# Patient Record
Sex: MALE | Race: OTHER | NOT HISPANIC OR LATINO | Employment: FULL TIME | URBAN - METROPOLITAN AREA
[De-identification: names, ages, dates, MRNs, and addresses within clinical notes are randomized per-mention and may not be internally consistent; named-entity substitution may affect disease eponyms.]

---

## 2023-01-27 PROBLEM — R60.9 EDEMA: Status: ACTIVE | Noted: 2023-01-27

## 2023-01-27 PROBLEM — R07.9 CHEST PAIN: Status: ACTIVE | Noted: 2023-01-27

## 2023-01-27 PROBLEM — M1A.9XX0 CHRONIC GOUT: Status: ACTIVE | Noted: 2023-01-27

## 2023-01-27 PROBLEM — E78.5 HYPERLIPIDEMIA: Status: ACTIVE | Noted: 2023-01-27

## 2023-01-27 PROBLEM — Z98.61 S/P PTCA (PERCUTANEOUS TRANSLUMINAL CORONARY ANGIOPLASTY): Status: ACTIVE | Noted: 2023-01-27

## 2023-01-27 PROBLEM — I10 BENIGN ESSENTIAL HYPERTENSION: Status: ACTIVE | Noted: 2023-01-27

## 2023-01-27 PROBLEM — G47.33 OBSTRUCTIVE SLEEP APNEA: Status: ACTIVE | Noted: 2023-01-27

## 2023-01-27 PROBLEM — R68.81 EARLY SATIETY: Status: ACTIVE | Noted: 2023-01-27

## 2023-01-27 PROBLEM — R63.4 ABNORMAL WEIGHT LOSS: Status: ACTIVE | Noted: 2023-01-27

## 2023-01-27 PROBLEM — I25.10 CAD IN NATIVE ARTERY: Status: ACTIVE | Noted: 2023-01-27

## 2023-01-27 PROBLEM — R06.02 SHORTNESS OF BREATH: Status: ACTIVE | Noted: 2023-01-27

## 2023-01-27 PROBLEM — I35.0 NONRHEUMATIC AORTIC VALVE STENOSIS: Status: ACTIVE | Noted: 2023-01-27

## 2023-01-27 PROBLEM — M75.82 TENDINITIS OF LEFT ROTATOR CUFF: Status: ACTIVE | Noted: 2023-01-27

## 2023-01-27 PROBLEM — R93.89 ABNORMAL CHEST CT: Status: ACTIVE | Noted: 2023-01-27

## 2023-01-27 PROBLEM — R42 DIZZINESS: Status: ACTIVE | Noted: 2023-01-27

## 2023-01-27 PROBLEM — K21.9 GERD (GASTROESOPHAGEAL REFLUX DISEASE): Status: ACTIVE | Noted: 2023-01-27

## 2023-01-27 PROBLEM — Z86.79 HISTORY OF ORTHOSTATIC HYPOTENSION: Status: ACTIVE | Noted: 2023-01-27

## 2023-01-27 PROBLEM — M75.52 BURSITIS OF LEFT SHOULDER: Status: ACTIVE | Noted: 2023-01-27

## 2023-01-27 PROBLEM — R01.1 MURMUR: Status: ACTIVE | Noted: 2023-01-27

## 2023-01-27 PROBLEM — Z98.84 STATUS POST BARIATRIC SURGERY: Status: ACTIVE | Noted: 2023-01-27

## 2023-01-27 PROBLEM — M75.42 IMPINGEMENT SYNDROME OF LEFT SHOULDER: Status: ACTIVE | Noted: 2023-01-27

## 2023-01-27 PROBLEM — R94.39 ABNORMAL STRESS TEST: Status: ACTIVE | Noted: 2023-01-27

## 2023-01-27 PROBLEM — E11.9 DIABETES MELLITUS (MULTI): Status: ACTIVE | Noted: 2023-01-27

## 2023-01-27 PROBLEM — F32.A DEPRESSION: Status: ACTIVE | Noted: 2023-01-27

## 2023-01-27 PROBLEM — N40.0 BPH (BENIGN PROSTATIC HYPERPLASIA): Status: ACTIVE | Noted: 2023-01-27

## 2023-01-27 PROBLEM — R00.2 PALPITATIONS: Status: ACTIVE | Noted: 2023-01-27

## 2023-01-27 PROBLEM — M25.512 LEFT SHOULDER PAIN: Status: ACTIVE | Noted: 2023-01-27

## 2023-01-27 RX ORDER — PREGABALIN 150 MG/1
1 CAPSULE ORAL EVERY MORNING
COMMUNITY
Start: 2022-10-12

## 2023-01-27 RX ORDER — NITROGLYCERIN 0.4 MG/1
0.4 TABLET SUBLINGUAL EVERY 5 MIN PRN
COMMUNITY
Start: 2018-06-06

## 2023-01-27 RX ORDER — MELATONIN 3 MG
CAPSULE ORAL
COMMUNITY
Start: 2022-10-12

## 2023-01-27 RX ORDER — OMEPRAZOLE 20 MG/1
1 CAPSULE, DELAYED RELEASE ORAL 2 TIMES DAILY
COMMUNITY
Start: 2022-10-12

## 2023-01-27 RX ORDER — AMLODIPINE BESYLATE 5 MG/1
1 TABLET ORAL DAILY
COMMUNITY
Start: 2022-10-12

## 2023-01-27 RX ORDER — METOPROLOL TARTRATE 25 MG/1
1 TABLET, FILM COATED ORAL 2 TIMES DAILY
COMMUNITY
Start: 2019-04-01

## 2023-01-27 RX ORDER — ASPIRIN 81 MG/1
81 TABLET ORAL
COMMUNITY

## 2023-01-27 RX ORDER — ALBUTEROL SULFATE 90 UG/1
1 AEROSOL, METERED RESPIRATORY (INHALATION) EVERY 4 HOURS PRN
COMMUNITY
Start: 2017-07-14

## 2023-01-27 RX ORDER — CHOLECALCIFEROL (VITAMIN D3) 50 MCG
1 TABLET ORAL DAILY
COMMUNITY
Start: 2022-10-12

## 2023-01-27 RX ORDER — DULOXETIN HYDROCHLORIDE 60 MG/1
60 CAPSULE, DELAYED RELEASE ORAL DAILY
COMMUNITY
Start: 2022-10-12

## 2023-01-27 RX ORDER — METFORMIN HYDROCHLORIDE 1000 MG/1
1 TABLET ORAL EVERY 12 HOURS
COMMUNITY
Start: 2016-07-14

## 2023-01-27 RX ORDER — NAPROXEN 375 MG/1
TABLET ORAL
COMMUNITY
Start: 2022-10-12 | End: 2023-10-27

## 2023-01-27 RX ORDER — BUPROPION HYDROCHLORIDE 150 MG/1
150 TABLET, EXTENDED RELEASE ORAL DAILY
COMMUNITY
Start: 2022-10-12

## 2023-01-27 RX ORDER — ATORVASTATIN CALCIUM 40 MG/1
40 TABLET, FILM COATED ORAL DAILY
COMMUNITY
Start: 2017-07-06

## 2023-01-27 RX ORDER — FERROUS SULFATE 325(65) MG
65 TABLET ORAL 3 TIMES DAILY
COMMUNITY
Start: 2022-10-12

## 2023-01-27 RX ORDER — TAMSULOSIN HYDROCHLORIDE 0.4 MG/1
1 CAPSULE ORAL DAILY
COMMUNITY
Start: 2016-07-14

## 2023-01-27 RX ORDER — ACETAMINOPHEN 325 MG/1
325 TABLET ORAL EVERY 6 HOURS PRN
COMMUNITY
Start: 2017-07-14

## 2023-01-27 RX ORDER — FLUTICASONE PROPIONATE 50 MCG
2 SPRAY, SUSPENSION (ML) NASAL NIGHTLY
COMMUNITY
Start: 2022-10-12

## 2023-01-27 RX ORDER — ACETAMINOPHEN, ASPIRIN (NSAID), AND CAFFEINE 250; 250; 65 MG/1; MG/1; MG/1
1 TABLET, FILM COATED ORAL 3 TIMES DAILY PRN
COMMUNITY
Start: 2017-07-14 | End: 2023-10-28 | Stop reason: ENTERED-IN-ERROR

## 2023-01-27 RX ORDER — ALLOPURINOL 300 MG/1
300 TABLET ORAL DAILY
COMMUNITY
Start: 2019-04-01

## 2023-01-27 RX ORDER — EZETIMIBE 10 MG/1
1 TABLET ORAL DAILY
COMMUNITY
Start: 2021-01-21

## 2023-01-27 RX ORDER — LISINOPRIL 20 MG/1
20 TABLET ORAL DAILY
COMMUNITY
Start: 2021-01-21

## 2023-01-27 RX ORDER — INSULIN GLARGINE 100 [IU]/ML
26 INJECTION, SOLUTION SUBCUTANEOUS 2 TIMES DAILY PRN
COMMUNITY

## 2023-06-06 ENCOUNTER — HOSPITAL ENCOUNTER (OUTPATIENT)
Dept: DATA CONVERSION | Facility: HOSPITAL | Age: 63
End: 2023-06-06
Attending: STUDENT IN AN ORGANIZED HEALTH CARE EDUCATION/TRAINING PROGRAM | Admitting: STUDENT IN AN ORGANIZED HEALTH CARE EDUCATION/TRAINING PROGRAM
Payer: COMMERCIAL

## 2023-06-06 DIAGNOSIS — Z95.5 PRESENCE OF CORONARY ANGIOPLASTY IMPLANT AND GRAFT: ICD-10-CM

## 2023-06-06 DIAGNOSIS — E78.5 HYPERLIPIDEMIA, UNSPECIFIED: ICD-10-CM

## 2023-06-06 DIAGNOSIS — G47.33 OBSTRUCTIVE SLEEP APNEA (ADULT) (PEDIATRIC): ICD-10-CM

## 2023-06-06 DIAGNOSIS — I25.10 ATHEROSCLEROTIC HEART DISEASE OF NATIVE CORONARY ARTERY WITHOUT ANGINA PECTORIS: ICD-10-CM

## 2023-06-06 DIAGNOSIS — S02.66XD: ICD-10-CM

## 2023-06-06 DIAGNOSIS — Z79.02 LONG TERM (CURRENT) USE OF ANTITHROMBOTICS/ANTIPLATELETS: ICD-10-CM

## 2023-06-06 DIAGNOSIS — E11.9 TYPE 2 DIABETES MELLITUS WITHOUT COMPLICATIONS (MULTI): ICD-10-CM

## 2023-06-06 DIAGNOSIS — I10 ESSENTIAL (PRIMARY) HYPERTENSION: ICD-10-CM

## 2023-06-06 DIAGNOSIS — S02.69XA: ICD-10-CM

## 2023-06-06 DIAGNOSIS — N40.0 BENIGN PROSTATIC HYPERPLASIA WITHOUT LOWER URINARY TRACT SYMPTOMS: ICD-10-CM

## 2023-06-06 DIAGNOSIS — Z87.891 PERSONAL HISTORY OF NICOTINE DEPENDENCE: ICD-10-CM

## 2023-06-06 DIAGNOSIS — Z79.82 LONG TERM (CURRENT) USE OF ASPIRIN: ICD-10-CM

## 2023-06-06 DIAGNOSIS — Z98.84 BARIATRIC SURGERY STATUS: ICD-10-CM

## 2023-06-06 LAB
POCT GLUCOSE: 108 MG/DL (ref 74–99)
POCT GLUCOSE: 120 MG/DL (ref 74–99)

## 2023-09-07 VITALS — HEIGHT: 70 IN | WEIGHT: 154.54 LBS | BODY MASS INDEX: 22.12 KG/M2

## 2023-09-18 ENCOUNTER — HOSPITAL ENCOUNTER (OUTPATIENT)
Dept: DATA CONVERSION | Facility: HOSPITAL | Age: 63
End: 2023-09-18
Attending: INTERNAL MEDICINE | Admitting: INTERNAL MEDICINE
Payer: COMMERCIAL

## 2023-09-18 DIAGNOSIS — I25.10 ATHEROSCLEROTIC HEART DISEASE OF NATIVE CORONARY ARTERY WITHOUT ANGINA PECTORIS: ICD-10-CM

## 2023-09-18 DIAGNOSIS — E11.9 TYPE 2 DIABETES MELLITUS WITHOUT COMPLICATIONS (MULTI): ICD-10-CM

## 2023-09-18 DIAGNOSIS — E78.5 HYPERLIPIDEMIA, UNSPECIFIED: ICD-10-CM

## 2023-09-18 DIAGNOSIS — Z79.01 LONG TERM (CURRENT) USE OF ANTICOAGULANTS: ICD-10-CM

## 2023-09-18 DIAGNOSIS — I10 ESSENTIAL (PRIMARY) HYPERTENSION: ICD-10-CM

## 2023-09-18 DIAGNOSIS — Z98.84 BARIATRIC SURGERY STATUS: ICD-10-CM

## 2023-09-18 DIAGNOSIS — R63.4 ABNORMAL WEIGHT LOSS: ICD-10-CM

## 2023-09-18 DIAGNOSIS — I35.0 NONRHEUMATIC AORTIC (VALVE) STENOSIS: ICD-10-CM

## 2023-09-18 DIAGNOSIS — G47.33 OBSTRUCTIVE SLEEP APNEA (ADULT) (PEDIATRIC): ICD-10-CM

## 2023-09-18 LAB — POCT GLUCOSE: 120 MG/DL (ref 74–99)

## 2023-09-21 LAB
COMPLETE PATHOLOGY REPORT: NORMAL
CONVERTED CLINICAL DIAGNOSIS-HISTORY: NORMAL
CONVERTED FINAL DIAGNOSIS: NORMAL
CONVERTED FINAL REPORT PDF LINK TO COPY AND PASTE: NORMAL
CONVERTED GROSS DESCRIPTION: NORMAL

## 2023-09-29 VITALS — HEIGHT: 70 IN | BODY MASS INDEX: 22.09 KG/M2 | WEIGHT: 154.32 LBS

## 2023-10-02 NOTE — OP NOTE
PROCEDURE DETAILS    Preoperative Diagnosis:  Mandible fracture  Postoperative Diagnosis:  Mandible fracture  Surgeon: Dr. Sage  Resident/Fellow/Other Assistant: Katelyn Raygoza    Procedure:  1. Removal of arch bars  2. Dental extraction of #24 and #25 with alveoplasty  Estimated Blood Loss: <5mL  Findings: Mandibular and maxillary arch bars removed with notable gingival overgrowth. Extraction of #24 and #25.   Specimens(s) Collected: no,           Operative Report:   Indications:   The patient is a 63 year old male with history of mandible fracture s/p MMF who presents for arch bar removal and dental extractions.  The decision was made to proceed to the OR for the above listed procedure after reviewing the risks/benefits/alternatives  with the patient. Written informed consent was obtained and placed in the chart.    Operative Details:  The patient was met in the preoperative area and at that time any further questions were answered and consent was reviewed. The patient was escorted to the operating suite, transferred to the operating table in a supine position. A preoperative timeout  was performed by Dr. Sage. The patient was placed under MAC sedation.     Using a blue lip retractor, both mandibular and maxillary arch bars were removed. Next, dental extraction of #24 and #25 was performed using dental elevator and rongeur. For hemostasis, gel foam was applied to the extraction sites and alveoplasty was  performed using 3-0 vicryl. The mouth was copiously irrigated and was found to be hemostatic.     All instruments were removed. The patient was turned over to anesthesia and extubated, having tolerated the procedure well. The patient was then  escorted to the post anesthesia care unit in stable condition. There were no complications. Dr. Sage was present for all key portions of the procedure.                         Attestation:   Note Completion:  Attending Attestation I  was present for key portions of the procedure and the procedure lasted longer than 5 minutes.    I am a: Resident/Fellow         Electronic Signatures:  Neelam Sage)  (Signed 06-Jun-2023 18:35)   Authored: Post-Operative Note, Chart Review, Note Completion   Co-Signer: Post-Operative Note, Chart Review, Note Completion  Ching Raygoza (Resident))  (Signed 06-Jun-2023 13:52)   Authored: Post-Operative Note, Chart Review, Note Completion      Last Updated: 06-Jun-2023 18:35 by Neelam Sage)

## 2023-10-17 ENCOUNTER — HOSPITAL ENCOUNTER (OUTPATIENT)
Dept: VASCULAR MEDICINE | Facility: HOSPITAL | Age: 63
Discharge: HOME | End: 2023-10-17
Payer: COMMERCIAL

## 2023-10-17 DIAGNOSIS — I73.9 PERIPHERAL VASCULAR DISEASE, UNSPECIFIED (CMS-HCC): ICD-10-CM

## 2023-10-17 PROCEDURE — 93922 UPR/L XTREMITY ART 2 LEVELS: CPT | Performed by: INTERNAL MEDICINE

## 2023-10-17 PROCEDURE — 93922 UPR/L XTREMITY ART 2 LEVELS: CPT

## 2023-10-18 ENCOUNTER — APPOINTMENT (OUTPATIENT)
Dept: RADIOLOGY | Facility: HOSPITAL | Age: 63
End: 2023-10-18
Payer: COMMERCIAL

## 2023-10-18 ENCOUNTER — HOSPITAL ENCOUNTER (EMERGENCY)
Facility: HOSPITAL | Age: 63
Discharge: HOME | End: 2023-10-19
Payer: COMMERCIAL

## 2023-10-18 DIAGNOSIS — M54.41 CHRONIC RIGHT-SIDED LOW BACK PAIN WITH RIGHT-SIDED SCIATICA: Primary | ICD-10-CM

## 2023-10-18 DIAGNOSIS — G89.29 CHRONIC RIGHT-SIDED LOW BACK PAIN WITH RIGHT-SIDED SCIATICA: Primary | ICD-10-CM

## 2023-10-18 PROCEDURE — 2500000005 HC RX 250 GENERAL PHARMACY W/O HCPCS: Performed by: NURSE PRACTITIONER

## 2023-10-18 PROCEDURE — 93971 EXTREMITY STUDY: CPT

## 2023-10-18 PROCEDURE — 72131 CT LUMBAR SPINE W/O DYE: CPT

## 2023-10-18 PROCEDURE — 96372 THER/PROPH/DIAG INJ SC/IM: CPT | Performed by: NURSE PRACTITIONER

## 2023-10-18 PROCEDURE — 73700 CT LOWER EXTREMITY W/O DYE: CPT | Mod: RIGHT SIDE | Performed by: STUDENT IN AN ORGANIZED HEALTH CARE EDUCATION/TRAINING PROGRAM

## 2023-10-18 PROCEDURE — 76376 3D RENDER W/INTRP POSTPROCES: CPT | Mod: RIGHT SIDE | Performed by: STUDENT IN AN ORGANIZED HEALTH CARE EDUCATION/TRAINING PROGRAM

## 2023-10-18 PROCEDURE — 99285 EMERGENCY DEPT VISIT HI MDM: CPT

## 2023-10-18 PROCEDURE — 72128 CT CHEST SPINE W/O DYE: CPT | Performed by: STUDENT IN AN ORGANIZED HEALTH CARE EDUCATION/TRAINING PROGRAM

## 2023-10-18 PROCEDURE — 2500000001 HC RX 250 WO HCPCS SELF ADMINISTERED DRUGS (ALT 637 FOR MEDICARE OP): Performed by: NURSE PRACTITIONER

## 2023-10-18 PROCEDURE — 73700 CT LOWER EXTREMITY W/O DYE: CPT | Mod: RT

## 2023-10-18 PROCEDURE — 93971 EXTREMITY STUDY: CPT | Performed by: RADIOLOGY

## 2023-10-18 PROCEDURE — 2500000004 HC RX 250 GENERAL PHARMACY W/ HCPCS (ALT 636 FOR OP/ED): Performed by: NURSE PRACTITIONER

## 2023-10-18 PROCEDURE — 2500000001 HC RX 250 WO HCPCS SELF ADMINISTERED DRUGS (ALT 637 FOR MEDICARE OP)

## 2023-10-18 RX ORDER — MORPHINE SULFATE 4 MG/ML
4 INJECTION, SOLUTION INTRAMUSCULAR; INTRAVENOUS ONCE
Status: COMPLETED | OUTPATIENT
Start: 2023-10-18 | End: 2023-10-18

## 2023-10-18 RX ORDER — LIDOCAINE 560 MG/1
1 PATCH PERCUTANEOUS; TOPICAL; TRANSDERMAL DAILY
Status: DISCONTINUED | OUTPATIENT
Start: 2023-10-18 | End: 2023-10-19 | Stop reason: HOSPADM

## 2023-10-18 RX ORDER — PREGABALIN 25 MG/1
75 CAPSULE ORAL DAILY
Status: DISCONTINUED | OUTPATIENT
Start: 2023-10-18 | End: 2023-10-19 | Stop reason: HOSPADM

## 2023-10-18 RX ORDER — OXYCODONE AND ACETAMINOPHEN 5; 325 MG/1; MG/1
1 TABLET ORAL ONCE
Status: COMPLETED | OUTPATIENT
Start: 2023-10-18 | End: 2023-10-18

## 2023-10-18 RX ORDER — ORPHENADRINE CITRATE 30 MG/ML
30 INJECTION INTRAMUSCULAR; INTRAVENOUS ONCE
Status: COMPLETED | OUTPATIENT
Start: 2023-10-18 | End: 2023-10-18

## 2023-10-18 RX ADMIN — MORPHINE SULFATE 4 MG: 4 INJECTION, SOLUTION INTRAMUSCULAR; INTRAVENOUS at 19:37

## 2023-10-18 RX ADMIN — PREGABALIN 75 MG: 25 CAPSULE ORAL at 23:50

## 2023-10-18 RX ADMIN — ORPHENADRINE CITRATE 30 MG: 60 INJECTION INTRAMUSCULAR; INTRAVENOUS at 19:36

## 2023-10-18 RX ADMIN — LIDOCAINE 1 PATCH: 4 PATCH TOPICAL at 19:36

## 2023-10-18 RX ADMIN — OXYCODONE HYDROCHLORIDE AND ACETAMINOPHEN 1 TABLET: 5; 325 TABLET ORAL at 21:49

## 2023-10-18 ASSESSMENT — COLUMBIA-SUICIDE SEVERITY RATING SCALE - C-SSRS
2. HAVE YOU ACTUALLY HAD ANY THOUGHTS OF KILLING YOURSELF?: NO
1. IN THE PAST MONTH, HAVE YOU WISHED YOU WERE DEAD OR WISHED YOU COULD GO TO SLEEP AND NOT WAKE UP?: NO
6. HAVE YOU EVER DONE ANYTHING, STARTED TO DO ANYTHING, OR PREPARED TO DO ANYTHING TO END YOUR LIFE?: NO

## 2023-10-18 ASSESSMENT — PAIN DESCRIPTION - PAIN TYPE: TYPE: ACUTE PAIN

## 2023-10-18 ASSESSMENT — PAIN SCALES - GENERAL
PAINLEVEL_OUTOF10: 10 - WORST POSSIBLE PAIN
PAINLEVEL_OUTOF10: 10 - WORST POSSIBLE PAIN

## 2023-10-18 ASSESSMENT — PAIN - FUNCTIONAL ASSESSMENT: PAIN_FUNCTIONAL_ASSESSMENT: 0-10

## 2023-10-18 NOTE — ED PROVIDER NOTES
HPI   Chief Complaint   Patient presents with    Back Pain       This is a 63-year-old  male presenting to the emergency room with complaints of right hip and leg pain for the past month.  The patient denied any specific mechanism of injury.  [facility] providers in the emergency room.  X-rays of the hip and back were performed and were unremarkable.  The patient was placed on naproxen and a muscle relaxer.  He was then placed on steroids and meloxicam.  He has not had any significant relief of his pain symptoms.  The pain originates in the hip and radiates down the leg.  He reports that he has tingling in his right foot and he feels like he has a charley horse in the right calf.  The patient does have a history of DVTs.  He is on Brilinta.  The patient denies any chest pain, shortness of breath, dizziness, or palpitations.  He denies any difficulties controlling his urine or bowel function.  He has had a significant weight loss which his doctors are aware.  The patient has not had any spinal injections.  He denies any saddle anesthesia.                          No data recorded                Patient History   Past Medical History:   Diagnosis Date    Diabetes mellitus (CMS/[facility])     Personal history of other diseases of the circulatory system 10/16/2020    History of heart disease    Personal history of other diseases of the circulatory system 10/16/2020    History of hypertension     Past Surgical History:   Procedure Laterality Date    BACK SURGERY  07/14/2017    Back Surgery    CARPAL TUNNEL RELEASE  07/14/2017    Neuroplasty Median Nerve At Carpal Tunnel    CORONARY ANGIOPLASTY  07/14/2017    PTCA    GASTRIC BYPASS  07/14/2017    Gastric Surgery For Morbid Obesity Gastric Bypass     Family History   Problem Relation Name Age of Onset    Coronary artery disease Mother      Coronary artery disease Father      Coronary artery disease Brother       Social History      Tobacco Use    Smoking status: Not on file    Smokeless tobacco: Not on file   Substance Use Topics    Alcohol use: Not on file    Drug use: Not on file       Physical Exam   ED Triage Vitals [10/18/23 1713]   Temp Heart Rate Resp BP   36.9 °C (98.5 °F) 81 16 118/76      SpO2 Temp src Heart Rate Source Patient Position   99 % -- Monitor --      BP Location FiO2 (%)     -- --       Physical Exam  Vitals and nursing note reviewed.   Constitutional:       Appearance: Normal appearance. He is normal weight.   HENT:      Head: Normocephalic and atraumatic.      Right Ear: Tympanic membrane normal.      Left Ear: Tympanic membrane normal.      Nose: Nose normal.      Mouth/Throat:      Mouth: Mucous membranes are moist.      Pharynx: Oropharynx is clear.   Eyes:      Extraocular Movements: Extraocular movements intact.      Conjunctiva/sclera: Conjunctivae normal.      Pupils: Pupils are equal, round, and reactive to light.   Cardiovascular:      Rate and Rhythm: Normal rate and regular rhythm.      Pulses: Normal pulses.   Pulmonary:      Effort: Pulmonary effort is normal.      Breath sounds: Normal breath sounds.   Abdominal:      General: Abdomen is flat. Bowel sounds are normal.      Palpations: Abdomen is soft.   Genitourinary:     Comments: No CVA tenderness or pubic pain.  Musculoskeletal:      Comments: The patient does not have any midline spinal tenderness.  The patient has no obvious deformity noted to the right leg.  There is no shortening or rotation of the leg.  He has no reproducible pain with palpation.  The patient has a positive straight leg test.Distal extremities with brisk cap refill.  The patient has intact sensation to light touch.   Skin:     General: Skin is warm and dry.      Capillary Refill: Capillary refill takes less than 2 seconds.   Neurological:      General: No focal deficit present.      Mental Status: He is alert and oriented to person, place, and time.   Psychiatric:         Mood  and Affect: Mood normal.         Judgment: Judgment normal.         ED Course & MDM   Diagnoses as of 10/25/23 1247   Chronic right-sided low back pain with right-sided sciatica       Medical Decision Making  The patient was seen and evaluated by the nurse practitioner, Nazia Montemayor.  The patient did not have any traumatic injury.  He is not displaying any signs of cauda equina syndrome or acute neurological deficit.  A CT of the right hip without contrast showed mild right hip arthrosis with no acute fracture or dislocation.  A CT of the lumbar spine showed some discogenic degenerative changes of the lumbar spine at L5-S1 level with significant loss of disc space height with ex vacuo disc phenomena.  There was no evidence of a spinal abscess/mass.  Patient is not having any fever or cold-like symptoms.  The patient was medicated with morphine 4 mg IM and Norflex 30 mg IM.  A lidocaine patch was placed on the area of pain.  Patient reported still significant amount of pain.  He was medicated with 1 Percocet tab p.o.  An ultrasound of the right lower extremity is ordered to assess for possible DVT. Care of the patient was endorsed to the oncoming provider, Armando Mao to await the results of the lower extremity ultrasound and pain management.        Procedure  Procedures     Nazia Montemayor, APRN-CNP  10/25/23 9177

## 2023-10-19 VITALS
BODY MASS INDEX: 22.9 KG/M2 | WEIGHT: 160 LBS | RESPIRATION RATE: 16 BRPM | OXYGEN SATURATION: 99 % | HEIGHT: 70 IN | TEMPERATURE: 98.5 F | DIASTOLIC BLOOD PRESSURE: 74 MMHG | SYSTOLIC BLOOD PRESSURE: 123 MMHG | HEART RATE: 78 BPM

## 2023-10-19 RX ORDER — OXYCODONE AND ACETAMINOPHEN 5; 325 MG/1; MG/1
1-2 TABLET ORAL EVERY 6 HOURS PRN
Qty: 15 TABLET | Refills: 0 | Status: SHIPPED | OUTPATIENT
Start: 2023-10-19 | End: 2023-12-18 | Stop reason: WASHOUT

## 2023-10-19 RX ORDER — METHOCARBAMOL 500 MG/1
500 TABLET, FILM COATED ORAL 2 TIMES DAILY
Qty: 20 TABLET | Refills: 0 | Status: SHIPPED | OUTPATIENT
Start: 2023-10-19 | End: 2023-12-18

## 2023-10-19 NOTE — PROGRESS NOTES
Emergency Medicine Transition of Care Note.    I received Manish Lance in signout from Dr. Nazia Montemayor.  Please see the previous ED provider note for all HPI, PE and MDM up to the time of signout at 2220. This is in addition to the primary record.    In brief Manish Lance is an 63 y.o. male presenting for   Chief Complaint   Patient presents with    Back Pain     At the time of signout we were awaiting:  Ultrasound of right lower extremity  Diagnoses as of 10/19/23 0105   Chronic right-sided low back pain with right-sided sciatica       Medical Decision Making    This patient was seen in the emergency department with an attending physician available at all times throughout their ED course    Please note that this patient was handed off by the previous provider, and that for a full assessment and comprehensive account of the patient's ED course please see the previous providers note as stated in the handoff section of this note.    The patient's ultrasound of the right lower extremity is negative, with the signout information, it appears that the patient has chronic low back pain with right-sided sciatica causing the patient is pain.  Steroids were considered for the patient, however he is a diabetic and states that steroids have not been effective for his pain, therefore they are not prescribed here today in the emergency department.  With the medications given thus far, the patient states that his pain is at a tolerable level, the patient is resting in a chair.    For at home management, patient will be given a prescription for Robaxin, continue to take his naproxen, and a short regimen of Percocet for his breakthrough pain.  Patient will follow-up with referred orthopedic spine for definitive care    Patient is amenable to the plan of discharge as outlined above, all patient's questions pertaining to their ED course were answered in their entirety.  Strict return precautions were discussed with the  patient and they verbalized understanding.  Further, it was made clear to the patient that from an emergent basis, all effort and testing was done to eliminate any imminent dangerous or potentially dangerous conditions of the patient however if their symptoms get much worse or feel life-threatening, they are to return to the emergency department or call 911 immediately.  Final diagnoses:   [M54.41, G89.29] Chronic right-sided low back pain with right-sided sciatica           Procedure  Procedures    Armando Mao, APRN-CNP

## 2023-10-24 ENCOUNTER — OFFICE VISIT (OUTPATIENT)
Dept: ORTHOPEDIC SURGERY | Facility: CLINIC | Age: 63
End: 2023-10-24
Payer: COMMERCIAL

## 2023-10-24 DIAGNOSIS — M25.551 RIGHT HIP PAIN: Primary | ICD-10-CM

## 2023-10-24 PROCEDURE — 4010F ACE/ARB THERAPY RXD/TAKEN: CPT | Performed by: ORTHOPAEDIC SURGERY

## 2023-10-24 PROCEDURE — 3052F HG A1C>EQUAL 8.0%<EQUAL 9.0%: CPT | Performed by: ORTHOPAEDIC SURGERY

## 2023-10-24 PROCEDURE — 99204 OFFICE O/P NEW MOD 45 MIN: CPT | Performed by: ORTHOPAEDIC SURGERY

## 2023-10-24 RX ORDER — METHYLPREDNISOLONE 4 MG/1
4 TABLET ORAL ONCE
Qty: 21 TABLET | Refills: 0 | OUTPATIENT
Start: 2023-10-24 | End: 2023-10-28

## 2023-10-24 NOTE — LETTER
October 24, 2023     Pamela Koudelka, APRN-CNP  [address]  [address] [address]    Patient: Manish Lance   YOB: 1960   Date of Visit: 10/24/2023       Dear NINA Phipps:    Thank you for referring Manish Lance to me for evaluation. Below are my notes for this consultation.  If you have questions, please do not hesitate to call me. I look forward to following your patient along with you.       Sincerely,     Angel Reinoso MD      CC: No Recipients  ______________________________________________________________________________________    This is a consultation from NINA Phipps for right leg pain    This is a 63 y.o. male who presents for follow-up of right leg pain.  Patient states is going on for about 45 weeks this is new issue.  Planes of sharp shooting pain across his right leg all the way to the foot.  He has some numbness in his toes.  No previous surgeries or injections in his lumbar spine or hip.  No changes in his bowel or bladder function.  He is try taking anti-inflammatories but not really helping.  He took some pain medication from the ER which is helped somewhat.    Physical Exam    There has been no interval change in this patient's past medical, surgical, medications, allergies, family history or social history since the most recent visit to a provider within our department. 14 point review of systems was performed, reviewed, and negative except for pertinent positives documented in the history of present illness.     Constitutional: well developed, well nourished male in no acute distress  Psychiatric: normal mood, appropriate affect  Eyes: sclera anicteric  HENT: normocephalic/atraumatic  CV: regular rate and rhythm   Respiratory: non labored breathing  Integumentary: no rash  Neurological: moves all extremities    Right hip exam: skin normal, no abrasions, wounds, or lacerations. nttp over greater trochanter.  "negative log roll, negative tabatha's test. flexion to 90 degrees without pain. no pain with flexion abduction and external rotation, no pain with flexion adduction and internal rotation. neurovascularly intact distally    Lower extremity spine exam: 5 out of 5 strength with hip flexion extension, knee flexion and extension, ankle and great toe dorsiflexion and plantarflexion. Normal sensation throughout all dermatomes distally. Downgoing Babinski no abnormal reflexes.  Positive straight leg raise    CT of the right hip and lumbar spine were reviewed and independently interpreted by me today, they show mild degenerative changes in the right hip joint.  Multilevel degenerative changes in lumbar spine    Procedures      Impression/Plan: This is a 63 y.o. male with right leg pain consistent with lumbar radiculopathy.  I discussed this with him in detail including the clinical radiographic findings treatment options risks and benefits.  No red flag symptoms today, strict emergency room warnings were given.  I recommended initial management with oral steroid taper, physical therapy, referral information given for pain management.  I will see him back as needed    BMI Readings from Last 1 Encounters:   10/18/23 22.96 kg/m²      Lab Results   Component Value Date    CREATININE 1.49 (H) 05/07/2023     Tobacco Use: Not on file      MELD 3.0: 13 at 5/7/2023  4:38 PM  MELD-Na: 10 at 5/7/2023  4:38 PM  Calculated from:  Serum Creatinine: 1.49 mg/dL at 5/7/2023  4:38 PM  Serum Sodium: 134 mmol/L at 5/7/2023  4:38 PM  Total Bilirubin: 0.6 mg/dL (Using min of 1 mg/dL) at 5/7/2023  4:38 PM  Serum Albumin: 4.0 g/dL (Using max of 3.5 g/dL) at 5/7/2023  4:38 PM  INR(ratio): 1.0 at 5/7/2023  4:38 PM  Age at listing (hypothetical): 62 years  Sex: Male at 5/7/2023  4:38 PM       Lab Results   Component Value Date    HGBA1C 8.4 (A) 04/27/2023     No results found for: \"STAPHMRSASCR\"  "

## 2023-10-24 NOTE — PROGRESS NOTES
This is a consultation from NINA Phipps for right leg pain    This is a 63 y.o. male who presents for follow-up of right leg pain.  Patient states is going on for about 45 weeks this is new issue.  Planes of sharp shooting pain across his right leg all the way to the foot.  He has some numbness in his toes.  No previous surgeries or injections in his lumbar spine or hip.  No changes in his bowel or bladder function.  He is try taking anti-inflammatories but not really helping.  He took some pain medication from the ER which is helped somewhat.    Physical Exam    There has been no interval change in this patient's past medical, surgical, medications, allergies, family history or social history since the most recent visit to a provider within our department. 14 point review of systems was performed, reviewed, and negative except for pertinent positives documented in the history of present illness.     Constitutional: well developed, well nourished male in no acute distress  Psychiatric: normal mood, appropriate affect  Eyes: sclera anicteric  HENT: normocephalic/atraumatic  CV: regular rate and rhythm   Respiratory: non labored breathing  Integumentary: no rash  Neurological: moves all extremities    Right hip exam: skin normal, no abrasions, wounds, or lacerations. nttp over greater trochanter. negative log roll, negative tabatha's test. flexion to 90 degrees without pain. no pain with flexion abduction and external rotation, no pain with flexion adduction and internal rotation. neurovascularly intact distally    Lower extremity spine exam: 5 out of 5 strength with hip flexion extension, knee flexion and extension, ankle and great toe dorsiflexion and plantarflexion. Normal sensation throughout all dermatomes distally. Downgoing Babinski no abnormal reflexes.  Positive straight leg raise    CT of the right hip and lumbar spine were reviewed and independently interpreted by me today, they show mild  "degenerative changes in the right hip joint.  Multilevel degenerative changes in lumbar spine    Procedures      Impression/Plan: This is a 63 y.o. male with right leg pain consistent with lumbar radiculopathy.  I discussed this with him in detail including the clinical radiographic findings treatment options risks and benefits.  No red flag symptoms today, strict emergency room warnings were given.  I recommended initial management with oral steroid taper, physical therapy, referral information given for pain management.  I will see him back as needed    BMI Readings from Last 1 Encounters:   10/18/23 22.96 kg/m²      Lab Results   Component Value Date    CREATININE 1.49 (H) 05/07/2023     Tobacco Use: Not on file      MELD 3.0: 13 at 5/7/2023  4:38 PM  MELD-Na: 10 at 5/7/2023  4:38 PM  Calculated from:  Serum Creatinine: 1.49 mg/dL at 5/7/2023  4:38 PM  Serum Sodium: 134 mmol/L at 5/7/2023  4:38 PM  Total Bilirubin: 0.6 mg/dL (Using min of 1 mg/dL) at 5/7/2023  4:38 PM  Serum Albumin: 4.0 g/dL (Using max of 3.5 g/dL) at 5/7/2023  4:38 PM  INR(ratio): 1.0 at 5/7/2023  4:38 PM  Age at listing (hypothetical): 62 years  Sex: Male at 5/7/2023  4:38 PM       Lab Results   Component Value Date    HGBA1C 8.4 (A) 04/27/2023     No results found for: \"STAPHMRSASCR\"  "

## 2023-10-27 ENCOUNTER — OFFICE VISIT (OUTPATIENT)
Dept: ORTHOPEDIC SURGERY | Facility: CLINIC | Age: 63
End: 2023-10-27
Payer: COMMERCIAL

## 2023-10-27 ENCOUNTER — HOSPITAL ENCOUNTER (EMERGENCY)
Facility: HOSPITAL | Age: 63
Discharge: HOME | End: 2023-10-28
Attending: STUDENT IN AN ORGANIZED HEALTH CARE EDUCATION/TRAINING PROGRAM
Payer: COMMERCIAL

## 2023-10-27 DIAGNOSIS — N40.0 BENIGN PROSTATIC HYPERPLASIA WITHOUT LOWER URINARY TRACT SYMPTOMS: Chronic | ICD-10-CM

## 2023-10-27 DIAGNOSIS — R55 SYNCOPE, UNSPECIFIED SYNCOPE TYPE: Primary | ICD-10-CM

## 2023-10-27 DIAGNOSIS — M54.16 LUMBAR RADICULOPATHY: Primary | ICD-10-CM

## 2023-10-27 LAB
APPEARANCE UR: CLEAR
BASOPHILS # BLD AUTO: 0.05 X10*3/UL (ref 0–0.1)
BASOPHILS NFR BLD AUTO: 0.8 %
BILIRUB UR STRIP.AUTO-MCNC: NEGATIVE MG/DL
COLOR UR: YELLOW
EOSINOPHIL # BLD AUTO: 0.53 X10*3/UL (ref 0–0.7)
EOSINOPHIL NFR BLD AUTO: 9 %
ERYTHROCYTE [DISTWIDTH] IN BLOOD BY AUTOMATED COUNT: 14.8 % (ref 11.5–14.5)
GLUCOSE BLD MANUAL STRIP-MCNC: 307 MG/DL (ref 74–99)
GLUCOSE UR STRIP.AUTO-MCNC: ABNORMAL MG/DL
HCT VFR BLD AUTO: 36.9 % (ref 41–52)
HGB BLD-MCNC: 12.6 G/DL (ref 13.5–17.5)
IMM GRANULOCYTES # BLD AUTO: 0.02 X10*3/UL (ref 0–0.7)
IMM GRANULOCYTES NFR BLD AUTO: 0.3 % (ref 0–0.9)
KETONES UR STRIP.AUTO-MCNC: NEGATIVE MG/DL
LEUKOCYTE ESTERASE UR QL STRIP.AUTO: NEGATIVE
LYMPHOCYTES # BLD AUTO: 1.59 X10*3/UL (ref 1.2–4.8)
LYMPHOCYTES NFR BLD AUTO: 27 %
MCH RBC QN AUTO: 30.4 PG (ref 26–34)
MCHC RBC AUTO-ENTMCNC: 34.1 G/DL (ref 32–36)
MCV RBC AUTO: 89 FL (ref 80–100)
MONOCYTES # BLD AUTO: 0.54 X10*3/UL (ref 0.1–1)
MONOCYTES NFR BLD AUTO: 9.2 %
NEUTROPHILS # BLD AUTO: 3.16 X10*3/UL (ref 1.2–7.7)
NEUTROPHILS NFR BLD AUTO: 53.7 %
NITRITE UR QL STRIP.AUTO: NEGATIVE
NRBC BLD-RTO: 0 /100 WBCS (ref 0–0)
PH UR STRIP.AUTO: 5 [PH]
PLATELET # BLD AUTO: 132 X10*3/UL (ref 150–450)
PMV BLD AUTO: 9.6 FL (ref 7.5–11.5)
PROT UR STRIP.AUTO-MCNC: NEGATIVE MG/DL
RBC # BLD AUTO: 4.15 X10*6/UL (ref 4.5–5.9)
RBC # UR STRIP.AUTO: NEGATIVE /UL
SP GR UR STRIP.AUTO: 1.03
UROBILINOGEN UR STRIP.AUTO-MCNC: 2 MG/DL
WBC # BLD AUTO: 5.9 X10*3/UL (ref 4.4–11.3)

## 2023-10-27 PROCEDURE — 82947 ASSAY GLUCOSE BLOOD QUANT: CPT | Mod: 59

## 2023-10-27 PROCEDURE — 99204 OFFICE O/P NEW MOD 45 MIN: CPT | Performed by: ORTHOPAEDIC SURGERY

## 2023-10-27 PROCEDURE — 81003 URINALYSIS AUTO W/O SCOPE: CPT | Performed by: STUDENT IN AN ORGANIZED HEALTH CARE EDUCATION/TRAINING PROGRAM

## 2023-10-27 PROCEDURE — 87636 SARSCOV2 & INF A&B AMP PRB: CPT | Performed by: STUDENT IN AN ORGANIZED HEALTH CARE EDUCATION/TRAINING PROGRAM

## 2023-10-27 PROCEDURE — 82010 KETONE BODYS QUAN: CPT | Performed by: STUDENT IN AN ORGANIZED HEALTH CARE EDUCATION/TRAINING PROGRAM

## 2023-10-27 PROCEDURE — 99285 EMERGENCY DEPT VISIT HI MDM: CPT | Mod: 25

## 2023-10-27 PROCEDURE — 96372 THER/PROPH/DIAG INJ SC/IM: CPT

## 2023-10-27 PROCEDURE — 80053 COMPREHEN METABOLIC PANEL: CPT | Performed by: STUDENT IN AN ORGANIZED HEALTH CARE EDUCATION/TRAINING PROGRAM

## 2023-10-27 PROCEDURE — 4010F ACE/ARB THERAPY RXD/TAKEN: CPT | Performed by: ORTHOPAEDIC SURGERY

## 2023-10-27 PROCEDURE — 83880 ASSAY OF NATRIURETIC PEPTIDE: CPT | Performed by: STUDENT IN AN ORGANIZED HEALTH CARE EDUCATION/TRAINING PROGRAM

## 2023-10-27 PROCEDURE — 84100 ASSAY OF PHOSPHORUS: CPT | Performed by: STUDENT IN AN ORGANIZED HEALTH CARE EDUCATION/TRAINING PROGRAM

## 2023-10-27 PROCEDURE — 85025 COMPLETE CBC W/AUTO DIFF WBC: CPT | Performed by: STUDENT IN AN ORGANIZED HEALTH CARE EDUCATION/TRAINING PROGRAM

## 2023-10-27 PROCEDURE — 96360 HYDRATION IV INFUSION INIT: CPT

## 2023-10-27 PROCEDURE — 36415 COLL VENOUS BLD VENIPUNCTURE: CPT | Performed by: STUDENT IN AN ORGANIZED HEALTH CARE EDUCATION/TRAINING PROGRAM

## 2023-10-27 PROCEDURE — 83735 ASSAY OF MAGNESIUM: CPT | Performed by: STUDENT IN AN ORGANIZED HEALTH CARE EDUCATION/TRAINING PROGRAM

## 2023-10-27 PROCEDURE — 3052F HG A1C>EQUAL 8.0%<EQUAL 9.0%: CPT | Performed by: ORTHOPAEDIC SURGERY

## 2023-10-27 PROCEDURE — 84484 ASSAY OF TROPONIN QUANT: CPT | Performed by: STUDENT IN AN ORGANIZED HEALTH CARE EDUCATION/TRAINING PROGRAM

## 2023-10-27 RX ORDER — KETOROLAC TROMETHAMINE 10 MG/1
10 TABLET, FILM COATED ORAL EVERY 6 HOURS PRN
Qty: 15 TABLET | Refills: 0 | Status: SHIPPED | OUTPATIENT
Start: 2023-10-27 | End: 2023-11-01

## 2023-10-27 ASSESSMENT — PAIN - FUNCTIONAL ASSESSMENT
PAIN_FUNCTIONAL_ASSESSMENT: 0-10
PAIN_FUNCTIONAL_ASSESSMENT: 0-10

## 2023-10-27 ASSESSMENT — PAIN SCALES - GENERAL
PAINLEVEL_OUTOF10: 0 - NO PAIN
PAINLEVEL_OUTOF10: 10 - WORST POSSIBLE PAIN

## 2023-10-27 ASSESSMENT — PAIN DESCRIPTION - DESCRIPTORS: DESCRIPTORS: DULL;SHARP

## 2023-10-27 NOTE — PROGRESS NOTES
HPI:Manish Lance is a 63-year-old man, comes in with a 4-week history of right hip and leg pain.  It has been bad enough that he is going to the emergency room with 2 occasions.  He has had 3 Medrol Dosepaks.  He has not started physical therapy or had steroid injections.      ROS:  Reviewed on EMR and patient intake sheet.    PMH/SH:  Reviewed on EMR and patient intake sheet.    Exam:  Physical Exam    Constitutional: Well appearing; no acute distress  Eyes: pupils are equal and round  Psych: normal affect  Respiratory: non-labored breathing  Cardiovascular: regular rate and rhythm  GI: non-distended abdomen  Musculoskeletal: no pain with range of motion of the shoulders bilaterally; no signs of impingement  Neurologic: [4]/5 strength in the lower extremities bilaterally]; [+] straight leg raise; no clonus; negative babinski    Radiology:     CT scan demonstrates a calcified L5-S1 disc as well as a right paracentral disc herniation at C4-5 with lateral recess narrowing    Diagnosis:    Lumbar radiculopathy secondary to an L4-5 disc herniation    Assessment and Plan:   63-year-old male with acute lumbar radiculopathy secondary to L4-5 disc herniation.  I recommend physical therapy, steroid injections, and some oral Toradol.  If his symptoms do not improve, then he will need an MRI and follow-up.    We discussed the role of steroid injections in the treatment of recalcitrant pain.  I described the actual procedure involved in administering these injections as well as the potential benefits and risks of these injections.  These risk include, but are not limited to, those of infection, spinal fluid leaks, epidural hematoma, continued pain or paraesthesia, increased risk for insufficiency fractures, and the potential need for ongoing treatment.  I discussed the potential that these injections can loose their efficacy over time and should not be viewed as a cure to the underlying problem but, rather, a pain  management modality.    The patient was in agreement with the plan. At the end of the visit today, the patient felt that all questions had been answered satisfactorily.  The patient was pleased with the visit and very appreciative for the care rendered.     Thank you very much for the kind referral.  It is a privilege, and a pleasure, to partner with you in the care of your patients.  I would be delighted to assist you with any further consultations as needed.  Please feel free to have your patients refer to my website, [email or web address], for patient education materials regarding treatment options for common spinal conditions.        Joe Ruvalcaba MD    Chief of Spine Surgery, [facility]  Director of Spine Service, [facility]  , Department of Orthopaedics  [facility] [employer]  [address] [place] [address]  [email or web address]  P: [phone]    This note was dictated with voice recognition software.  It has not been proofread for grammatical errors, typographical mistakes or other semantic inconsistencies.

## 2023-10-28 ENCOUNTER — APPOINTMENT (OUTPATIENT)
Dept: RADIOLOGY | Facility: HOSPITAL | Age: 63
End: 2023-10-28
Payer: COMMERCIAL

## 2023-10-28 ENCOUNTER — APPOINTMENT (OUTPATIENT)
Dept: CARDIOLOGY | Facility: HOSPITAL | Age: 63
End: 2023-10-28
Payer: COMMERCIAL

## 2023-10-28 VITALS
SYSTOLIC BLOOD PRESSURE: 129 MMHG | BODY MASS INDEX: 22.9 KG/M2 | DIASTOLIC BLOOD PRESSURE: 77 MMHG | RESPIRATION RATE: 20 BRPM | TEMPERATURE: 96.1 F | HEIGHT: 70 IN | HEART RATE: 84 BPM | WEIGHT: 160 LBS | OXYGEN SATURATION: 98 %

## 2023-10-28 PROBLEM — Z79.4 INSULIN DEPENDENT TYPE 2 DIABETES MELLITUS (MULTI): Status: ACTIVE | Noted: 2023-10-28

## 2023-10-28 PROBLEM — F41.9 ANXIETY AND DEPRESSION: Chronic | Status: ACTIVE | Noted: 2023-10-28

## 2023-10-28 PROBLEM — F32.A ANXIETY AND DEPRESSION: Chronic | Status: ACTIVE | Noted: 2023-10-28

## 2023-10-28 PROBLEM — R55 SYNCOPE, UNSPECIFIED SYNCOPE TYPE: Status: RESOLVED | Noted: 2023-10-28 | Resolved: 2023-10-28

## 2023-10-28 PROBLEM — N40.0 BENIGN PROSTATIC HYPERPLASIA WITHOUT LOWER URINARY TRACT SYMPTOMS: Chronic | Status: ACTIVE | Noted: 2023-10-28

## 2023-10-28 PROBLEM — E11.9 INSULIN DEPENDENT TYPE 2 DIABETES MELLITUS (MULTI): Status: ACTIVE | Noted: 2023-10-28

## 2023-10-28 PROBLEM — R55 SYNCOPE, UNSPECIFIED SYNCOPE TYPE: Status: ACTIVE | Noted: 2023-10-28

## 2023-10-28 LAB
ALBUMIN SERPL BCP-MCNC: 3.4 G/DL (ref 3.4–5)
ALBUMIN SERPL BCP-MCNC: 3.5 G/DL (ref 3.4–5)
ALP SERPL-CCNC: 93 U/L (ref 33–136)
ALT SERPL W P-5'-P-CCNC: 33 U/L (ref 10–52)
ANION GAP BLDV CALCULATED.4IONS-SCNC: 15 MMOL/L (ref 10–25)
ANION GAP SERPL CALC-SCNC: 14 MMOL/L (ref 10–20)
ANION GAP SERPL CALC-SCNC: 9 MMOL/L (ref 10–20)
AST SERPL W P-5'-P-CCNC: 15 U/L (ref 9–39)
B-OH-BUTYR SERPL-SCNC: 0.17 MMOL/L (ref 0.02–0.27)
BASE EXCESS BLDV CALC-SCNC: -4.4 MMOL/L (ref -2–3)
BILIRUB SERPL-MCNC: 0.3 MG/DL (ref 0–1.2)
BNP SERPL-MCNC: 16 PG/ML (ref 0–99)
BODY TEMPERATURE: 37 DEGREES CELSIUS
BUN SERPL-MCNC: 32 MG/DL (ref 6–23)
BUN SERPL-MCNC: 36 MG/DL (ref 6–23)
CA-I BLDV-SCNC: 1.17 MMOL/L (ref 1.1–1.33)
CALCIUM SERPL-MCNC: 8.2 MG/DL (ref 8.6–10.3)
CALCIUM SERPL-MCNC: 8.5 MG/DL (ref 8.6–10.3)
CARDIAC TROPONIN I PNL SERPL HS: 3 NG/L (ref 0–20)
CARDIAC TROPONIN I PNL SERPL HS: 3 NG/L (ref 0–20)
CHLORIDE BLDV-SCNC: 100 MMOL/L (ref 98–107)
CHLORIDE SERPL-SCNC: 103 MMOL/L (ref 98–107)
CHLORIDE SERPL-SCNC: 108 MMOL/L (ref 98–107)
CHOLEST SERPL-MCNC: 86 MG/DL (ref 0–199)
CHOLESTEROL/HDL RATIO: 2.8
CO2 SERPL-SCNC: 21 MMOL/L (ref 21–32)
CO2 SERPL-SCNC: 25 MMOL/L (ref 21–32)
CREAT SERPL-MCNC: 1.19 MG/DL (ref 0.5–1.3)
CREAT SERPL-MCNC: 1.37 MG/DL (ref 0.5–1.3)
EJECTION FRACTION APICAL 4 CHAMBER: 56.7
ERYTHROCYTE [DISTWIDTH] IN BLOOD BY AUTOMATED COUNT: 14.9 % (ref 11.5–14.5)
EST. AVERAGE GLUCOSE BLD GHB EST-MCNC: 249 MG/DL
FLUAV RNA RESP QL NAA+PROBE: NOT DETECTED
FLUBV RNA RESP QL NAA+PROBE: NOT DETECTED
GFR SERPL CREATININE-BSD FRML MDRD: 58 ML/MIN/1.73M*2
GFR SERPL CREATININE-BSD FRML MDRD: 69 ML/MIN/1.73M*2
GLUCOSE BLD MANUAL STRIP-MCNC: 128 MG/DL (ref 74–99)
GLUCOSE BLD MANUAL STRIP-MCNC: 221 MG/DL (ref 74–99)
GLUCOSE BLDV-MCNC: 385 MG/DL (ref 74–99)
GLUCOSE SERPL-MCNC: 169 MG/DL (ref 74–99)
GLUCOSE SERPL-MCNC: 360 MG/DL (ref 74–99)
HBA1C MFR BLD: 10.3 %
HCO3 BLDV-SCNC: 22.1 MMOL/L (ref 22–26)
HCT VFR BLD AUTO: 37.5 % (ref 41–52)
HCT VFR BLD EST: 39 % (ref 41–52)
HDLC SERPL-MCNC: 31 MG/DL
HGB BLD-MCNC: 12.2 G/DL (ref 13.5–17.5)
HGB BLDV-MCNC: 12.9 G/DL (ref 13.5–17.5)
HOLD SPECIMEN: NORMAL
INHALED O2 CONCENTRATION: 60 %
LACTATE BLDV-SCNC: 2.7 MMOL/L (ref 0.4–2)
LDLC SERPL CALC-MCNC: 40 MG/DL
MAGNESIUM SERPL-MCNC: 1.88 MG/DL (ref 1.6–2.4)
MCH RBC QN AUTO: 29 PG (ref 26–34)
MCHC RBC AUTO-ENTMCNC: 32.5 G/DL (ref 32–36)
MCV RBC AUTO: 89 FL (ref 80–100)
NON HDL CHOLESTEROL: 55 MG/DL (ref 0–149)
NRBC BLD-RTO: 0 /100 WBCS (ref 0–0)
OXYHGB MFR BLDV: 75 % (ref 45–75)
PCO2 BLDV: 45 MM HG (ref 41–51)
PH BLDV: 7.3 PH (ref 7.33–7.43)
PHOSPHATE SERPL-MCNC: 4.5 MG/DL (ref 2.5–4.9)
PHOSPHATE SERPL-MCNC: 5.1 MG/DL (ref 2.5–4.9)
PLATELET # BLD AUTO: 134 X10*3/UL (ref 150–450)
PMV BLD AUTO: 9.5 FL (ref 7.5–11.5)
PO2 BLDV: 47 MM HG (ref 35–45)
POTASSIUM BLDV-SCNC: 4.4 MMOL/L (ref 3.5–5.3)
POTASSIUM SERPL-SCNC: 4.2 MMOL/L (ref 3.5–5.3)
POTASSIUM SERPL-SCNC: 4.4 MMOL/L (ref 3.5–5.3)
PROT SERPL-MCNC: 5.1 G/DL (ref 6.4–8.2)
RBC # BLD AUTO: 4.21 X10*6/UL (ref 4.5–5.9)
SAO2 % BLDV: 76 % (ref 45–75)
SARS-COV-2 RNA RESP QL NAA+PROBE: NOT DETECTED
SODIUM BLDV-SCNC: 133 MMOL/L (ref 136–145)
SODIUM SERPL-SCNC: 134 MMOL/L (ref 136–145)
SODIUM SERPL-SCNC: 138 MMOL/L (ref 136–145)
TRIGL SERPL-MCNC: 76 MG/DL (ref 0–149)
TSH SERPL-ACNC: 1.56 MIU/L (ref 0.44–3.98)
VLDL: 15 MG/DL (ref 0–40)
WBC # BLD AUTO: 6.5 X10*3/UL (ref 4.4–11.3)

## 2023-10-28 PROCEDURE — 93306 TTE W/DOPPLER COMPLETE: CPT | Performed by: INTERNAL MEDICINE

## 2023-10-28 PROCEDURE — 36415 COLL VENOUS BLD VENIPUNCTURE: CPT | Performed by: STUDENT IN AN ORGANIZED HEALTH CARE EDUCATION/TRAINING PROGRAM

## 2023-10-28 PROCEDURE — 82947 ASSAY GLUCOSE BLOOD QUANT: CPT | Mod: 59

## 2023-10-28 PROCEDURE — 2500000001 HC RX 250 WO HCPCS SELF ADMINISTERED DRUGS (ALT 637 FOR MEDICARE OP): Performed by: INTERNAL MEDICINE

## 2023-10-28 PROCEDURE — 85018 HEMOGLOBIN: CPT | Mod: 59 | Performed by: STUDENT IN AN ORGANIZED HEALTH CARE EDUCATION/TRAINING PROGRAM

## 2023-10-28 PROCEDURE — 84443 ASSAY THYROID STIM HORMONE: CPT | Performed by: INTERNAL MEDICINE

## 2023-10-28 PROCEDURE — 84484 ASSAY OF TROPONIN QUANT: CPT | Performed by: STUDENT IN AN ORGANIZED HEALTH CARE EDUCATION/TRAINING PROGRAM

## 2023-10-28 PROCEDURE — 83036 HEMOGLOBIN GLYCOSYLATED A1C: CPT | Performed by: INTERNAL MEDICINE

## 2023-10-28 PROCEDURE — 70450 CT HEAD/BRAIN W/O DYE: CPT

## 2023-10-28 PROCEDURE — 82947 ASSAY GLUCOSE BLOOD QUANT: CPT | Mod: 59 | Performed by: INTERNAL MEDICINE

## 2023-10-28 PROCEDURE — 71045 X-RAY EXAM CHEST 1 VIEW: CPT | Mod: FY

## 2023-10-28 PROCEDURE — 2500000004 HC RX 250 GENERAL PHARMACY W/ HCPCS (ALT 636 FOR OP/ED): Performed by: STUDENT IN AN ORGANIZED HEALTH CARE EDUCATION/TRAINING PROGRAM

## 2023-10-28 PROCEDURE — 85027 COMPLETE CBC AUTOMATED: CPT | Performed by: INTERNAL MEDICINE

## 2023-10-28 PROCEDURE — 99223 1ST HOSP IP/OBS HIGH 75: CPT | Performed by: INTERNAL MEDICINE

## 2023-10-28 PROCEDURE — 93306 TTE W/DOPPLER COMPLETE: CPT

## 2023-10-28 PROCEDURE — 99239 HOSP IP/OBS DSCHRG MGMT >30: CPT | Performed by: INTERNAL MEDICINE

## 2023-10-28 PROCEDURE — 70450 CT HEAD/BRAIN W/O DYE: CPT | Performed by: RADIOLOGY

## 2023-10-28 PROCEDURE — 2500000004 HC RX 250 GENERAL PHARMACY W/ HCPCS (ALT 636 FOR OP/ED): Performed by: INTERNAL MEDICINE

## 2023-10-28 PROCEDURE — 80061 LIPID PANEL: CPT | Performed by: INTERNAL MEDICINE

## 2023-10-28 PROCEDURE — 71045 X-RAY EXAM CHEST 1 VIEW: CPT | Performed by: RADIOLOGY

## 2023-10-28 RX ORDER — INSULIN LISPRO 100 [IU]/ML
0-10 INJECTION, SOLUTION INTRAVENOUS; SUBCUTANEOUS
Status: DISCONTINUED | OUTPATIENT
Start: 2023-10-28 | End: 2023-10-28 | Stop reason: HOSPADM

## 2023-10-28 RX ORDER — ONDANSETRON HYDROCHLORIDE 2 MG/ML
4 INJECTION, SOLUTION INTRAVENOUS EVERY 6 HOURS PRN
Status: DISCONTINUED | OUTPATIENT
Start: 2023-10-28 | End: 2023-10-28 | Stop reason: HOSPADM

## 2023-10-28 RX ORDER — HEPARIN SODIUM 5000 [USP'U]/ML
5000 INJECTION, SOLUTION INTRAVENOUS; SUBCUTANEOUS EVERY 8 HOURS SCHEDULED
Status: DISCONTINUED | OUTPATIENT
Start: 2023-10-28 | End: 2023-10-28 | Stop reason: HOSPADM

## 2023-10-28 RX ORDER — ALBUTEROL SULFATE 90 UG/1
1 AEROSOL, METERED RESPIRATORY (INHALATION) EVERY 4 HOURS PRN
Status: DISCONTINUED | OUTPATIENT
Start: 2023-10-28 | End: 2023-10-28 | Stop reason: HOSPADM

## 2023-10-28 RX ORDER — FINASTERIDE 5 MG/1
5 TABLET, FILM COATED ORAL DAILY
Qty: 30 TABLET | Refills: 11 | Status: SHIPPED | OUTPATIENT
Start: 2023-10-28 | End: 2024-10-27

## 2023-10-28 RX ORDER — ACETAMINOPHEN 325 MG/1
650 TABLET ORAL EVERY 4 HOURS PRN
Status: DISCONTINUED | OUTPATIENT
Start: 2023-10-28 | End: 2023-10-28 | Stop reason: HOSPADM

## 2023-10-28 RX ORDER — DEXTROSE MONOHYDRATE 100 MG/ML
0.3 INJECTION, SOLUTION INTRAVENOUS ONCE AS NEEDED
Status: DISCONTINUED | OUTPATIENT
Start: 2023-10-28 | End: 2023-10-28 | Stop reason: HOSPADM

## 2023-10-28 RX ORDER — EZETIMIBE 10 MG/1
10 TABLET ORAL DAILY
Status: DISCONTINUED | OUTPATIENT
Start: 2023-10-28 | End: 2023-10-28

## 2023-10-28 RX ORDER — PANTOPRAZOLE SODIUM 40 MG/1
40 TABLET, DELAYED RELEASE ORAL
Status: CANCELLED | OUTPATIENT
Start: 2023-10-28

## 2023-10-28 RX ORDER — TRAMADOL HYDROCHLORIDE 50 MG/1
1 TABLET ORAL EVERY 6 HOURS PRN
COMMUNITY

## 2023-10-28 RX ORDER — EZETIMIBE 10 MG/1
10 TABLET ORAL NIGHTLY
Status: DISCONTINUED | OUTPATIENT
Start: 2023-10-28 | End: 2023-10-28 | Stop reason: HOSPADM

## 2023-10-28 RX ORDER — ATORVASTATIN CALCIUM 40 MG/1
40 TABLET, FILM COATED ORAL NIGHTLY
Status: DISCONTINUED | OUTPATIENT
Start: 2023-10-28 | End: 2023-10-28 | Stop reason: HOSPADM

## 2023-10-28 RX ORDER — ATORVASTATIN CALCIUM 40 MG/1
40 TABLET, FILM COATED ORAL DAILY
Status: DISCONTINUED | OUTPATIENT
Start: 2023-10-28 | End: 2023-10-28

## 2023-10-28 RX ORDER — ASPIRIN 81 MG/1
81 TABLET ORAL DAILY
Status: DISCONTINUED | OUTPATIENT
Start: 2023-10-28 | End: 2023-10-28 | Stop reason: HOSPADM

## 2023-10-28 RX ORDER — DEXTROSE 50 % IN WATER (D50W) INTRAVENOUS SYRINGE
25
Status: DISCONTINUED | OUTPATIENT
Start: 2023-10-28 | End: 2023-10-28 | Stop reason: HOSPADM

## 2023-10-28 RX ORDER — DOCUSATE SODIUM 100 MG/1
1 CAPSULE, LIQUID FILLED ORAL 2 TIMES DAILY
COMMUNITY

## 2023-10-28 RX ADMIN — ASPIRIN 81 MG: 81 TABLET, COATED ORAL at 11:22

## 2023-10-28 RX ADMIN — HEPARIN SODIUM 5000 UNITS: 5000 INJECTION INTRAVENOUS; SUBCUTANEOUS at 07:23

## 2023-10-28 RX ADMIN — SODIUM CHLORIDE, POTASSIUM CHLORIDE, SODIUM LACTATE AND CALCIUM CHLORIDE 1000 ML: 600; 310; 30; 20 INJECTION, SOLUTION INTRAVENOUS at 00:20

## 2023-10-28 ASSESSMENT — ENCOUNTER SYMPTOMS
LIGHT-HEADEDNESS: 1
DIZZINESS: 1
FATIGUE: 1

## 2023-10-28 ASSESSMENT — LIFESTYLE VARIABLES
EVER FELT BAD OR GUILTY ABOUT YOUR DRINKING: NO
HAVE PEOPLE ANNOYED YOU BY CRITICIZING YOUR DRINKING: NO
EVER HAD A DRINK FIRST THING IN THE MORNING TO STEADY YOUR NERVES TO GET RID OF A HANGOVER: NO
REASON UNABLE TO ASSESS: YES
HAVE YOU EVER FELT YOU SHOULD CUT DOWN ON YOUR DRINKING: NO

## 2023-10-28 ASSESSMENT — PAIN - FUNCTIONAL ASSESSMENT: PAIN_FUNCTIONAL_ASSESSMENT: 0-10

## 2023-10-28 ASSESSMENT — PAIN SCALES - GENERAL
PAINLEVEL_OUTOF10: 0 - NO PAIN
PAINLEVEL_OUTOF10: 0 - NO PAIN

## 2023-10-28 NOTE — ASSESSMENT & PLAN NOTE
Suspect vasovagal vs orthostatic   Pressures noted to be softer since presentation at 90s-100s/50s-70s  Holding antihypertensives  Telemetry Monitoring  Orthostatic Vital Signs pending completion  Echocardiogram = pending  Carotid Duplex = pending   Will order a CT Head without Contrast to rule out for acute processes but if persistent symptoms them may require MRI Brain/MRA Head & Neck

## 2023-10-28 NOTE — ASSESSMENT & PLAN NOTE
Continued on home medications once fully confirmed as patient is unsure of doses and reports recent modifications to several

## 2023-10-28 NOTE — DISCHARGE SUMMARY
Discharge Diagnosis  Syncope, unspecified syncope type    Issues Requiring Follow-Up  OP cardiology F/U for moderate aortic stenosis     Hospital Course   Manish Lance is a 63 y.o.  male with a past medical history significant for HTN, HLD, CAD s/p PCI of Cx and RCA with repeat laser PCI of Cx in 2020 and 2021, IDDM 2, diabetic neuropathy, anxiety, depression, chronic lower back pain and GERD.  Patient does have a history of gastric bypass which she states was greater than 10 years ago.  Per ED documentation and per the patient he states that he has lost over 100 pounds in the past 6 months but per chart review the patient has lost about 60 pounds since about 2018.  He was extensively worked up in the outpatient setting with gastroenterology which noted fairly abrupt unremarkable EGD with some gastritis and no other concerning findings.  He does follow with Dr. Castillo from cardiology and Dr. Marti from gastroenterology.     Patient presented to the ED with 2 episodes of near syncope as well as 1 syncopal episode.  He states that the majority of the day yesterday he had been feeling very lethargic and having to take several naps throughout the day.  Upon waking up from a nap in the early evening yesterday when he went to stand up he felt very lightheaded, dizzy and felt like his vision was closing in on itself.  He was able to sit back down promptly but did feel like he was about to pass out and this lasted about 1 minute and then he was able to get back up and go about his way.  About 10 minutes after while he was walking over to his kitchen he again suddenly felt very lightheaded, dizzy and felt like he was going to pass out again.  He states that his wife helped him back up stairs and he laid down for a bit.  Upon again trying to wake up he felt lightheaded, dizzy and then when he went to stand up he passed out but when he fell he did not hit his head.  He states that he has had a history of syncopal  episodes but does not know exactly what the etiology was but states that it was related to his medications.  He has had a history of low pressures which he states his blood pressure medications were adjusted extensively in the past.  He denies any recent sick contacts, chemical/environmental exposures, changes in dietary habits or any recent traumatic events/falls.  He denies any fevers, chills, night sweats, vision changes, auditory changes, change in taste/smell, loss of bowel/bladder control, vertigo, seizure-like activity, chest pain, palpitations, shortness of breath, cough, wheezing, congestion, hemoptysis, hematemesis, abdominal pain, nausea, vomiting, diarrhea, constipation, dysuria, hematuria, dyschezia, hematochezia any lateralizing motor/sensory deficits other than noted above.     ED course:  1.  Vital signs on presentation: Temperature of 35.6 °C, heart rate of 62, respirations 20, blood pressure 117/75, pulse ox of 98% on room air  2.  EKG noted sinus rhythm with a rate of 67, QTc of 429, no acute ST segment changes  3.  White blood cell count of 5.9  4.  Hemoglobin/hematocrit of 12.6/36.9  5.  Glucose of 360  6.  Sodium of 134  7.  BUNs/creatinine of 36/1.37  8.  Baseline creatinine of around 0.8-0.9  9.  High-sensitivity troponin I of 3 with a repeat of 3  10.  Beta hydroxybutyrate of 0.17  11.  Influenza A/B PCR negative  12.  COVID-19 PCR negative  13.  VBG: pH is 7.3, PCO2 of 45, PO2 of 47, SO2 of 76, tachycardia to bicarb of 22.1  14.  Urinalysis was grossly unremarkable for any infectious etiologies  15.  XR chest: Noted no acute cardiopulmonary findings  16.  Orthostatics = pending     A 12 point ROS was performed with the patient denying any complaints at this time aside from those listed in the HPI above.    Syncope, unspecified syncope type  Assessment & Plan  -Based on patients HPI this presentation likely represent an adverse effect of Flomax.  We will start Finasteride. He may need ot  continue using Flomax temporarily while waiting for the finasteride to start shrinking his prostate, depending on how bad his nocturia is without the Flomax. I discussed the risks of orthostasis and precautions to avoid falls with him and his wife extensively.   -Of note patients TTE also revealed moderate aortic stenosis which, theoretically, could also be contributing to syncope. I will schedule patient for OP cardiology F/U.     Benign prostatic hyperplasia without lower urinary tract symptoms  Anxiety and depression  Insulin dependent type 2 diabetes mellitus (CMS/HCC)  Hyperlipidemia  GERD (gastroesophageal reflux disease)  CAD in native artery  Benign essential hypertension         Pertinent Physical Exam At Time of Discharge  Physical Exam  Vitals and nursing note reviewed.   Constitutional:       General: He is not in acute distress.     Appearance: He is not ill-appearing or toxic-appearing.   HENT:      Head: Normocephalic and atraumatic.   Cardiovascular:      Rate and Rhythm: Normal rate and regular rhythm.      Pulses: Normal pulses.      Heart sounds: Normal heart sounds. No murmur heard.     No friction rub. No gallop.   Pulmonary:      Effort: Pulmonary effort is normal. No respiratory distress.      Breath sounds: No wheezing, rhonchi or rales.   Abdominal:      General: Abdomen is flat. Bowel sounds are normal. There is no distension.      Palpations: Abdomen is soft.      Tenderness: There is no abdominal tenderness.   Musculoskeletal:      Cervical back: Neck supple. No rigidity or tenderness.   Skin:     General: Skin is warm and dry.   Neurological:      General: No focal deficit present.   Psychiatric:         Mood and Affect: Mood normal.         Outpatient Follow-Up  Future Appointments   Date Time Provider Department Center   10/30/2023  1:30 PM Armando Batres MD [facility]         Panchito Ochoa MD

## 2023-10-28 NOTE — DISCHARGE INSTRUCTIONS
I prescribed the medication finasteride as a potential alternative to Flomax (tamsulosin). I believe Flomax is likely responsible for the episodes you have been experiencing. The finasteride will take about one month before its effects are noticed. You may want to continue the Flomax during that time. If this is the case you must be cognizant of how it effects you and make sure you do not pass out, fall, and get injured.

## 2023-10-28 NOTE — ASSESSMENT & PLAN NOTE
No complaints at this time and unremarkable EKG  Telemetry Monitoring  Continued outpatient follow up   Continued on home medications once fully confirmed as patient is unsure of doses and reports recent modifications to several

## 2023-10-28 NOTE — ED PROVIDER NOTES
External Records Reviewed:     JUANA Lance is a 63 y.o. male With history of ACS, diabetes, hypertension, asthma who is presenting with near syncope.  Patient states he had a full day and was feeling tired.  He notes he went to take a nap.  He states when he woke up from the nap he went to stand up and felt very lightheaded.  He notes he had to sit back down and felt like he was going to pass out.  Patient states this lasted for about a minute.  He notes he then got up and walked over to the kitchen where he subsequently felt lightheaded again.  Patient states his wife helped him upstairs and he laid down for a little bit.  He notes when he got up he again felt lightheaded and patient's wife thinks he may have passed out for a brief second.  Patient did not fall or hit his head.  Patient is denying any chest pain, shortness of breath, abdominal pain, nausea or vomiting.  Denies any recent fevers or illnesses.  Patient does note that he has had a 100 pound weight loss over the past 6 months but notes he has had extensive work-up for this.  Patient states he has had syncope episodes before but had all his medications changed and adjusted because of his blood pressure.  Patient notes his blood sugars have been more elevated than usual for the past few days.    PMH  Past Medical History:   Diagnosis Date    Diabetes mellitus (CMS/[facility])     Personal history of other diseases of the circulatory system 10/16/2020    History of heart disease    Personal history of other diseases of the circulatory system 10/16/2020    History of hypertension       Meds  Current Outpatient Medications   Medication Instructions    acetaminophen (TYLENOL) 325 mg, oral, Every 6 hours PRN, PATIENT MAY TAKE ONE TO TWO TABS    albuterol (ProAir HFA) 90 mcg/actuation inhaler 1 puff, inhalation, Every 4 hours PRN    allopurinol (ZYLOPRIM) 300 mg, oral, Daily    amLODIPine (NORVASC) 10 mg, oral, Daily    aspirin-acetaminophen-caffeine  "(Excedrin Extra Strength) 250-250-65 mg tablet 1 tablet, oral, 3 times daily PRN    aspirin 81 mg, oral, CLARIFY DIRECTIONS    atorvastatin (LIPITOR) 40 mg, oral, Daily    buPROPion SR (WELLBUTRIN SR) 150 mg, oral, Daily    cholecalciferol (Vitamin D-3) 5,000 Units tablet 1 tablet, oral, CLARIFY DIRECTIONS    DULoxetine (CYMBALTA) 60 mg, oral, Daily    empagliflozin (JARDIANCE) 25 mg, oral, CLARIFY DIRECTIONS    ezetimibe (Zetia) 10 mg tablet 1 tablet, oral, Daily    ferrous sulfate 325 (65 Fe) MG tablet 65 mg of iron, oral, 3 times daily    fluticasone (Flonase) 50 mcg/actuation nasal spray nasal, CLARIFY DIRECTIONS    insulin glargine (Lantus U-100 Insulin) 100 unit/mL injection subcutaneous, CLARIFY DIRECTIONS    ketorolac (TORADOL) 10 mg, oral, Every 6 hours PRN    lisinopril 20 mg, oral, Daily    melatonin 3 mg capsule oral, CLARIFY DIRECTIONS    metFORMIN (Glucophage) 1,000 mg tablet 1 tablet, oral, Every 12 hours    methocarbamol (ROBAXIN) 500 mg, oral, 2 times daily    metoprolol tartrate (Lopressor) 25 mg tablet 1 tablet, oral, 2 times daily    nitroglycerin (NITROSTAT) 0.4 mg, sublingual, Every 5 min PRN, PLACE 1 TABLET UNDER THE TONGUE EVERY 5 MINUTES FOR UP TO 3 DOSES AS NEEDED FOR CHEST PAIN.CALL 911 IF PAIN PERSISTS.    omeprazole (PriLOSEC) 20 mg DR capsule oral, 2 times daily, CLARIFY DIRECTIONS    oxyCODONE-acetaminophen (Percocet) 5-325 mg tablet 1-2 tablets, oral, Every 6 hours PRN    pregabalin (Lyrica) 75 mg capsule oral, CLARIFY DIRECTIONS    tamsulosin (Flomax) 0.4 mg 24 hr capsule 1 capsule, oral, Daily    ticagrelor (BRILINTA) 90 mg, oral, 2 times daily       Allergies  No Known Allergies     SHx       ------------------------------------------------------------------------------------------------------------------------------------------    /75 (BP Location: Left arm)   Pulse 62   Temp 35.6 °C (96.1 °F) (Tympanic)   Resp 20   Ht 1.778 m (5' 10\")   Wt 72.6 kg (160 lb)   SpO2 98%  "  BMI 22.96 kg/m²     Physical Exam  Constitutional:       General: He is not in acute distress.  HENT:      Head: Normocephalic and atraumatic.      Mouth/Throat:      Mouth: Mucous membranes are moist.   Eyes:      Extraocular Movements: Extraocular movements intact.      Conjunctiva/sclera: Conjunctivae normal.      Pupils: Pupils are equal, round, and reactive to light.   Cardiovascular:      Rate and Rhythm: Normal rate and regular rhythm.      Heart sounds: No murmur heard.     No gallop.   Pulmonary:      Effort: Pulmonary effort is normal. No respiratory distress.      Breath sounds: Normal breath sounds. No wheezing.   Abdominal:      General: There is no distension.      Palpations: Abdomen is soft.      Tenderness: There is no abdominal tenderness. There is no guarding.   Musculoskeletal:         General: No swelling or signs of injury. Normal range of motion.   Skin:     General: Skin is warm and dry.      Findings: No lesion or rash.   Neurological:      General: No focal deficit present.      Mental Status: He is alert and oriented to person, place, and time. Mental status is at baseline.   Psychiatric:         Mood and Affect: Mood normal.         Judgment: Judgment normal.          ------------------------------------------------------------------------------------------------------------------------------------------    Medical Decision Making: Patient is a 63-year-old male who is presenting with near syncope.  Patient is hemodynamically stable, afebrile, nontoxic-appearing on presentation.  Patient is endorsing 3 episodes of near syncope and a possible 1 episode of actual syncope.  All episodes sound like they happen when the patient was going from lying to standing or sitting to standing.  Patient presentation sounds consistent with an orthostatic hypotension.  However, patient does have cardiac history and ACS is on the differential as well as arrhythmia.  Electrolyte abnormalities and infection  are also on the differential especially as patient has recently had elevated blood sugars and been feeling more tired.  Lab work will be ordered.  Low suspicion for neurologic cause of his symptoms as he is neuro intact at this time.  Patient's EKG was reassuring without ischemic changes or arrhythmia.  Lab work was notable for a point-of-care blood sugar of 307.  CMP showed hyperglycemia with a blood sugar of 360.  Creatinine is elevated to 1.37 consistent with an CRISSY.  Patient's venous blood gas showed a nonanion gap metabolic acidosis with a pH of 7.3.  Bicarb is normal and there is no anion gap and beta hydroxybutyrate is normal and patient is not in DKA at this time.  Troponin was normal at 3 and a delta troponin was sent.  COVID and flu swabs were negative. Chest x-ray showed no acute cardiopulmonary process.  Delta troponin was negative.  Patient remained hemodynamically stable however given his 3 episodes of near syncope versus syncope, patient would benefit from further inpatient work-up.  Patient is in agreement with this plan.  Patient was admitted to the internal medicine service for further treatment and management.    Independent EKG interpretation:  Sinus rhythm, rate 67 bpm, normal axis, QTc 429 ms, no ST segment elevations or depressions.  Not concerning for STEMI.    Discussed with: Admitting provider    Keesha Smiley MD  Emergency Medicine       Keesha Smiley MD  10/28/23 0038

## 2023-10-28 NOTE — ASSESSMENT & PLAN NOTE
Hgb A1c = pending  ISS - Mild AC/HS  Continued on home medications once fully confirmed as patient is unsure of doses and reports recent modifications to several  Diabetic Diet

## 2023-10-28 NOTE — H&P
History Of Present Illness  Manish Lance is a 63 y.o.  male with a past medical history significant for HTN, HLD, CAD s/p PCI of Cx and RCA with repeat laser PCI of Cx in 2020 and 2021, IDDM 2, diabetic neuropathy, anxiety, depression, chronic lower back pain and GERD.  Patient does have a history of gastric bypass which she states was greater than 10 years ago.  Per ED documentation and per the patient he states that he has lost over 100 pounds in the past 6 months but per chart review the patient has lost about 60 pounds since about 2018.  He was extensively worked up in the outpatient setting with gastroenterology which noted fairly abrupt unremarkable EGD with some gastritis and no other concerning findings.  He does follow with Dr. Castillo from cardiology and Dr. Marti from gastroenterology.    Patient presented to the ED with 2 episodes of near syncope as well as 1 syncopal episode.  He states that the majority of the day yesterday he had been feeling very lethargic and having to take several naps throughout the day.  Upon waking up from a nap in the early evening yesterday when he went to stand up he felt very lightheaded, dizzy and felt like his vision was closing in on itself.  He was able to sit back down promptly but did feel like he was about to pass out and this lasted about 1 minute and then he was able to get back up and go about his way.  About 10 minutes after while he was walking over to his kitchen he again suddenly felt very lightheaded, dizzy and felt like he was going to pass out again.  He states that his wife helped him back up stairs and he laid down for a bit.  Upon again trying to wake up he felt lightheaded, dizzy and then when he went to stand up he passed out but when he fell he did not hit his head.  He states that he has had a history of syncopal episodes but does not know exactly what the etiology was but states that it was related to his medications.  He has had a history  of low pressures which he states his blood pressure medications were adjusted extensively in the past.  He denies any recent sick contacts, chemical/environmental exposures, changes in dietary habits or any recent traumatic events/falls.  He denies any fevers, chills, night sweats, vision changes, auditory changes, change in taste/smell, loss of bowel/bladder control, vertigo, seizure-like activity, chest pain, palpitations, shortness of breath, cough, wheezing, congestion, hemoptysis, hematemesis, abdominal pain, nausea, vomiting, diarrhea, constipation, dysuria, hematuria, dyschezia, hematochezia any lateralizing motor/sensory deficits other than noted above.    ED course:  1.  Vital signs on presentation: Temperature of 35.6 °C, heart rate of 62, respirations 20, blood pressure 117/75, pulse ox of 98% on room air  2.  EKG noted sinus rhythm with a rate of 67, QTc of 429, no acute ST segment changes  3.  White blood cell count of 5.9  4.  Hemoglobin/hematocrit of 12.6/36.9  5.  Glucose of 360  6.  Sodium of 134  7.  BUNs/creatinine of 36/1.37  8.  Baseline creatinine of around 0.8-0.9  9.  High-sensitivity troponin I of 3 with a repeat of 3  10.  Beta hydroxybutyrate of 0.17  11.  Influenza A/B PCR negative  12.  COVID-19 PCR negative  13.  VBG: pH is 7.3, PCO2 of 45, PO2 of 47, SO2 of 76, tachycardia to bicarb of 22.1  14.  Urinalysis was grossly unremarkable for any infectious etiologies  15.  XR chest: Noted no acute cardiopulmonary findings  16.  Orthostatics = pending    A 12 point ROS was performed with the patient denying any complaints at this time aside from those listed in the HPI above.    Past Medical History  He has a past medical history of Diabetes mellitus (CMS/[facility]), Personal history of other diseases of the circulatory system (10/16/2020), and Personal history of other diseases of the circulatory system (10/16/2020).    Surgical History  He has a past surgical history that includes Coronary  angioplasty (07/14/2017); Gastric bypass (07/14/2017); Back surgery (07/14/2017); and Carpal tunnel release (07/14/2017).     Social History  He has no history on file for tobacco use, alcohol use, and drug use.    Family History  Family History   Problem Relation Name Age of Onset    Coronary artery disease Mother      Coronary artery disease Father      Coronary artery disease Brother          Allergies  Patient has no known allergies.    Review of Systems   Constitutional:  Positive for fatigue.   Neurological:  Positive for dizziness, syncope and light-headedness.   All other systems reviewed and are negative.       Physical Exam  Vitals and nursing note reviewed.   Constitutional:       General: He is not in acute distress.     Appearance: Normal appearance. He is not ill-appearing, toxic-appearing or diaphoretic.   HENT:      Head: Normocephalic and atraumatic.      Mouth/Throat:      Mouth: Mucous membranes are moist.      Pharynx: Oropharynx is clear.   Eyes:      Extraocular Movements: Extraocular movements intact.      Conjunctiva/sclera: Conjunctivae normal.      Pupils: Pupils are equal, round, and reactive to light.   Neck:      Vascular: No carotid bruit.   Cardiovascular:      Rate and Rhythm: Normal rate and regular rhythm.      Pulses: Normal pulses.      Heart sounds: Normal heart sounds. No murmur heard.  Pulmonary:      Effort: Pulmonary effort is normal. No respiratory distress.      Breath sounds: No wheezing or rhonchi.   Chest:      Chest wall: No tenderness.   Abdominal:      General: Abdomen is flat. Bowel sounds are normal. There is no distension.      Palpations: Abdomen is soft. There is no mass.      Tenderness: There is no abdominal tenderness. There is no guarding or rebound.   Musculoskeletal:         General: No swelling, deformity or signs of injury. Normal range of motion.      Cervical back: Normal range of motion and neck supple. No rigidity or tenderness.   Skin:     General:  "Skin is warm and dry.      Capillary Refill: Capillary refill takes less than 2 seconds.   Neurological:      General: No focal deficit present.      Mental Status: He is alert and oriented to person, place, and time.      Cranial Nerves: No cranial nerve deficit.      Sensory: No sensory deficit.      Motor: No weakness.      Gait: Gait normal.      Comments: Patient noted to be asymptomatic walking around the room, when standing a blood pressure was noted to be 106/68, pressure noted to be softer on the monitor in the 100s/60s   Psychiatric:         Mood and Affect: Mood normal.         Behavior: Behavior normal.         Thought Content: Thought content normal.         Judgment: Judgment normal.         SCHEDULED MEDICATIONS  aspirin, 81 mg, oral, Daily  atorvastatin, 40 mg, oral, Daily  ezetimibe, 10 mg, oral, Daily  heparin (porcine), 5,000 Units, subcutaneous, q8h URBÉN  insulin lispro, 0-10 Units, subcutaneous, Before meals & nightly           CONTINUOUS MEDICATIONS          PRN MEDICATIONS  PRN medications: acetaminophen, albuterol, dextrose 10 % in water (D10W), dextrose, glucagon, ondansetron      Last Recorded Vitals  Blood pressure 93/69, pulse 73, temperature 35.6 °C (96.1 °F), temperature source Tympanic, resp. rate 20, height 1.778 m (5' 10\"), weight 72.6 kg (160 lb), SpO2 97 %.    Relevant Results        Results for orders placed or performed during the hospital encounter of 10/27/23 (from the past 24 hour(s))   CBC and Auto Differential   Result Value Ref Range    WBC 5.9 4.4 - 11.3 x10*3/uL    nRBC 0.0 0.0 - 0.0 /100 WBCs    RBC 4.15 (L) 4.50 - 5.90 x10*6/uL    Hemoglobin 12.6 (L) 13.5 - 17.5 g/dL    Hematocrit 36.9 (L) 41.0 - 52.0 %    MCV 89 80 - 100 fL    MCH 30.4 26.0 - 34.0 pg    MCHC 34.1 32.0 - 36.0 g/dL    RDW 14.8 (H) 11.5 - 14.5 %    Platelets 132 (L) 150 - 450 x10*3/uL    MPV 9.6 7.5 - 11.5 fL    Neutrophils % 53.7 40.0 - 80.0 %    Immature Granulocytes %, Automated 0.3 0.0 - 0.9 %    " Lymphocytes % 27.0 13.0 - 44.0 %    Monocytes % 9.2 2.0 - 10.0 %    Eosinophils % 9.0 0.0 - 6.0 %    Basophils % 0.8 0.0 - 2.0 %    Neutrophils Absolute 3.16 1.20 - 7.70 x10*3/uL    Immature Granulocytes Absolute, Automated 0.02 0.00 - 0.70 x10*3/uL    Lymphocytes Absolute 1.59 1.20 - 4.80 x10*3/uL    Monocytes Absolute 0.54 0.10 - 1.00 x10*3/uL    Eosinophils Absolute 0.53 0.00 - 0.70 x10*3/uL    Basophils Absolute 0.05 0.00 - 0.10 x10*3/uL   Phosphorus   Result Value Ref Range    Phosphorus 5.1 (H) 2.5 - 4.9 mg/dL   Magnesium   Result Value Ref Range    Magnesium 1.88 1.60 - 2.40 mg/dL   Comprehensive metabolic panel   Result Value Ref Range    Glucose 360 (H) 74 - 99 mg/dL    Sodium 134 (L) 136 - 145 mmol/L    Potassium 4.4 3.5 - 5.3 mmol/L    Chloride 103 98 - 107 mmol/L    Bicarbonate 21 21 - 32 mmol/L    Anion Gap 14 10 - 20 mmol/L    Urea Nitrogen 36 (H) 6 - 23 mg/dL    Creatinine 1.37 (H) 0.50 - 1.30 mg/dL    eGFR 58 (L) >60 mL/min/1.73m*2    Calcium 8.2 (L) 8.6 - 10.3 mg/dL    Albumin 3.5 3.4 - 5.0 g/dL    Alkaline Phosphatase 93 33 - 136 U/L    Total Protein 5.1 (L) 6.4 - 8.2 g/dL    AST 15 9 - 39 U/L    Bilirubin, Total 0.3 0.0 - 1.2 mg/dL    ALT 33 10 - 52 U/L   Troponin I, High Sensitivity   Result Value Ref Range    Troponin I, High Sensitivity 3 0 - 20 ng/L   B-Type Natriuretic Peptide   Result Value Ref Range    BNP 16 0 - 99 pg/mL   Beta Hydroxybutyrate   Result Value Ref Range    Beta-Hydroxybutyrate 0.17 0.02 - 0.27 mmol/L   POCT GLUCOSE   Result Value Ref Range    POCT Glucose 307 (H) 74 - 99 mg/dL   Sars-CoV-2 PCR, Symptomatic   Result Value Ref Range    Coronavirus 2019, PCR Not Detected Not Detected   Influenza A, and B PCR   Result Value Ref Range    Flu A Result Not Detected Not Detected    Flu B Result Not Detected Not Detected   Urinalysis with Reflex Microscopic and Culture   Result Value Ref Range    Color, Urine Yellow Straw, Yellow    Appearance, Urine Clear Clear    Specific Gravity,  Urine 1.027 1.005 - 1.035    pH, Urine 5.0 5.0, 5.5, 6.0, 6.5, 7.0, 7.5, 8.0    Protein, Urine NEGATIVE NEGATIVE mg/dL    Glucose, Urine >=500 (3+) (A) NEGATIVE mg/dL    Blood, Urine NEGATIVE NEGATIVE    Ketones, Urine NEGATIVE NEGATIVE mg/dL    Bilirubin, Urine NEGATIVE NEGATIVE    Urobilinogen, Urine 2.0 (N) <2.0 mg/dL    Nitrite, Urine NEGATIVE NEGATIVE    Leukocyte Esterase, Urine NEGATIVE NEGATIVE   Extra Urine Gray Tube   Result Value Ref Range    Extra Tube Hold for add-ons.    Blood Gas Venous Full Panel   Result Value Ref Range    POCT pH, Venous 7.30 (L) 7.33 - 7.43 pH    POCT pCO2, Venous 45 41 - 51 mm Hg    POCT pO2, Venous 47 (H) 35 - 45 mm Hg    POCT SO2, Venous 76 (H) 45 - 75 %    POCT Oxy Hemoglobin, Venous 75.0 45.0 - 75.0 %    POCT Hematocrit Calculated, Venous 39.0 (L) 41.0 - 52.0 %    POCT Sodium, Venous 133 (L) 136 - 145 mmol/L    POCT Potassium, Venous 4.4 3.5 - 5.3 mmol/L    POCT Chloride, Venous 100 98 - 107 mmol/L    POCT Ionized Calicum, Venous 1.17 1.10 - 1.33 mmol/L    POCT Glucose, Venous 385 (H) 74 - 99 mg/dL    POCT Lactate, Venous 2.7 (H) 0.4 - 2.0 mmol/L    POCT Base Excess, Venous -4.4 (L) -2.0 - 3.0 mmol/L    POCT HCO3 Calculated, Venous 22.1 22.0 - 26.0 mmol/L    POCT Hemoglobin, Venous 12.9 (L) 13.5 - 17.5 g/dL    POCT Anion Gap, Venous 15.0 10.0 - 25.0 mmol/L    Patient Temperature 37.0 degrees Celsius    FiO2 60 %   Troponin I, High Sensitivity   Result Value Ref Range    Troponin I, High Sensitivity 3 0 - 20 ng/L   CBC   Result Value Ref Range    WBC 6.5 4.4 - 11.3 x10*3/uL    nRBC 0.0 0.0 - 0.0 /100 WBCs    RBC 4.21 (L) 4.50 - 5.90 x10*6/uL    Hemoglobin 12.2 (L) 13.5 - 17.5 g/dL    Hematocrit 37.5 (L) 41.0 - 52.0 %    MCV 89 80 - 100 fL    MCH 29.0 26.0 - 34.0 pg    MCHC 32.5 32.0 - 36.0 g/dL    RDW 14.9 (H) 11.5 - 14.5 %    Platelets 134 (L) 150 - 450 x10*3/uL    MPV 9.5 7.5 - 11.5 fL          XR chest 1 view    Result Date: 10/28/2023  Interpreted By:  Derrick Pleitez,  STUDY: XR CHEST 1 VIEW;  10/28/2023 12:19 am   INDICATION: Signs/Symptoms:cough.   COMPARISON: Chest x-ray 08/22/2022.   ACCESSION NUMBER(S): [id number]   ORDERING CLINICIAN: MAIA KENYON   FINDINGS: Multiple overlying leads are present.   CARDIOMEDIASTINAL SILHOUETTE: Cardiomediastinal silhouette is normal in size and configuration. Coronary artery stents are present. Atherosclerotic calcification of the aorta.   LUNGS: No consolidation, pleural effusion or pneumothorax.   ABDOMEN: No remarkable upper abdominal findings.   BONES: Postsurgical changes of cervical spine fusion are again noted.       No acute cardiopulmonary process.   MACRO: None   Signed by: Derrick Pleitez 10/28/2023 12:30 AM Dictation workstation:   [id number]    JUSTUS without exercise    Result Date: 10/22/2023              [place] [address]      Phone [phone] Fax [phone]  Vascular Lab Report        PVR JUSTUS Patient Name:      EBONY BLANC     Reading Physician:  [id number] Angel Chappell MD Study Date:        10/17/2023           Ordering Provider:  [id number] LEONIDAS ETIENNE MRN/PID:           [id number]             Fellow: Accession#:        [id number]         Technologist:       Kamila Gonzalez                                                             RVJASON Date of Birth/Age: 1960 / 63 years Technologist 2: Gender:            M                    Encounter#:         [id number] Admission Status:  Outpatient           Location Performed: [facility]  Diagnosis/ICD: Peripheral vascular disease, unspecified-I73.9  CONCLUSIONS: Right Lower PVR: No evidence of arterial occlusive disease in the right lower extremity at rest. Normal digital perfusion noted. Triphasic flow is noted in the right common femoral artery, right posterior tibial artery and right dorsalis pedis artery. Left Lower PVR: No evidence of arterial  occlusive disease in the left lower extremity at rest. Normal digital perfusion noted. Triphasic flow is noted in the left common femoral artery, left posterior tibial artery and left dorsalis pedis artery.  Imaging & Doppler Findings:  RIGHT Lower PVR                Pressures Ratios Right Posterior Tibial (Ankle) 167 mmHg  1.52 Right Dorsalis Pedis (Ankle)   147 mmHg  1.34 Right Digit (Great Toe)        73 mmHg   0.66   LEFT Lower PVR                Pressures Ratios Left Posterior Tibial (Ankle) 131 mmHg  1.19 Left Dorsalis Pedis (Ankle)   163 mmHg  1.48 Left Digit (Great Toe)        101 mmHg  0.92                      Right     Left Brachial Pressure 110 mmHg 105 mmHg   [id number] Angel Chappell MD Electronically signed by [id number] Angel Chappell MD on 10/19/2023 at 7:24:28 PM  ** Final (Updated) **     Lower extremity venous duplex right    Result Date: 10/18/2023  Interpreted By:  Penny Means, STUDY: [facility] LOWER EXTREMITY VENOUS DUPLEX RIGHT;  10/18/2023 10:19 pm   INDICATION: Signs/Symptoms:leg pain.   COMPARISON: None.   ACCESSION NUMBER(S): [id number]   ORDERING CLINICIAN: AARON PRETTY   TECHNIQUE: Grayscale, color and spectral Doppler sonographic images of the right lower extremity deep venous system. The left common femoral vein was imaged for comparison.   FINDINGS: There is normal compressibility of the right common femoral vein, saphenous femoral junction, femoral vein and popliteal vein. The posterior tibial and peroneal veins demonstrate normal color flow and compressibility. There is normal spontaneous and phasic variation throughout the leg by spectral doppler.   The left common femoral vein is patent.   OTHER FINDINGS: None.       No sonographic evidence of acute DVT in the right lower extremity.       MACRO: None   Signed by: Penny Means 10/18/2023 11:09 PM Dictation workstation:   [id number]    CT lumbar spine wo IV contrast    Result Date: 10/18/2023  Interpreted By:  Judith Douglas, STUDY: CT LUMBAR SPINE  WO IV CONTRAST;  10/18/2023 8:02 pm   INDICATION: Signs/Symptoms:back pain.   COMPARISON: None.   ACCESSION NUMBER(S): [id number]   ORDERING CLINICIAN: AARON PRETTY   TECHNIQUE: Axial CT images of the thoracic spine are obtained. Axial, coronal and sagittal reconstructions are submitted for review.   FINDINGS: Alignment of lumbar spine is unremarkable. Lumbar vertebral bodies demonstrate normal height without acute compression fracture deformities. Posterior elements are intact. Transverse processes are intact without acute fracture. There are moderate to severe discogenic degenerative changes of the lumbar spine at L5-S1 level with significant loss of disc space height with ex vacuo disc phenomenon. There is endplate irregularity with prominent posterior disc osteophyte complex along with facet arthropathy contributing to moderate to severe bilateral lateral recess and neural foraminal stenosis. There is also circumferential broad-based disc osteophyte complex at L4-L5 level extending into subarticular and extraforaminal zones with mild central canal stenosis and moderate bilateral lateral recess and neural foraminal stenosis. Paraspinal soft tissues appear grossly unremarkable.   Paraspinal soft tissues appear grossly unremarkable. There are moderate atherosclerotic calcifications of the abdominal aorta without aneurysmal dilatation. There are few punctate 2-3 mm nonobstructing calculi in bilateral kidneys. There is also an exophytic lesion along the superior pole of right kidney demonstrating increased density measuring approximately 63 Hounsfield units. This measures approximately 1.1 x 0.8 cm. Otherwise rest of the visualized prevertebral soft tissues appear grossly unremarkable.       1. No acute fracture or traumatic malalignment of the lumbar spine. 2. Moderate to severe discogenic degenerative changes of the lumbar spine, more pronounced at L4-L5 and L5-S1 levels with at least moderate degree of  bilateral lateral recess and neural foraminal stenosis as described above. 3. Incidental note of nonobstructing bilateral renal calculi. 4. Incidental note of a 1.1 x 0.8 cm exophytic lesion along the superior pole of right kidney measuring 63 Hounsfield units. This is better seen on today's examination compared to prior CT abdomen pelvis dated 05/07/2023. This could represent a proteinaceous cyst, however recommend further evaluation with a dedicated nonemergent CT renal mass protocol.   MACRO: Critical Finding:  See findings. Notification was initiated on 10/18/2023 at 8:45 pm by  Judith Douglas.  (**-YCF-**) Instructions:   Signed by: Judith Douglas 10/18/2023 8:45 PM Dictation workstation:   [id number]    CT hip right wo IV contrast    Result Date: 10/18/2023  Interpreted By:  Judith Douglas, STUDY: CT HIP RIGHT WO IV CONTRAST;  10/18/2023 8:02 pm   INDICATION: Signs/Symptoms:hip pain.   COMPARISON: None.   ACCESSION NUMBER(S): [id number]   ORDERING CLINICIAN: AARON PRETTY   TECHNIQUE: CT imaging of the  right lower extremity was obtained  without administration of intravenous contrast medium. Coronal and sagittal reformatted images were performed. 3D reformatted images were created and reviewed.   FINDINGS: OSSEOUS STRUCTURES: No acute fracture or dislocation. Right hip joint is intact. There are mild degenerative changes in the right hip joint with small marginal osteophytes and joint space narrowing. There is a well corticated ossific fragment in the proximal hamstring origin, likely favored to represent enthesopathy versus sequela of remote trauma. Partially visualized pubic symphysis demonstrate moderate degenerative changes with marginal osteophytes. Partially included sacrum appears grossly unremarkable. There are moderate to severe discogenic degenerative changes of the lumbar spine at L5-S1 level which are better described on subsequently performed CT lumbar spine.   SOFT TISSUES: There is mild  reticular subcutaneous soft tissue edema along the lateral aspect of the proximal thigh. This is nonspecific. Moderate atherosclerotic calcifications are noted in the visualized femoral vasculature. There are few small foci of air in the subcutaneous soft tissues along the posterior gluteal region (best seen on axial image 67/105). This could be related to site of injection. Otherwise associated musculoskeletal soft tissues appear grossly unremarkable. Partially included intrapelvic soft tissues are unremarkable.       1. Mild right hip arthrosis. 2. No acute fracture or dislocation. 3. Small foci of air in the subcutaneous soft tissues of posterior right gluteal region is likely favored to be related to injection site. Recommend clinical correlation.       MACRO: None   Signed by: Judith Douglas 10/18/2023 8:36 PM Dictation workstation:   [id number]          Assessment/Plan     Benign prostatic hyperplasia without lower urinary tract symptoms  Assessment & Plan  Holding flomax in setting pre-syncope/syncopal episodes   Monitor closely for retention symptoms    Anxiety and depression  Assessment & Plan  Continued on home medications once fully confirmed as patient is unsure of doses and reports recent modifications to several    Insulin dependent type 2 diabetes mellitus (CMS/[facility])  Assessment & Plan  Hgb A1c = pending  ISS - Mild AC/HS  Continued on home medications once fully confirmed as patient is unsure of doses and reports recent modifications to several  Diabetic Diet    Hyperlipidemia  Assessment & Plan  Continued on home medications once fully confirmed as patient is unsure of doses and reports recent modifications to several    GERD (gastroesophageal reflux disease)  Assessment & Plan  Continued ppi    CAD in native artery  Assessment & Plan  No complaints at this time and unremarkable EKG  Telemetry Monitoring  Continued outpatient follow up   Continued on home medications once fully confirmed as  patient is unsure of doses and reports recent modifications to several     Benign essential hypertension  Assessment & Plan  Holding home antihypertensives in setting of Syncope and softer pressures    * Syncope, unspecified syncope type  Assessment & Plan  Suspect vasovagal vs orthostatic   Pressures noted to be softer since presentation at 90s-100s/50s-70s  Holding antihypertensives  Telemetry Monitoring  Orthostatic Vital Signs pending completion  Echocardiogram = pending  Carotid Duplex = pending   Will order a CT Head without Contrast to rule out for acute processes but if persistent symptoms them may require MRI Brain/MRA Head & Neck             Ady Demarco, DO

## 2023-10-30 ENCOUNTER — OFFICE VISIT (OUTPATIENT)
Dept: PAIN MEDICINE | Facility: HOSPITAL | Age: 63
End: 2023-10-30
Payer: COMMERCIAL

## 2023-10-30 VITALS
SYSTOLIC BLOOD PRESSURE: 105 MMHG | WEIGHT: 164.4 LBS | HEIGHT: 70 IN | HEART RATE: 75 BPM | RESPIRATION RATE: 18 BRPM | DIASTOLIC BLOOD PRESSURE: 73 MMHG | BODY MASS INDEX: 23.54 KG/M2

## 2023-10-30 DIAGNOSIS — M47.816 LUMBAR SPONDYLOSIS: ICD-10-CM

## 2023-10-30 DIAGNOSIS — M54.16 LUMBAR RADICULOPATHY: ICD-10-CM

## 2023-10-30 DIAGNOSIS — M54.16 LUMBAR NEURITIS: Primary | ICD-10-CM

## 2023-10-30 PROCEDURE — 3046F HEMOGLOBIN A1C LEVEL >9.0%: CPT | Performed by: PHYSICAL MEDICINE & REHABILITATION

## 2023-10-30 PROCEDURE — 3074F SYST BP LT 130 MM HG: CPT | Performed by: PHYSICAL MEDICINE & REHABILITATION

## 2023-10-30 PROCEDURE — 4010F ACE/ARB THERAPY RXD/TAKEN: CPT | Performed by: PHYSICAL MEDICINE & REHABILITATION

## 2023-10-30 PROCEDURE — 1036F TOBACCO NON-USER: CPT | Performed by: PHYSICAL MEDICINE & REHABILITATION

## 2023-10-30 PROCEDURE — 99204 OFFICE O/P NEW MOD 45 MIN: CPT | Performed by: PHYSICAL MEDICINE & REHABILITATION

## 2023-10-30 PROCEDURE — 3078F DIAST BP <80 MM HG: CPT | Performed by: PHYSICAL MEDICINE & REHABILITATION

## 2023-10-30 PROCEDURE — 3048F LDL-C <100 MG/DL: CPT | Performed by: PHYSICAL MEDICINE & REHABILITATION

## 2023-10-30 PROCEDURE — 99214 OFFICE O/P EST MOD 30 MIN: CPT | Performed by: PHYSICAL MEDICINE & REHABILITATION

## 2023-10-30 ASSESSMENT — PAIN SCALES - GENERAL: PAINLEVEL: 7

## 2023-10-30 ASSESSMENT — PATIENT HEALTH QUESTIONNAIRE - PHQ9
2. FEELING DOWN, DEPRESSED OR HOPELESS: NOT AT ALL
1. LITTLE INTEREST OR PLEASURE IN DOING THINGS: NOT AT ALL
SUM OF ALL RESPONSES TO PHQ9 QUESTIONS 1 AND 2: 0

## 2023-10-30 ASSESSMENT — ENCOUNTER SYMPTOMS
LOSS OF SENSATION IN FEET: 0
DEPRESSION: 0
OCCASIONAL FEELINGS OF UNSTEADINESS: 1

## 2023-10-30 NOTE — PROGRESS NOTES
New pt c/o right side pain, starting right hip, radiates up to the mid back and down leg to toes, numbness right toes  Seen in ER this past Fri for fainting-was told from dehydration.    Seen at pain clinic in [place] 15 yrs ago for neck pain, pinched nerve.  Referred by Pain clinic in [place]     Pain score 7/10    Meds: Tylenol prn-does not help, has taken tramadol-has not been taking, ran out, states did not help    No PT   OA 10/30/23  Subjective   Patient ID: Manish Lance is a 63 y.o. male who presents for Hip Pain.  HPI    63-year-old male with lower back pain with right lower extremity radicular symptoms to the top of his right foot.  Patient has had this pain for about a month or 2 without inciting event.  He is having severe pain and has been unable to do any type of exercise currently.  He does have a referral to physical therapy which he will be making but he is having hard time doing any type of stretching.  Denies any lower extremity weakness, denies any bowel or bladder changes.  He was seen by one of our orthopedic spine surgeons who referred him to us to consider doing some epidural steroid injections.  He is currently taking pregabalin managed by his primary care physician which was recently increased (this was for another issue) which may be has helped some.  He has also been given Medrol Dosepak which has helped temporarily.  Pain is worse with activity.  Also on duloxetine                    Monitoring and compliance:    ORT:    PDUQ:    Office Agreement: 10/30/23      Review of Systems   All other systems reviewed and are negative.       Current Outpatient Medications   Medication Instructions    acetaminophen (TYLENOL) 325 mg, oral, Every 6 hours PRN, PATIENT MAY TAKE ONE TO TWO TABS    albuterol (ProAir HFA) 90 mcg/actuation inhaler 1 puff, inhalation, Every 4 hours PRN    allopurinol (ZYLOPRIM) 300 mg, oral, Daily    amLODIPine (Norvasc) 5 mg tablet 1 tablet, oral, Daily    aspirin  81 mg, oral, CLARIFY DIRECTIONS    atorvastatin (LIPITOR) 40 mg, oral, Daily    buPROPion SR (WELLBUTRIN SR) 150 mg, oral, Daily    cholecalciferol (Vitamin D-3) 50 MCG (2000 UT) tablet 1 tablet, oral, Daily, CLARIFY DIRECTIONS    docusate sodium (Colace) 100 mg capsule 1 capsule, oral, 2 times daily    DULoxetine (CYMBALTA) 60 mg, oral, Daily    empagliflozin (JARDIANCE) 25 mg, oral, CLARIFY DIRECTIONS    ezetimibe (Zetia) 10 mg tablet 1 tablet, oral, Daily    ferrous sulfate 325 (65 Fe) MG tablet 65 mg of iron, oral, 3 times daily    finasteride (PROSCAR) 5 mg, oral, Daily, Do not crush, chew, or split.    fluticasone (Flonase) 50 mcg/actuation nasal spray 2 sprays, nasal, Nightly, CLARIFY DIRECTIONS    insulin glargine (LANTUS U-100 INSULIN) 26 Units, 2 times daily PRN    ketorolac (TORADOL) 10 mg, oral, Every 6 hours PRN    lisinopril 20 mg, oral, Daily    melatonin 3 mg capsule oral, 1-2 TABLETS NIGHTLY IF NEEDED    metFORMIN (Glucophage) 1,000 mg tablet 1 tablet, oral, Every 12 hours    methocarbamol (ROBAXIN) 500 mg, oral, 2 times daily    metoprolol tartrate (Lopressor) 25 mg tablet 1 tablet, oral, 2 times daily    nitroglycerin (NITROSTAT) 0.4 mg, sublingual, Every 5 min PRN, PLACE 1 TABLET UNDER THE TONGUE EVERY 5 MINUTES FOR UP TO 3 DOSES AS NEEDED FOR CHEST PAIN.CALL 911 IF PAIN PERSISTS.    omeprazole (PriLOSEC) 20 mg DR capsule 1 capsule, oral, 2 times daily    oxyCODONE-acetaminophen (Percocet) 5-325 mg tablet 1-2 tablets, oral, Every 6 hours PRN    pregabalin (Lyrica) 150 mg capsule 1 capsule, oral, Every morning, AND 2 CAPSULE IN THE EVENING    tamsulosin (Flomax) 0.4 mg 24 hr capsule 1 capsule, oral, Daily    ticagrelor (BRILINTA) 90 mg, oral, 2 times daily    traMADol (Ultram) 50 mg tablet 1 tablet, oral, Every 6 hours PRN        Past Medical History:   Diagnosis Date    Diabetes mellitus (CMS/HCC)     Personal history of other diseases of the circulatory system 10/16/2020    History of heart  disease    Personal history of other diseases of the circulatory system 10/16/2020    History of hypertension        Past Surgical History:   Procedure Laterality Date    BACK SURGERY  07/14/2017    Back Surgery    CARPAL TUNNEL RELEASE  07/14/2017    Neuroplasty Median Nerve At Carpal Tunnel    CORONARY ANGIOPLASTY  07/14/2017    PTCA    GASTRIC BYPASS  07/14/2017    Gastric Surgery For Morbid Obesity Gastric Bypass        Family History   Problem Relation Name Age of Onset    Coronary artery disease Mother      Coronary artery disease Father      Coronary artery disease Brother          No Known Allergies     Objective     Vitals:    10/30/23 1325   BP: 105/73   Pulse: 75   Resp: 18        Physical Exam    GENERAL EXAM  Vital Signs: Vital signs to include heart rate, respiration rate, blood pressure, and temperature were reviewed.  General Appearance:  Awake, alert, healthy appearing, well developed, No acute distress.  Head: Normocephalic without evidence of head injury.  Neck: The appearance of the neck was normal without swelling with a midline trachea.  Eyes: The eyelids and eyebrows exhibited no abnormalities.  Pupils were not pin-point.  Sclera was without icterus.  Lungs: Respiration rhythm and depth was normal.  Respiratory movements were normal without labored breathing.  Cardiovascular: No peripheral edema was present.    Neurological: Patient was oriented to time, place, and person.  Speech was normal.  Balance, gait, and stance were unremarkable.    Psychiatric: Appearance was normal with appropriate dress.  Mood was euthymic and affect was normal.  Skin: Affected regions were without ecchymosis or skin lesions.    Back (MSK/neuro/skin):  Full active and passive range of motion in all planes  Strength 5 out of 5 bilateral lower extremities  Sensation to light-touch grossly intact bilateral lower extremities  Deep tendon reflexes equal bilateral lower extremities  No edema/effusion/erythema  Skin: no  skin lesions or scars  B SLR (-)  B sacral spring test (-),   B facet load (-)  B SIJ tenderness (-)   B LEONEL (-)  Full flexion/extension  No TTP, no muscle spasm over lumbar paraspinals    Physical exam as above except:  Tenderness palpation of right lumbar paraspinals.  Positive straight leg raise on the right      Assessment/Plan   Problem List Items Addressed This Visit             ICD-10-CM    Lumbar neuritis - Primary M54.16     63-year-old male with lower back pain with right lower extremity radicular symptoms.  I did personally review patient's CT scan of his lumbar spine showing degenerative changes especially at L5-S1 and L4-5 with disc bulging and likely some neuroforaminal stenosis especially at that level.  His symptoms are consistent with a right L5 neuritis.  He was referred by one of our spine surgeons for consideration of an injection.  I think that this is very reasonable given that he is very acutely flared up and he has not been able to tolerate any type of exercise.  I did discuss that it is important that he does physical therapy in the long run but I think to help him be able to tolerate the therapy it is medically necessary to do an epidural steroid injection.  Otherwise her plan for today:  - L5-S1 intralaminar epidural steroid injection.  Patient is on Brilinta he will need permission to hold this medication for 7 days prior to procedure.  He can continue with aspirin for this procedure.  - Continue with duloxetine and pregabalin per his PCP.  - Patient will follow-up with my nurse practitioner a few weeks after the injection.  His if he does not have durable long-term relief from initial epidural obtain an MRI of his lumbar spine.         Lumbar spondylosis M47.816     Other Visit Diagnoses         Codes    Lumbar radiculopathy     M54.16

## 2023-11-16 ENCOUNTER — APPOINTMENT (OUTPATIENT)
Dept: PHYSICAL THERAPY | Facility: HOSPITAL | Age: 63
End: 2023-11-16
Payer: COMMERCIAL

## 2023-11-21 ENCOUNTER — APPOINTMENT (OUTPATIENT)
Dept: CARDIOLOGY | Facility: CLINIC | Age: 63
End: 2023-11-21
Payer: COMMERCIAL

## 2023-11-24 ENCOUNTER — APPOINTMENT (OUTPATIENT)
Dept: PAIN MEDICINE | Facility: HOSPITAL | Age: 63
End: 2023-11-24
Payer: COMMERCIAL

## 2023-12-18 ENCOUNTER — OFFICE VISIT (OUTPATIENT)
Dept: CARDIOLOGY | Facility: CLINIC | Age: 63
End: 2023-12-18
Payer: COMMERCIAL

## 2023-12-18 VITALS
SYSTOLIC BLOOD PRESSURE: 116 MMHG | WEIGHT: 160 LBS | BODY MASS INDEX: 22.9 KG/M2 | HEIGHT: 70 IN | HEART RATE: 70 BPM | DIASTOLIC BLOOD PRESSURE: 68 MMHG

## 2023-12-18 DIAGNOSIS — I25.10 CAD IN NATIVE ARTERY: Primary | ICD-10-CM

## 2023-12-18 PROCEDURE — 4010F ACE/ARB THERAPY RXD/TAKEN: CPT | Performed by: INTERNAL MEDICINE

## 2023-12-18 PROCEDURE — 3078F DIAST BP <80 MM HG: CPT | Performed by: INTERNAL MEDICINE

## 2023-12-18 PROCEDURE — 1036F TOBACCO NON-USER: CPT | Performed by: INTERNAL MEDICINE

## 2023-12-18 PROCEDURE — 3074F SYST BP LT 130 MM HG: CPT | Performed by: INTERNAL MEDICINE

## 2023-12-18 PROCEDURE — 3046F HEMOGLOBIN A1C LEVEL >9.0%: CPT | Performed by: INTERNAL MEDICINE

## 2023-12-18 PROCEDURE — 93000 ELECTROCARDIOGRAM COMPLETE: CPT | Performed by: INTERNAL MEDICINE

## 2023-12-18 PROCEDURE — 99213 OFFICE O/P EST LOW 20 MIN: CPT | Performed by: INTERNAL MEDICINE

## 2023-12-18 PROCEDURE — 3048F LDL-C <100 MG/DL: CPT | Performed by: INTERNAL MEDICINE

## 2023-12-18 RX ORDER — TRAZODONE HYDROCHLORIDE 50 MG/1
TABLET ORAL
COMMUNITY

## 2023-12-18 RX ORDER — GLIPIZIDE 5 MG/1
1 TABLET ORAL DAILY
COMMUNITY

## 2023-12-18 RX ORDER — COLCHICINE 0.6 MG/1
TABLET ORAL
COMMUNITY

## 2023-12-18 ASSESSMENT — ENCOUNTER SYMPTOMS
OCCASIONAL FEELINGS OF UNSTEADINESS: 1
LOSS OF SENSATION IN FEET: 1
DEPRESSION: 0

## 2023-12-18 NOTE — LETTER
December 18, 2023     Angel Reinoso MD  [id number] [address] [facility] [address] [facility] [address]    Patient: Manish Lance   YOB: 1960   Date of Visit: 12/18/2023       Dear Dr. Angel Reinoso MD:    Thank you for referring Manish Lance to me for evaluation. Below are my notes for this consultation.  If you have questions, please do not hesitate to call me. I look forward to following your patient along with you.       Sincerely,     Alexis Castillo MD      CC: No Recipients  ______________________________________________________________________________________    Counseling:  The patient was counseled regarding diagnostic results, instructions for management, risk factor reductions, prognosis, patient and family education, impressions, risks and benefits of treatment options and importance of compliance with treatment.      Chief Complaint:   The patient presents today for annual followup of CAD, HTN and dyslipidemia s/p echocardiogram.     History Of Present Illness:    Manish Lance is a 63 year old male patient who presents today for annual followup of CAD, HTN and dyslipidemia s/p echocardiogram. His PMH is significant for CAD s/p PCI of the Cx and RCA and then repeat laser PCI of Cx, HTN, DM type 2, hyperlipidemia, BHARTI and palpitations. The patient wishes to undergo steroid injections and requires cardiac clearance. Echocardiogram performed 10/28/2023 demonstrated an EF of 60%, moderate aortic stenosis and mild aortic regurgitation. Over the past year, the patient states that he has done well from a cardiac standpoint. He denies any CP, chest discomfort or SOB. He states that the steroid injections will be performed for relief of a bulging disc in his lumbar spine. EKG today is stable with no acute changes. The patient is compliant with his prescribed medications.     Last Recorded Vitals:  Vitals:    12/18/23 1608   BP: 116/68   BP Location: Left arm   Pulse: 70   Weight: 72.6 kg (160 lb)   Height:  "1.778 m (5' 10\")       Past Surgical History:  He has a past surgical history that includes Coronary angioplasty (07/14/2017); Gastric bypass (07/14/2017); Back surgery (07/14/2017); and Carpal tunnel release (07/14/2017).      Social History:  He reports that he quit smoking about 28 years ago. His smoking use included cigarettes. He has quit using smokeless tobacco. He reports that he does not currently use alcohol. He reports that he does not use drugs.    Family History:  Family History   Problem Relation Name Age of Onset   • Coronary artery disease Mother     • Coronary artery disease Father     • Coronary artery disease Brother          Allergies:  Patient has no known allergies.    Outpatient Medications:  Current Outpatient Medications   Medication Instructions   • acetaminophen (TYLENOL) 325 mg, oral, Every 6 hours PRN, PATIENT MAY TAKE ONE TO TWO TABS   • albuterol (ProAir HFA) 90 mcg/actuation inhaler 1 puff, inhalation, Every 4 hours PRN   • allopurinol (ZYLOPRIM) 300 mg, oral, Daily   • amLODIPine (Norvasc) 5 mg tablet 1 tablet, oral, Daily   • aspirin 81 mg, oral, CLARIFY DIRECTIONS   • atorvastatin (LIPITOR) 40 mg, oral, Daily   • buPROPion SR (WELLBUTRIN SR) 150 mg, oral, Daily   • cholecalciferol (Vitamin D-3) 50 MCG (2000 UT) tablet 1 tablet, oral, Daily, CLARIFY DIRECTIONS   • colchicine 0.6 mg tablet oral   • docusate sodium (Colace) 100 mg capsule 1 capsule, oral, 2 times daily   • DULoxetine (CYMBALTA) 60 mg, oral, Daily   • empagliflozin (JARDIANCE) 25 mg, oral, CLARIFY DIRECTIONS   • ezetimibe (Zetia) 10 mg tablet 1 tablet, oral, Daily   • ferrous sulfate 325 (65 Fe) MG tablet 65 mg of iron, oral, 3 times daily   • finasteride (PROSCAR) 5 mg, oral, Daily, Do not crush, chew, or split.   • fluticasone (Flonase) 50 mcg/actuation nasal spray 2 sprays, nasal, Nightly, CLARIFY DIRECTIONS   • glipiZIDE (Glucotrol) 5 mg tablet 1 tablet, oral, Daily   • insulin glargine (LANTUS U-100 INSULIN) 26 " Units, 2 times daily PRN   • lisinopril 20 mg, oral, Daily   • melatonin 3 mg capsule oral, 1-2 TABLETS NIGHTLY IF NEEDED   • metFORMIN (Glucophage) 1,000 mg tablet 1 tablet, oral, Every 12 hours   • methocarbamol (ROBAXIN) 500 mg, oral, 2 times daily   • metoprolol tartrate (Lopressor) 25 mg tablet 1 tablet, oral, 2 times daily   • nitroglycerin (NITROSTAT) 0.4 mg, sublingual, Every 5 min PRN, PLACE 1 TABLET UNDER THE TONGUE EVERY 5 MINUTES FOR UP TO 3 DOSES AS NEEDED FOR CHEST PAIN.CALL 911 IF PAIN PERSISTS.   • omeprazole (PriLOSEC) 20 mg DR capsule 1 capsule, oral, 2 times daily   • pregabalin (Lyrica) 150 mg capsule 1 capsule, oral, Every morning, AND 2 CAPSULE IN THE EVENING   • tamsulosin (Flomax) 0.4 mg 24 hr capsule 1 capsule, oral, Daily   • ticagrelor (BRILINTA) 90 mg, oral, 2 times daily   • traMADol (Ultram) 50 mg tablet 1 tablet, oral, Every 6 hours PRN   • traZODone (Desyrel) 50 mg tablet oral     Review of Systems   All other systems reviewed and are negative.     Physical Exam:  Constitutional:       Appearance: Healthy appearance. Not in distress.   Neck:      Vascular: No JVR. JVD normal.   Pulmonary:      Effort: Pulmonary effort is normal.      Breath sounds: Normal breath sounds. No wheezing. No rhonchi. No rales.   Chest:      Chest wall: Not tender to palpatation.   Cardiovascular:      PMI at left midclavicular line. Normal rate. Regular rhythm. Normal S1. Normal S2.       Murmurs: There is no murmur.      No gallop.  No click. No rub.   Pulses:     Intact distal pulses.   Edema:     Peripheral edema absent.   Abdominal:      General: Bowel sounds are normal.      Palpations: Abdomen is soft.      Tenderness: There is no abdominal tenderness.   Musculoskeletal: Normal range of motion.         General: No tenderness. Skin:     General: Skin is warm and dry.   Neurological:      General: No focal deficit present.      Mental Status: Alert and oriented to person, place and time.          Last  Labs:  CBC -  Lab Results   Component Value Date    WBC 6.5 10/28/2023    HGB 12.2 (L) 10/28/2023    HCT 37.5 (L) 10/28/2023    MCV 89 10/28/2023     (L) 10/28/2023       CMP -  Lab Results   Component Value Date    CALCIUM 8.5 (L) 10/28/2023    PHOS 4.5 10/28/2023    PROT 5.1 (L) 10/27/2023    ALBUMIN 3.4 10/28/2023    AST 15 10/27/2023    ALT 33 10/27/2023    ALKPHOS 93 10/27/2023    BILITOT 0.3 10/27/2023       LIPID PANEL -   Lab Results   Component Value Date    CHOL 86 10/28/2023    TRIG 76 10/28/2023    HDL 31.0 10/28/2023    CHHDL 2.8 10/28/2023    LDLF 41 05/26/2022    VLDL 15 10/28/2023    NHDL 55 10/28/2023       RENAL FUNCTION PANEL -   Lab Results   Component Value Date    GLUCOSE 169 (H) 10/28/2023     10/28/2023    K 4.2 10/28/2023     (H) 10/28/2023    CO2 25 10/28/2023    ANIONGAP 9 (L) 10/28/2023    BUN 32 (H) 10/28/2023    CREATININE 1.19 10/28/2023    GFRMALE 52 (A) 05/07/2023    CALCIUM 8.5 (L) 10/28/2023    PHOS 4.5 10/28/2023    ALBUMIN 3.4 10/28/2023        Lab Results   Component Value Date    BNP 16 10/27/2023    HGBA1C 10.3 (H) 10/28/2023       Last Cardiology Tests:  10/28/2023 - TTE  1. Left ventricular systolic function is normal with a 60% estimated ejection fraction.  2. Moderate aortic valve stenosis.  3. Mild aortic valve regurgitation.    07/20/2022 - Exercise Stress Echo  1. No clinical, echocardiographic or electrocardiographic evidence for ischemia at maximal workload.  2. No ECG changes from baseline.  3. The adequate level of stress was achieved.     12/13/2021 - TTE  1. The left ventricular systolic function is normal with a 60-65% estimated ejection fraction.  2. There is moderate concentric left ventricular hypertrophy.  3. Mild aortic valve stenosis.  4. There is mild aortic valve regurgitation.     07/19/2021 to 08/17/2021 - Event Monitoring  1. Baseline transmission showing sinus rhythm with a heart rate of 67 bpm.   2. PAC burden of less than 1%.  3.  PVC burden of 1%.  4. 50 symptomatic events with complaints include chest pain, shortness of breath, or other correlating with sinus rhythm or sinus tachycardia.      06/10/2021 - Cardiac Catheterization (LH)  1. Single vessel coronary artery disease without proximal left anterior descending involvement.  2. Culprit vessel(s): circumflex.  3. Successful PCI of CX.     03/08/2021 - Vascular Lab Renal Artery U/S  1. Renal Artery Duplex: No evidence of an abdominal aortic aneurysm.  2. Right Renal Artery: Right renal arteries demonstrate no evidence of hemodynamically significant stenosis. The right renal vein is widely patent.  3. Left Renal Artery: Left renal arteries demonstrate no evidence of hemodynamically significant stenosis. The left renal vein is widely patent.  4. Additional Findings: Technically difficult due to body habitus and overlying bowel gas.     05/22/2020 - Exercise Stress Echo  1. The resting ejection fraction was estimated at 55 to 60% with a peak exercise ejection fraction estimated at 55 to 60%.  2. Normal global left ventricular systolic function.  3. Stage: Impost: Basal and mid inferior septum and basal and mid inferolateral wall are abnormal.  4. Hypertensive BP response may reduce specificity of the examination.  5. At peak, there are stress-induced regional wall motion abnormalities.  6. No electrocardiographic evidence for ischemia at maximal workload.  7. The adequate level of stress was achieved.     02/18/2020 - Cardiac Catheterization (LH)  1. Single vessel coronary artery disease without proximal left anterior descending involvement.  2. Successful PCI of Cx.     10/03/2019 - Cardiac Catheterization (LH)  Single vessel coronary artery disease without proximal left anterior descending involvement.     05/21/2019 - Echocardiogram   1. Normal left ventricular size and regional systolic function with an ejection fraction of 55%.  2. Normal right ventricular size and systolic  function.  3. Trace mitral regurgitation.  4. Mild aortic regurgitation.   5. Mild aortic stenosis.  6. Trace tricuspid regurgitation.     04/24/2019 - Cardiac Catheterization (LH)  1. Double vessel coronary artery disease without proximal left anterior descending involvement.  2. Successful PTCA/stent of Cx.     03/25/2019 - Echocardiogram   1. Normal left ventricular regional systolic function with an ejection fraction of 55%.  2. Normal right ventricular size and systolic function.  3. Trace mitral regurgitation.  4. Mild aortic regurgitation.   5. Mild aortic stenosis.  6. Trace tricuspid regurgitation.     08/31/2018 - Stress Test  Normal myocardial perfusion scan with normal LV function after exercise stress.     06/06/2018 - Exercise Stress Echocardiogram   1. Abnormal graded exercise treadmill stress test secondary to EKG changes without chest pain.  2. Exercise capacity: Average exercise capacity.  3. Fowler treadmill score is: No applicable for this patient.     Lab review: I have personally reviewed the laboratory result(s).  Diagnostic review: I have personally reviewed the result(s) of the Echocardiogram.    Assessment/Plan  1) CAD s/p Repeat PCI of Cx Feb 2020 and Repeat PCI Cx June 2021, Moderate Aortic Stenosis with Mild Regurgitation  On DAPT - ASA 81 mg daily, Brilinta 90 mg daily  On metoprolol tartrate 25 mg BID, atorvastatin 40 mg daily, amlodipine 5 mg daily, Zetia 10 mg daily, lisinopril 20 mg daily  Needs aggressive control of DM per PCP  Needs aggressive control of BP   TTE 10/28/2023 with LVEF 60%, moderate AS, mild AR   Doing well - denies CP, chest discomfort or SOB  Steroid injections are being planned for management of a bulging disc in lumbar spine  Advised that it would be preferred to remain on Brilinta for steroid injection, but if it needs to be held to remain on ASA.  Followup with Yasmin Gore NP, in 6 months    2) HTN  Stable  Continue lisinopril 20 mg daily, amlodipine 5 mg  daily, lisinopril 20 mg daily   Imdur previously discontinued  Renal artery duplex negative TALIA March 2021  Followup with Yasmin Gore NP, in 6 months     3.)Hyperlipidemia  Goal LDL <70  Continue atorvastatin 80 mg daily and Zetia 10 mg daily  Low fat/low cholesterol diet  Lipid panel 10/28/2023 with LDL of 40; at goal  Followup with Yasmin Gore NP, in 6 months     4) Weight Loss  CT chest and abdomen negative for malignancy  Previously referred to GI and Internal Medicine      Scribe Attestation  By signing my name below, I, Angelica Ordonez   attest that this documentation has been prepared under the direction and in the presence of Alexis Castillo MD.

## 2023-12-18 NOTE — PROGRESS NOTES
"Counseling:  The patient was counseled regarding diagnostic results, instructions for management, risk factor reductions, prognosis, patient and family education, impressions, risks and benefits of treatment options and importance of compliance with treatment.      Chief Complaint:   The patient presents today for annual followup of CAD, HTN and dyslipidemia s/p echocardiogram.     History Of Present Illness:    Manish Lance is a 63 year old male patient who presents today for annual followup of CAD, HTN and dyslipidemia s/p echocardiogram. His PMH is significant for CAD s/p PCI of the Cx and RCA and then repeat laser PCI of Cx, HTN, DM type 2, hyperlipidemia, BHARTI and palpitations. The patient wishes to undergo steroid injections and requires cardiac clearance. Echocardiogram performed 10/28/2023 demonstrated an EF of 60%, moderate aortic stenosis and mild aortic regurgitation. Over the past year, the patient states that he has done well from a cardiac standpoint. He denies any CP, chest discomfort or SOB. He states that the steroid injections will be performed for relief of a bulging disc in his lumbar spine. EKG today is stable with no acute changes. The patient is compliant with his prescribed medications.     Last Recorded Vitals:  Vitals:    12/18/23 1608   BP: 116/68   BP Location: Left arm   Pulse: 70   Weight: 72.6 kg (160 lb)   Height: 1.778 m (5' 10\")       Past Surgical History:  He has a past surgical history that includes Coronary angioplasty (07/14/2017); Gastric bypass (07/14/2017); Back surgery (07/14/2017); and Carpal tunnel release (07/14/2017).      Social History:  He reports that he quit smoking about 28 years ago. His smoking use included cigarettes. He has quit using smokeless tobacco. He reports that he does not currently use alcohol. He reports that he does not use drugs.    Family History:  Family History   Problem Relation Name Age of Onset    Coronary artery disease Mother      " Coronary artery disease Father      Coronary artery disease Brother          Allergies:  Patient has no known allergies.    Outpatient Medications:  Current Outpatient Medications   Medication Instructions    acetaminophen (TYLENOL) 325 mg, oral, Every 6 hours PRN, PATIENT MAY TAKE ONE TO TWO TABS    albuterol (ProAir HFA) 90 mcg/actuation inhaler 1 puff, inhalation, Every 4 hours PRN    allopurinol (ZYLOPRIM) 300 mg, oral, Daily    amLODIPine (Norvasc) 5 mg tablet 1 tablet, oral, Daily    aspirin 81 mg, oral, CLARIFY DIRECTIONS    atorvastatin (LIPITOR) 40 mg, oral, Daily    buPROPion SR (WELLBUTRIN SR) 150 mg, oral, Daily    cholecalciferol (Vitamin D-3) 50 MCG (2000 UT) tablet 1 tablet, oral, Daily, CLARIFY DIRECTIONS    colchicine 0.6 mg tablet oral    docusate sodium (Colace) 100 mg capsule 1 capsule, oral, 2 times daily    DULoxetine (CYMBALTA) 60 mg, oral, Daily    empagliflozin (JARDIANCE) 25 mg, oral, CLARIFY DIRECTIONS    ezetimibe (Zetia) 10 mg tablet 1 tablet, oral, Daily    ferrous sulfate 325 (65 Fe) MG tablet 65 mg of iron, oral, 3 times daily    finasteride (PROSCAR) 5 mg, oral, Daily, Do not crush, chew, or split.    fluticasone (Flonase) 50 mcg/actuation nasal spray 2 sprays, nasal, Nightly, CLARIFY DIRECTIONS    glipiZIDE (Glucotrol) 5 mg tablet 1 tablet, oral, Daily    insulin glargine (LANTUS U-100 INSULIN) 26 Units, 2 times daily PRN    lisinopril 20 mg, oral, Daily    melatonin 3 mg capsule oral, 1-2 TABLETS NIGHTLY IF NEEDED    metFORMIN (Glucophage) 1,000 mg tablet 1 tablet, oral, Every 12 hours    methocarbamol (ROBAXIN) 500 mg, oral, 2 times daily    metoprolol tartrate (Lopressor) 25 mg tablet 1 tablet, oral, 2 times daily    nitroglycerin (NITROSTAT) 0.4 mg, sublingual, Every 5 min PRN, PLACE 1 TABLET UNDER THE TONGUE EVERY 5 MINUTES FOR UP TO 3 DOSES AS NEEDED FOR CHEST PAIN.CALL 911 IF PAIN PERSISTS.    omeprazole (PriLOSEC) 20 mg DR capsule 1 capsule, oral, 2 times daily    pregabalin  (Lyrica) 150 mg capsule 1 capsule, oral, Every morning, AND 2 CAPSULE IN THE EVENING    tamsulosin (Flomax) 0.4 mg 24 hr capsule 1 capsule, oral, Daily    ticagrelor (BRILINTA) 90 mg, oral, 2 times daily    traMADol (Ultram) 50 mg tablet 1 tablet, oral, Every 6 hours PRN    traZODone (Desyrel) 50 mg tablet oral     Review of Systems   All other systems reviewed and are negative.     Physical Exam:  Constitutional:       Appearance: Healthy appearance. Not in distress.   Neck:      Vascular: No JVR. JVD normal.   Pulmonary:      Effort: Pulmonary effort is normal.      Breath sounds: Normal breath sounds. No wheezing. No rhonchi. No rales.   Chest:      Chest wall: Not tender to palpatation.   Cardiovascular:      PMI at left midclavicular line. Normal rate. Regular rhythm. Normal S1. Normal S2.       Murmurs: There is no murmur.      No gallop.  No click. No rub.   Pulses:     Intact distal pulses.   Edema:     Peripheral edema absent.   Abdominal:      General: Bowel sounds are normal.      Palpations: Abdomen is soft.      Tenderness: There is no abdominal tenderness.   Musculoskeletal: Normal range of motion.         General: No tenderness. Skin:     General: Skin is warm and dry.   Neurological:      General: No focal deficit present.      Mental Status: Alert and oriented to person, place and time.          Last Labs:  CBC -  Lab Results   Component Value Date    WBC 6.5 10/28/2023    HGB 12.2 (L) 10/28/2023    HCT 37.5 (L) 10/28/2023    MCV 89 10/28/2023     (L) 10/28/2023       CMP -  Lab Results   Component Value Date    CALCIUM 8.5 (L) 10/28/2023    PHOS 4.5 10/28/2023    PROT 5.1 (L) 10/27/2023    ALBUMIN 3.4 10/28/2023    AST 15 10/27/2023    ALT 33 10/27/2023    ALKPHOS 93 10/27/2023    BILITOT 0.3 10/27/2023       LIPID PANEL -   Lab Results   Component Value Date    CHOL 86 10/28/2023    TRIG 76 10/28/2023    HDL 31.0 10/28/2023    CHHDL 2.8 10/28/2023    LDLF 41 05/26/2022    VLDL 15 10/28/2023     NHDL 55 10/28/2023       RENAL FUNCTION PANEL -   Lab Results   Component Value Date    GLUCOSE 169 (H) 10/28/2023     10/28/2023    K 4.2 10/28/2023     (H) 10/28/2023    CO2 25 10/28/2023    ANIONGAP 9 (L) 10/28/2023    BUN 32 (H) 10/28/2023    CREATININE 1.19 10/28/2023    GFRMALE 52 (A) 05/07/2023    CALCIUM 8.5 (L) 10/28/2023    PHOS 4.5 10/28/2023    ALBUMIN 3.4 10/28/2023        Lab Results   Component Value Date    BNP 16 10/27/2023    HGBA1C 10.3 (H) 10/28/2023       Last Cardiology Tests:  10/28/2023 - TTE  1. Left ventricular systolic function is normal with a 60% estimated ejection fraction.  2. Moderate aortic valve stenosis.  3. Mild aortic valve regurgitation.    07/20/2022 - Exercise Stress Echo  1. No clinical, echocardiographic or electrocardiographic evidence for ischemia at maximal workload.  2. No ECG changes from baseline.  3. The adequate level of stress was achieved.     12/13/2021 - TTE  1. The left ventricular systolic function is normal with a 60-65% estimated ejection fraction.  2. There is moderate concentric left ventricular hypertrophy.  3. Mild aortic valve stenosis.  4. There is mild aortic valve regurgitation.     07/19/2021 to 08/17/2021 - Event Monitoring  1. Baseline transmission showing sinus rhythm with a heart rate of 67 bpm.   2. PAC burden of less than 1%.  3. PVC burden of 1%.  4. 50 symptomatic events with complaints include chest pain, shortness of breath, or other correlating with sinus rhythm or sinus tachycardia.      06/10/2021 - Cardiac Catheterization (LH)  1. Single vessel coronary artery disease without proximal left anterior descending involvement.  2. Culprit vessel(s): circumflex.  3. Successful PCI of CX.     03/08/2021 - Vascular Lab Renal Artery U/S  1. Renal Artery Duplex: No evidence of an abdominal aortic aneurysm.  2. Right Renal Artery: Right renal arteries demonstrate no evidence of hemodynamically significant stenosis. The right renal  vein is widely patent.  3. Left Renal Artery: Left renal arteries demonstrate no evidence of hemodynamically significant stenosis. The left renal vein is widely patent.  4. Additional Findings: Technically difficult due to body habitus and overlying bowel gas.     05/22/2020 - Exercise Stress Echo  1. The resting ejection fraction was estimated at 55 to 60% with a peak exercise ejection fraction estimated at 55 to 60%.  2. Normal global left ventricular systolic function.  3. Stage: Impost: Basal and mid inferior septum and basal and mid inferolateral wall are abnormal.  4. Hypertensive BP response may reduce specificity of the examination.  5. At peak, there are stress-induced regional wall motion abnormalities.  6. No electrocardiographic evidence for ischemia at maximal workload.  7. The adequate level of stress was achieved.     02/18/2020 - Cardiac Catheterization (LH)  1. Single vessel coronary artery disease without proximal left anterior descending involvement.  2. Successful PCI of Cx.     10/03/2019 - Cardiac Catheterization (LH)  Single vessel coronary artery disease without proximal left anterior descending involvement.     05/21/2019 - Echocardiogram   1. Normal left ventricular size and regional systolic function with an ejection fraction of 55%.  2. Normal right ventricular size and systolic function.  3. Trace mitral regurgitation.  4. Mild aortic regurgitation.   5. Mild aortic stenosis.  6. Trace tricuspid regurgitation.     04/24/2019 - Cardiac Catheterization (LH)  1. Double vessel coronary artery disease without proximal left anterior descending involvement.  2. Successful PTCA/stent of Cx.     03/25/2019 - Echocardiogram   1. Normal left ventricular regional systolic function with an ejection fraction of 55%.  2. Normal right ventricular size and systolic function.  3. Trace mitral regurgitation.  4. Mild aortic regurgitation.   5. Mild aortic stenosis.  6. Trace tricuspid regurgitation.      08/31/2018 - Stress Test  Normal myocardial perfusion scan with normal LV function after exercise stress.     06/06/2018 - Exercise Stress Echocardiogram   1. Abnormal graded exercise treadmill stress test secondary to EKG changes without chest pain.  2. Exercise capacity: Average exercise capacity.  3. Fowler treadmill score is: No applicable for this patient.     Lab review: I have personally reviewed the laboratory result(s).  Diagnostic review: I have personally reviewed the result(s) of the Echocardiogram.    Assessment/Plan   1) CAD s/p Repeat PCI of Cx Feb 2020 and Repeat PCI Cx June 2021, Moderate Aortic Stenosis with Mild Regurgitation  On DAPT - ASA 81 mg daily, Brilinta 90 mg daily  On metoprolol tartrate 25 mg BID, atorvastatin 40 mg daily, amlodipine 5 mg daily, Zetia 10 mg daily, lisinopril 20 mg daily  Needs aggressive control of DM per PCP  Needs aggressive control of BP   TTE 10/28/2023 with LVEF 60%, moderate AS, mild AR   Doing well - denies CP, chest discomfort or SOB  Steroid injections are being planned for management of a bulging disc in lumbar spine  Advised that it would be preferred to remain on Brilinta for steroid injection, but if it needs to be held to remain on ASA.  Followup with Yasmin Gore NP, in 6 months    2) HTN  Stable  Continue lisinopril 20 mg daily, amlodipine 5 mg daily, lisinopril 20 mg daily   Imdur previously discontinued  Renal artery duplex negative TALIA March 2021  Followup with Yasmin Gore NP, in 6 months     3.)Hyperlipidemia  Goal LDL <70  Continue atorvastatin 80 mg daily and Zetia 10 mg daily  Low fat/low cholesterol diet  Lipid panel 10/28/2023 with LDL of 40; at goal  Followup with Yasmin Gore NP, in 6 months     4) Weight Loss  CT chest and abdomen negative for malignancy  Previously referred to GI and Internal Medicine      Scribe Attestation  By signing my name below, I, Angelica Ordonez   attest that this documentation has been prepared  under the direction and in the presence of Alexis Castillo MD.

## 2023-12-18 NOTE — PATIENT INSTRUCTIONS
Continue all current medications as prescribed.   It would be best to perform the steroid injections while on Brilinta, but it cannot be done while on Brilinta, it is safe to hold it so long as you remain on the aspirin.  Followup with Yasmin Gore NP, in 6 months.      If you have any questions or cardiac concerns, please call our office at [phone].

## 2023-12-20 ENCOUNTER — APPOINTMENT (OUTPATIENT)
Dept: PAIN MEDICINE | Facility: HOSPITAL | Age: 63
End: 2023-12-20
Payer: COMMERCIAL

## 2024-06-11 PROBLEM — I50.30 DIASTOLIC HEART FAILURE (MULTI): Status: ACTIVE | Noted: 2024-06-11

## 2024-06-11 PROBLEM — G62.9 NEUROPATHY: Status: ACTIVE | Noted: 2024-06-11

## 2024-06-11 PROBLEM — F17.201 TOBACCO DEPENDENCE IN REMISSION: Status: ACTIVE | Noted: 2024-06-11

## 2024-06-11 PROBLEM — M72.2 PLANTAR FASCIITIS: Status: ACTIVE | Noted: 2024-06-11

## 2024-06-11 PROBLEM — R52 PAIN, UNSPECIFIED: Status: ACTIVE | Noted: 2024-06-11

## 2024-06-11 PROBLEM — R12 HEARTBURN: Status: ACTIVE | Noted: 2024-06-11

## 2024-06-11 PROBLEM — M65.30 ACQUIRED TRIGGER FINGER: Status: ACTIVE | Noted: 2024-06-11

## 2024-06-11 PROBLEM — K20.90 ESOPHAGITIS: Status: ACTIVE | Noted: 2023-01-27

## 2024-06-11 PROBLEM — M25.551 PAIN IN RIGHT HIP: Status: ACTIVE | Noted: 2024-06-11

## 2024-06-11 PROBLEM — M25.659 STIFFNESS OF HIP JOINT: Status: ACTIVE | Noted: 2024-06-11

## 2024-06-11 PROBLEM — R10.9 LEFT FLANK PAIN: Status: ACTIVE | Noted: 2024-06-11

## 2024-06-11 PROBLEM — G56.00 CARPAL TUNNEL SYNDROME: Status: ACTIVE | Noted: 2024-06-11

## 2024-06-11 PROBLEM — G47.30 BREATHING-RELATED SLEEP DISORDER: Status: ACTIVE | Noted: 2024-06-11

## 2024-06-11 PROBLEM — S22.39XA CLOSED FRACTURE OF RIB: Status: ACTIVE | Noted: 2024-06-11

## 2024-06-11 PROBLEM — M20.22: Status: ACTIVE | Noted: 2024-06-11

## 2024-06-11 PROBLEM — H91.90 HEARING LOSS: Status: ACTIVE | Noted: 2024-06-11

## 2024-06-11 PROBLEM — J45.909 ASTHMA (HHS-HCC): Status: ACTIVE | Noted: 2024-06-11

## 2024-06-11 PROBLEM — R35.89 POLYURIC STATE: Status: ACTIVE | Noted: 2024-06-11

## 2024-06-11 PROBLEM — M25.569 ARTHRALGIA OF KNEE: Status: ACTIVE | Noted: 2024-06-11

## 2024-06-11 PROBLEM — Z98.890 HISTORY OF COLONOSCOPY: Status: ACTIVE | Noted: 2024-06-11

## 2024-06-11 PROBLEM — R05.9 COUGH: Status: ACTIVE | Noted: 2024-06-11

## 2024-06-11 PROBLEM — M20.21: Status: ACTIVE | Noted: 2024-06-11

## 2024-06-11 PROBLEM — M77.00 MEDIAL EPICONDYLITIS: Status: ACTIVE | Noted: 2024-06-11

## 2024-06-11 PROBLEM — N52.9 MALE ERECTILE DISORDER: Status: ACTIVE | Noted: 2024-06-11
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
## 2024-06-11 PROBLEM — Z95.820 STATUS POST ANGIOPLASTY WITH STENT: Status: ACTIVE | Noted: 2021-06-10

## 2024-06-11 PROBLEM — J45.909 EXTRINSIC ASTHMA WITHOUT STATUS ASTHMATICUS (HHS-HCC): Status: ACTIVE | Noted: 2024-06-11

## 2024-06-11 PROBLEM — E11.49 NEUROLOGIC DISORDER ASSOCIATED WITH DIABETES MELLITUS (MULTI): Status: ACTIVE | Noted: 2024-06-11

## 2024-06-11 PROBLEM — F10.20 ALCOHOL DEPENDENCE (MULTI): Status: ACTIVE | Noted: 2024-06-11
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
## 2024-06-11 PROBLEM — F43.10 POSTTRAUMATIC STRESS DISORDER: Status: ACTIVE | Noted: 2024-06-11

## 2024-06-11 PROBLEM — F10.21 ALCOHOL DEPENDENCE IN REMISSION (MULTI): Status: ACTIVE | Noted: 2024-06-11

## 2024-06-11 PROBLEM — S29.9XXA INJURY OF CHEST WALL: Status: ACTIVE | Noted: 2024-06-11

## 2024-06-11 PROBLEM — R13.10 DYSPHAGIA: Status: ACTIVE | Noted: 2024-06-11

## 2024-06-11 PROBLEM — R20.0 NUMBNESS: Status: ACTIVE | Noted: 2024-06-11

## 2024-06-11 PROBLEM — J06.9 UPPER RESPIRATORY INFECTION: Status: ACTIVE | Noted: 2024-06-11

## 2024-06-11 PROBLEM — M19.079 OSTEOARTHRITIS OF ANKLE OR FOOT: Status: ACTIVE | Noted: 2024-06-11

## 2024-06-11 PROBLEM — S02.609A: Status: ACTIVE | Noted: 2024-06-11

## 2024-06-11 PROBLEM — K02.53 DENTAL CARIES ON PIT AND FISSURE SURFACE PENETRATING INTO PULP: Status: ACTIVE | Noted: 2024-06-11

## 2024-06-11 PROBLEM — E78.1: Status: ACTIVE | Noted: 2024-06-11

## 2024-06-11 PROBLEM — E66.01 MORBID OBESITY (MULTI): Status: ACTIVE | Noted: 2024-06-11

## 2024-06-11 PROBLEM — E66.3 OVERWEIGHT: Status: ACTIVE | Noted: 2024-06-11

## 2024-06-11 PROBLEM — Z77.29 EXPOSURE TO POTENTIALLY HAZARDOUS SUBSTANCE: Status: ACTIVE | Noted: 2024-06-11

## 2024-06-11 PROBLEM — S02.91XA FRACTURE OF HEAD (MULTI): Status: ACTIVE | Noted: 2024-06-11

## 2024-06-11 PROBLEM — M16.11 PRIMARY OSTEOARTHRITIS OF RIGHT HIP: Status: ACTIVE | Noted: 2024-06-11

## 2024-06-11 PROBLEM — M22.2X9 PATELLOFEMORAL SYNDROME: Status: ACTIVE | Noted: 2024-06-11

## 2024-06-11 PROBLEM — M25.559 ARTHRALGIA OF HIP: Status: ACTIVE | Noted: 2024-06-11

## 2024-06-11 PROBLEM — R06.89 DIFFICULTY BREATHING: Status: ACTIVE | Noted: 2023-01-27

## 2024-06-11 PROBLEM — M70.60 GREATER TROCHANTERIC BURSITIS: Status: ACTIVE | Noted: 2024-06-11

## 2024-06-11 PROBLEM — D50.9 IRON DEFICIENCY ANEMIA: Status: ACTIVE | Noted: 2024-06-11

## 2024-06-11 PROBLEM — F33.1 MODERATE EPISODE OF RECURRENT MAJOR DEPRESSIVE DISORDER (MULTI): Status: ACTIVE | Noted: 2024-06-11

## 2024-06-11 PROBLEM — T16.9XXA FOREIGN BODY IN EAR: Status: ACTIVE | Noted: 2024-06-11

## 2024-06-18 ENCOUNTER — APPOINTMENT (OUTPATIENT)
Dept: CARDIOLOGY | Facility: CLINIC | Age: 64
End: 2024-06-18
Payer: COMMERCIAL

## 2024-08-19 ENCOUNTER — HOSPITAL ENCOUNTER (EMERGENCY)
Facility: HOSPITAL | Age: 64
Discharge: HOME | End: 2024-08-20
Attending: EMERGENCY MEDICINE
Payer: OTHER GOVERNMENT

## 2024-08-19 ENCOUNTER — APPOINTMENT (OUTPATIENT)
Dept: CARDIOLOGY | Facility: HOSPITAL | Age: 64
End: 2024-08-19
Payer: OTHER GOVERNMENT

## 2024-08-19 ENCOUNTER — APPOINTMENT (OUTPATIENT)
Dept: RADIOLOGY | Facility: HOSPITAL | Age: 64
End: 2024-08-19
Payer: OTHER GOVERNMENT

## 2024-08-19 DIAGNOSIS — R10.13 EPIGASTRIC PAIN: Primary | ICD-10-CM

## 2024-08-19 DIAGNOSIS — R11.2 NAUSEA AND VOMITING, UNSPECIFIED VOMITING TYPE: ICD-10-CM

## 2024-08-19 LAB
ALBUMIN SERPL BCP-MCNC: 4.4 G/DL (ref 3.4–5)
ALP SERPL-CCNC: 101 U/L (ref 33–136)
ALT SERPL W P-5'-P-CCNC: 77 U/L (ref 10–52)
ANION GAP SERPL CALC-SCNC: 15 MMOL/L (ref 10–20)
APPEARANCE UR: CLEAR
AST SERPL W P-5'-P-CCNC: 32 U/L (ref 9–39)
BASOPHILS # BLD AUTO: 0.07 X10*3/UL (ref 0–0.1)
BASOPHILS NFR BLD AUTO: 0.6 %
BILIRUB SERPL-MCNC: 0.4 MG/DL (ref 0–1.2)
BILIRUB UR STRIP.AUTO-MCNC: NEGATIVE MG/DL
BUN SERPL-MCNC: 27 MG/DL (ref 6–23)
CALCIUM SERPL-MCNC: 9.4 MG/DL (ref 8.6–10.3)
CARDIAC TROPONIN I PNL SERPL HS: <3 NG/L (ref 0–20)
CHLORIDE SERPL-SCNC: 103 MMOL/L (ref 98–107)
CO2 SERPL-SCNC: 23 MMOL/L (ref 21–32)
COLOR UR: ABNORMAL
CREAT SERPL-MCNC: 1.33 MG/DL (ref 0.5–1.3)
EGFRCR SERPLBLD CKD-EPI 2021: 60 ML/MIN/1.73M*2
EOSINOPHIL # BLD AUTO: 0.41 X10*3/UL (ref 0–0.7)
EOSINOPHIL NFR BLD AUTO: 3.5 %
ERYTHROCYTE [DISTWIDTH] IN BLOOD BY AUTOMATED COUNT: 14.8 % (ref 11.5–14.5)
GLUCOSE SERPL-MCNC: 291 MG/DL (ref 74–99)
GLUCOSE UR STRIP.AUTO-MCNC: ABNORMAL MG/DL
HCT VFR BLD AUTO: 44 % (ref 41–52)
HGB BLD-MCNC: 14.8 G/DL (ref 13.5–17.5)
IMM GRANULOCYTES # BLD AUTO: 0.04 X10*3/UL (ref 0–0.7)
IMM GRANULOCYTES NFR BLD AUTO: 0.3 % (ref 0–0.9)
KETONES UR STRIP.AUTO-MCNC: ABNORMAL MG/DL
LACTATE SERPL-SCNC: 1.2 MMOL/L (ref 0.4–2)
LEUKOCYTE ESTERASE UR QL STRIP.AUTO: NEGATIVE
LIPASE SERPL-CCNC: 63 U/L (ref 9–82)
LYMPHOCYTES # BLD AUTO: 1.39 X10*3/UL (ref 1.2–4.8)
LYMPHOCYTES NFR BLD AUTO: 12 %
MCH RBC QN AUTO: 29.7 PG (ref 26–34)
MCHC RBC AUTO-ENTMCNC: 33.6 G/DL (ref 32–36)
MCV RBC AUTO: 88 FL (ref 80–100)
MONOCYTES # BLD AUTO: 0.48 X10*3/UL (ref 0.1–1)
MONOCYTES NFR BLD AUTO: 4.2 %
NEUTROPHILS # BLD AUTO: 9.17 X10*3/UL (ref 1.2–7.7)
NEUTROPHILS NFR BLD AUTO: 79.4 %
NITRITE UR QL STRIP.AUTO: NEGATIVE
NRBC BLD-RTO: 0 /100 WBCS (ref 0–0)
PH UR STRIP.AUTO: 5.5 [PH]
PLATELET # BLD AUTO: 177 X10*3/UL (ref 150–450)
POTASSIUM SERPL-SCNC: 5.5 MMOL/L (ref 3.5–5.3)
PROT SERPL-MCNC: 7.2 G/DL (ref 6.4–8.2)
PROT UR STRIP.AUTO-MCNC: NEGATIVE MG/DL
RBC # BLD AUTO: 4.99 X10*6/UL (ref 4.5–5.9)
RBC # UR STRIP.AUTO: NEGATIVE /UL
SODIUM SERPL-SCNC: 135 MMOL/L (ref 136–145)
SP GR UR STRIP.AUTO: 1.01
UROBILINOGEN UR STRIP.AUTO-MCNC: NORMAL MG/DL
WBC # BLD AUTO: 11.6 X10*3/UL (ref 4.4–11.3)

## 2024-08-19 PROCEDURE — 74177 CT ABD & PELVIS W/CONTRAST: CPT | Performed by: RADIOLOGY

## 2024-08-19 PROCEDURE — 74177 CT ABD & PELVIS W/CONTRAST: CPT

## 2024-08-19 PROCEDURE — 93005 ELECTROCARDIOGRAM TRACING: CPT

## 2024-08-19 PROCEDURE — 2500000004 HC RX 250 GENERAL PHARMACY W/ HCPCS (ALT 636 FOR OP/ED): Performed by: PHYSICIAN ASSISTANT

## 2024-08-19 PROCEDURE — 36415 COLL VENOUS BLD VENIPUNCTURE: CPT | Performed by: PHYSICIAN ASSISTANT

## 2024-08-19 PROCEDURE — 85025 COMPLETE CBC W/AUTO DIFF WBC: CPT | Performed by: PHYSICIAN ASSISTANT

## 2024-08-19 PROCEDURE — 99285 EMERGENCY DEPT VISIT HI MDM: CPT | Mod: 25

## 2024-08-19 PROCEDURE — 2500000004 HC RX 250 GENERAL PHARMACY W/ HCPCS (ALT 636 FOR OP/ED): Performed by: EMERGENCY MEDICINE

## 2024-08-19 PROCEDURE — 81003 URINALYSIS AUTO W/O SCOPE: CPT | Performed by: PHYSICIAN ASSISTANT

## 2024-08-19 PROCEDURE — 80053 COMPREHEN METABOLIC PANEL: CPT | Performed by: PHYSICIAN ASSISTANT

## 2024-08-19 PROCEDURE — 96374 THER/PROPH/DIAG INJ IV PUSH: CPT

## 2024-08-19 PROCEDURE — 83605 ASSAY OF LACTIC ACID: CPT | Performed by: PHYSICIAN ASSISTANT

## 2024-08-19 PROCEDURE — 2550000001 HC RX 255 CONTRASTS: Performed by: EMERGENCY MEDICINE

## 2024-08-19 PROCEDURE — 96361 HYDRATE IV INFUSION ADD-ON: CPT

## 2024-08-19 PROCEDURE — 96375 TX/PRO/DX INJ NEW DRUG ADDON: CPT

## 2024-08-19 PROCEDURE — 84484 ASSAY OF TROPONIN QUANT: CPT | Performed by: PHYSICIAN ASSISTANT

## 2024-08-19 PROCEDURE — 83690 ASSAY OF LIPASE: CPT | Performed by: PHYSICIAN ASSISTANT

## 2024-08-19 PROCEDURE — 96376 TX/PRO/DX INJ SAME DRUG ADON: CPT

## 2024-08-19 RX ORDER — MORPHINE SULFATE 4 MG/ML
4 INJECTION INTRAVENOUS ONCE
Status: COMPLETED | OUTPATIENT
Start: 2024-08-19 | End: 2024-08-19

## 2024-08-19 RX ORDER — ONDANSETRON HYDROCHLORIDE 2 MG/ML
4 INJECTION, SOLUTION INTRAVENOUS ONCE
Status: COMPLETED | OUTPATIENT
Start: 2024-08-19 | End: 2024-08-19

## 2024-08-19 ASSESSMENT — LIFESTYLE VARIABLES
TOTAL SCORE: 0
EVER FELT BAD OR GUILTY ABOUT YOUR DRINKING: NO
HAVE YOU EVER FELT YOU SHOULD CUT DOWN ON YOUR DRINKING: NO
EVER HAD A DRINK FIRST THING IN THE MORNING TO STEADY YOUR NERVES TO GET RID OF A HANGOVER: NO
HAVE PEOPLE ANNOYED YOU BY CRITICIZING YOUR DRINKING: NO

## 2024-08-19 ASSESSMENT — COLUMBIA-SUICIDE SEVERITY RATING SCALE - C-SSRS
1. IN THE PAST MONTH, HAVE YOU WISHED YOU WERE DEAD OR WISHED YOU COULD GO TO SLEEP AND NOT WAKE UP?: NO
6. HAVE YOU EVER DONE ANYTHING, STARTED TO DO ANYTHING, OR PREPARED TO DO ANYTHING TO END YOUR LIFE?: NO
2. HAVE YOU ACTUALLY HAD ANY THOUGHTS OF KILLING YOURSELF?: NO

## 2024-08-19 ASSESSMENT — PAIN DESCRIPTION - LOCATION: LOCATION: ABDOMEN

## 2024-08-19 ASSESSMENT — PAIN SCALES - GENERAL
PAINLEVEL_OUTOF10: 7
PAINLEVEL_OUTOF10: 6

## 2024-08-19 ASSESSMENT — PAIN DESCRIPTION - PAIN TYPE: TYPE: ACUTE PAIN

## 2024-08-19 ASSESSMENT — PAIN DESCRIPTION - DESCRIPTORS: DESCRIPTORS: SHARP

## 2024-08-19 ASSESSMENT — PAIN DESCRIPTION - FREQUENCY: FREQUENCY: CONSTANT/CONTINUOUS

## 2024-08-19 ASSESSMENT — PAIN - FUNCTIONAL ASSESSMENT: PAIN_FUNCTIONAL_ASSESSMENT: 0-10

## 2024-08-19 ASSESSMENT — PAIN DESCRIPTION - ORIENTATION: ORIENTATION: UPPER

## 2024-08-20 VITALS
SYSTOLIC BLOOD PRESSURE: 127 MMHG | WEIGHT: 165 LBS | RESPIRATION RATE: 18 BRPM | DIASTOLIC BLOOD PRESSURE: 74 MMHG | BODY MASS INDEX: 23.62 KG/M2 | HEIGHT: 70 IN | OXYGEN SATURATION: 96 % | TEMPERATURE: 97.7 F | HEART RATE: 70 BPM

## 2024-08-20 LAB — HOLD SPECIMEN: NORMAL

## 2024-08-20 RX ORDER — DICYCLOMINE HYDROCHLORIDE 20 MG/1
20 TABLET ORAL 4 TIMES DAILY PRN
Qty: 20 TABLET | Refills: 0 | OUTPATIENT
Start: 2024-08-20 | End: 2024-08-22

## 2024-08-20 RX ORDER — ONDANSETRON 4 MG/1
4 TABLET, ORALLY DISINTEGRATING ORAL EVERY 8 HOURS PRN
Qty: 20 TABLET | Refills: 0 | Status: SHIPPED | OUTPATIENT
Start: 2024-08-20 | End: 2024-10-06 | Stop reason: HOSPADM

## 2024-08-20 ASSESSMENT — PAIN SCALES - GENERAL: PAINLEVEL_OUTOF10: 5 - MODERATE PAIN

## 2024-08-20 NOTE — ED PROVIDER NOTES
EMERGENCY MEDICINE EVALUATION NOTE    History of Present Illness     Chief Complaint:   Chief Complaint   Patient presents with    Abdominal Pain     Sudden onset of epigastric pain  vomiting and dizziness this afternoon  denies difficulty with urination or BM          HPI: Manish Lance is a 64 y.o. male presents with a chief complaint of epigastric abdominal pain.  Patient reports that it started earlier this afternoon.  Patient reports that it came on out of nowhere.  He states he has never had any hyperlinks in the past.  Patient denies any history of any pancreatitis or gallbladder issues.  Patient reports that he previously drank a lot of alcohol but currently does not drink alcohol on a regular basis.  Patient denies any fevers or chills.  Patient reports that he had some nausea and vomiting earlier but denies any currently.  Patient Nuys any change in his bowel habits such as diarrhea or constipation.  Patient denies any associated chest pain or shortness of breath.  Patient reports he has a history of diabetes as well as a history of coronary artery disease and hypertension.    Previous History     Past Medical History:   Diagnosis Date    Diabetes mellitus (Multi)     Personal history of other diseases of the circulatory system 10/16/2020    History of heart disease    Personal history of other diseases of the circulatory system 10/16/2020    History of hypertension     Past Surgical History:   Procedure Laterality Date    BACK SURGERY  2017    Back Surgery    CARPAL TUNNEL RELEASE  2017    Neuroplasty Median Nerve At Carpal Tunnel    CORONARY ANGIOPLASTY  2017    PTCA    GASTRIC BYPASS  2017    Gastric Surgery For Morbid Obesity Gastric Bypass     Social History     Tobacco Use    Smoking status: Former     Current packs/day: 0.00     Types: Cigarettes     Quit date:      Years since quittin.6    Smokeless tobacco: Former   Vaping Use    Vaping status: Never Used    Substance Use Topics    Alcohol use: Not Currently    Drug use: Never     Family History   Problem Relation Name Age of Onset    Coronary artery disease Mother      Coronary artery disease Father      Coronary artery disease Brother       No Known Allergies  Current Outpatient Medications   Medication Instructions    acetaminophen (TYLENOL) 325 mg, oral, Every 6 hours PRN, PATIENT MAY TAKE ONE TO TWO TABS    albuterol (ProAir HFA) 90 mcg/actuation inhaler 1 puff, inhalation, Every 4 hours PRN    allopurinol (ZYLOPRIM) 300 mg, oral, Daily    amLODIPine (Norvasc) 5 mg tablet 1 tablet, oral, Daily    aspirin 81 mg, oral, CLARIFY DIRECTIONS    atorvastatin (LIPITOR) 40 mg, oral, Daily    buPROPion SR (WELLBUTRIN SR) 150 mg, oral, Daily    cholecalciferol (Vitamin D-3) 50 MCG (2000 UT) tablet 1 tablet, oral, Daily, CLARIFY DIRECTIONS    colchicine 0.6 mg tablet oral    docusate sodium (Colace) 100 mg capsule 1 capsule, oral, 2 times daily    DULoxetine (CYMBALTA) 60 mg, oral, Daily    empagliflozin (JARDIANCE) 25 mg, oral, CLARIFY DIRECTIONS    ezetimibe (Zetia) 10 mg tablet 1 tablet, oral, Daily    ferrous sulfate 325 (65 Fe) MG tablet 65 mg of iron, oral, 3 times daily    finasteride (PROSCAR) 5 mg, oral, Daily, Do not crush, chew, or split.    fluticasone (Flonase) 50 mcg/actuation nasal spray 2 sprays, nasal, Nightly, CLARIFY DIRECTIONS    glipiZIDE (Glucotrol) 5 mg tablet 1 tablet, oral, Daily    insulin glargine (LANTUS U-100 INSULIN) 26 Units, 2 times daily PRN    lisinopril 20 mg, oral, Daily    melatonin 3 mg capsule oral, 1-2 TABLETS NIGHTLY IF NEEDED    metFORMIN (Glucophage) 1,000 mg tablet 1 tablet, oral, Every 12 hours    methocarbamol (ROBAXIN) 500 mg, oral, 2 times daily    metoprolol tartrate (Lopressor) 25 mg tablet 1 tablet, oral, 2 times daily    naproxen (NAPROSYN) 375 mg, oral    nitroglycerin (NITROSTAT) 0.4 mg, sublingual, Every 5 min PRN, PLACE 1 TABLET UNDER THE TONGUE EVERY 5 MINUTES FOR UP  TO 3 DOSES AS NEEDED FOR CHEST PAIN.CALL 911 IF PAIN PERSISTS.    omeprazole (PriLOSEC) 20 mg DR capsule 1 capsule, oral, 2 times daily    pregabalin (Lyrica) 150 mg capsule 1 capsule, oral, Every morning, AND 2 CAPSULE IN THE EVENING    tamsulosin (Flomax) 0.4 mg 24 hr capsule 1 capsule, oral, Daily    ticagrelor (BRILINTA) 90 mg, oral, 2 times daily    traMADol (Ultram) 50 mg tablet 1 tablet, oral, Every 6 hours PRN    traZODone (Desyrel) 50 mg tablet oral       Physical Exam     Appearance: Alert, oriented , cooperative.  Patient appears to be uncomfortable.     Skin: Intact,  dry skin, no lesions, rash, petechiae or purpura.      Eyes: PERRLA, EOMs intact,  Conjunctiva pink      ENT: Hearing grossly intact. Pharynx clear     Neck: Supple. Trachea at midline.      Pulmonary: Clear bilaterally. No rales, rhonchi or wheezing. No accessory muscle use or stridor.     Cardiac: Normal rate and rhythm without murmur     Abdomen: Soft,  active bowel sounds.  Epigastric abdominal tenderness with slight guarding.     Musculoskeletal: Full range of motion.      Neurological:Cranial nerves II through XII are grossly intact, normal sensation, no weakness, no focal findings identified.     Results     Labs Reviewed   CBC WITH AUTO DIFFERENTIAL - Abnormal       Result Value    WBC 11.6 (*)     nRBC 0.0      RBC 4.99      Hemoglobin 14.8      Hematocrit 44.0      MCV 88      MCH 29.7      MCHC 33.6      RDW 14.8 (*)     Platelets 177      Neutrophils % 79.4      Immature Granulocytes %, Automated 0.3      Lymphocytes % 12.0      Monocytes % 4.2      Eosinophils % 3.5      Basophils % 0.6      Neutrophils Absolute 9.17 (*)     Immature Granulocytes Absolute, Automated 0.04      Lymphocytes Absolute 1.39      Monocytes Absolute 0.48      Eosinophils Absolute 0.41      Basophils Absolute 0.07     COMPREHENSIVE METABOLIC PANEL - Abnormal    Glucose 291 (*)     Sodium 135 (*)     Potassium 5.5 (*)     Chloride 103      Bicarbonate 23       Anion Gap 15      Urea Nitrogen 27 (*)     Creatinine 1.33 (*)     eGFR 60 (*)     Calcium 9.4      Albumin 4.4      Alkaline Phosphatase 101      Total Protein 7.2      AST 32      Bilirubin, Total 0.4      ALT 77 (*)    LIPASE - Normal    Lipase 63      Narrative:     Venipuncture immediately after or during the administration of Metamizole may lead to falsely low results. Testing should be performed immediately prior to Metamizole dosing.   LACTATE - Normal    Lactate 1.2      Narrative:     Venipuncture immediately after or during the administration of Metamizole may lead to falsely low results. Testing should be performed immediately  prior to Metamizole dosing.   TROPONIN I, HIGH SENSITIVITY - Normal    Troponin I, High Sensitivity <3      Narrative:     Less than 99th percentile of normal range cutoff-  Female and children under 18 years old <14 ng/L; Male <21 ng/L: Negative  Repeat testing should be performed if clinically indicated.     Female and children under 18 years old 14-50 ng/L; Male 21-50 ng/L:  Consistent with possible cardiac damage and possible increased clinical   risk. Serial measurements may help to assess extent of myocardial damage.     >50 ng/L: Consistent with cardiac damage, increased clinical risk and  myocardial infarction. Serial measurements may help assess extent of   myocardial damage.      NOTE: Children less than 1 year old may have higher baseline troponin   levels and results should be interpreted in conjunction with the overall   clinical context.     NOTE: Troponin I testing is performed using a different   testing methodology at [facility] than at other   [facility]. Direct result comparisons should only   be made within the same method.   URINALYSIS WITH REFLEX CULTURE AND MICROSCOPIC    Narrative:     The following orders were created for panel order Urinalysis with Reflex Culture and Microscopic.  Procedure                                "Abnormality         Status                     ---------                               -----------         ------                     Urinalysis with Reflex C...[[id number]]                                                 Extra Urine Gray Tube[[id number]]                                                         Please view results for these tests on the individual orders.   URINALYSIS WITH REFLEX CULTURE AND MICROSCOPIC   EXTRA URINE GRAY TUBE     CT abdomen pelvis w IV contrast    (Results Pending)         ED Course & Medical Decision Making     Medications   sodium chloride 0.9 % bolus 1,000 mL (1,000 mL intravenous New Bag 8/19/24 2109)   iohexol (OMNIPaque) 350 mg iodine/mL solution 75 mL (has no administration in time range)   ondansetron (Zofran) injection 4 mg (4 mg intravenous Given 8/19/24 2103)   morphine injection 4 mg (4 mg intravenous Given 8/19/24 2103)     Heart Rate:  [62]   Temperature:  [36.5 °C (97.7 °F)]   Respirations:  [20]   BP: (98)/(68)   Height:  [177.8 cm (5' 10\")]   Weight:  [74.8 kg (165 lb)]   Pulse Ox:  [97 %]    ED Course as of 08/20/24 0444   Mon Aug 19, 2024   2151 Patient signed out to attending was essential change pending reevaluation after CT imaging. [CJ]      ED Course User Index  [CJ] Carlton Shin PA-C         Diagnoses as of 08/20/24 0444   Epigastric pain   Nausea and vomiting, unspecified vomiting type         Procedures   Procedures    Diagnosis     1. Epigastric pain        Disposition   Signed out pending reevaluation    ED Prescriptions    None         Disclaimer: This note was dictated by speech recognition. Minor errors in transcription may be present. Please call if questions.       Carlton Shin PA-C  08/19/24 2152      This patient was seen by the SKIP.  I have personally seen and examined the patient. I made / approved the management plan and take responsibility for patient management. I reviewed and edited the above documentation where necessary.     Patient " presenting with abdominal pain, nausea, vomiting.  Labs are reassuring.  CT scan shows evidence of enteritis.  I discussed this with the patient and family at bedside.  He does not have any high fever, leukocytosis, or prolonged/severe symptoms.  No indication for antibiotics at this time.  We discussed supportive care, follow-up, return precautions.    Beverley Powell DO  Emergency Medicine       Beverley Powell DO  08/20/24 0444

## 2024-08-22 ENCOUNTER — HOSPITAL ENCOUNTER (EMERGENCY)
Facility: HOSPITAL | Age: 64
Discharge: HOME | End: 2024-08-22
Attending: STUDENT IN AN ORGANIZED HEALTH CARE EDUCATION/TRAINING PROGRAM
Payer: OTHER GOVERNMENT

## 2024-08-22 ENCOUNTER — APPOINTMENT (OUTPATIENT)
Dept: RADIOLOGY | Facility: HOSPITAL | Age: 64
End: 2024-08-22
Payer: OTHER GOVERNMENT

## 2024-08-22 ENCOUNTER — APPOINTMENT (OUTPATIENT)
Dept: CARDIOLOGY | Facility: HOSPITAL | Age: 64
End: 2024-08-22
Payer: OTHER GOVERNMENT

## 2024-08-22 VITALS
OXYGEN SATURATION: 96 % | BODY MASS INDEX: 23.19 KG/M2 | HEART RATE: 69 BPM | TEMPERATURE: 98.6 F | DIASTOLIC BLOOD PRESSURE: 70 MMHG | WEIGHT: 162 LBS | SYSTOLIC BLOOD PRESSURE: 114 MMHG | HEIGHT: 70 IN | RESPIRATION RATE: 18 BRPM

## 2024-08-22 DIAGNOSIS — K52.9 ENTEROCOLITIS: Primary | ICD-10-CM

## 2024-08-22 LAB
ALBUMIN SERPL BCP-MCNC: 4 G/DL (ref 3.4–5)
ALP SERPL-CCNC: 69 U/L (ref 33–136)
ALT SERPL W P-5'-P-CCNC: 42 U/L (ref 10–52)
ANION GAP SERPL CALC-SCNC: 14 MMOL/L (ref 10–20)
APPEARANCE UR: CLEAR
AST SERPL W P-5'-P-CCNC: 20 U/L (ref 9–39)
B-OH-BUTYR SERPL-SCNC: 1.31 MMOL/L (ref 0.02–0.27)
BASE EXCESS BLDV CALC-SCNC: -2 MMOL/L (ref -2–3)
BILIRUB SERPL-MCNC: 0.5 MG/DL (ref 0–1.2)
BILIRUB UR STRIP.AUTO-MCNC: NEGATIVE MG/DL
BODY TEMPERATURE: 37 DEGREES CELSIUS
BUN SERPL-MCNC: 22 MG/DL (ref 6–23)
CALCIUM SERPL-MCNC: 9.1 MG/DL (ref 8.6–10.3)
CARDIAC TROPONIN I PNL SERPL HS: 3 NG/L (ref 0–20)
CARDIAC TROPONIN I PNL SERPL HS: 5 NG/L (ref 0–20)
CHLORIDE SERPL-SCNC: 104 MMOL/L (ref 98–107)
CO2 SERPL-SCNC: 24 MMOL/L (ref 21–32)
COLOR UR: ABNORMAL
CREAT SERPL-MCNC: 1.13 MG/DL (ref 0.5–1.3)
EGFRCR SERPLBLD CKD-EPI 2021: 73 ML/MIN/1.73M*2
ERYTHROCYTE [DISTWIDTH] IN BLOOD BY AUTOMATED COUNT: 14.6 % (ref 11.5–14.5)
GLUCOSE BLD MANUAL STRIP-MCNC: 153 MG/DL (ref 74–99)
GLUCOSE SERPL-MCNC: 162 MG/DL (ref 74–99)
GLUCOSE UR STRIP.AUTO-MCNC: ABNORMAL MG/DL
HCO3 BLDV-SCNC: 23.7 MMOL/L (ref 22–26)
HCT VFR BLD AUTO: 39.4 % (ref 41–52)
HGB BLD-MCNC: 13.1 G/DL (ref 13.5–17.5)
INHALED O2 CONCENTRATION: 0 %
KETONES UR STRIP.AUTO-MCNC: ABNORMAL MG/DL
LEUKOCYTE ESTERASE UR QL STRIP.AUTO: NEGATIVE
MCH RBC QN AUTO: 29 PG (ref 26–34)
MCHC RBC AUTO-ENTMCNC: 33.2 G/DL (ref 32–36)
MCV RBC AUTO: 87 FL (ref 80–100)
NITRITE UR QL STRIP.AUTO: NEGATIVE
NRBC BLD-RTO: 0 /100 WBCS (ref 0–0)
OXYHGB MFR BLDV: 65.8 % (ref 45–75)
PCO2 BLDV: 43 MM HG (ref 41–51)
PH BLDV: 7.35 PH (ref 7.33–7.43)
PH UR STRIP.AUTO: 5.5 [PH]
PLATELET # BLD AUTO: 153 X10*3/UL (ref 150–450)
PO2 BLDV: 40 MM HG (ref 35–45)
POTASSIUM SERPL-SCNC: 4.6 MMOL/L (ref 3.5–5.3)
PROT SERPL-MCNC: 6.3 G/DL (ref 6.4–8.2)
PROT UR STRIP.AUTO-MCNC: NEGATIVE MG/DL
RBC # BLD AUTO: 4.52 X10*6/UL (ref 4.5–5.9)
RBC # UR STRIP.AUTO: NEGATIVE /UL
SAO2 % BLDV: 67 % (ref 45–75)
SARS-COV-2 RNA RESP QL NAA+PROBE: NOT DETECTED
SODIUM SERPL-SCNC: 137 MMOL/L (ref 136–145)
SP GR UR STRIP.AUTO: 1.05
UROBILINOGEN UR STRIP.AUTO-MCNC: NORMAL MG/DL
WBC # BLD AUTO: 7.4 X10*3/UL (ref 4.4–11.3)

## 2024-08-22 PROCEDURE — 85027 COMPLETE CBC AUTOMATED: CPT | Performed by: STUDENT IN AN ORGANIZED HEALTH CARE EDUCATION/TRAINING PROGRAM

## 2024-08-22 PROCEDURE — 96361 HYDRATE IV INFUSION ADD-ON: CPT

## 2024-08-22 PROCEDURE — A9698 NON-RAD CONTRAST MATERIALNOC: HCPCS | Performed by: STUDENT IN AN ORGANIZED HEALTH CARE EDUCATION/TRAINING PROGRAM

## 2024-08-22 PROCEDURE — 36415 COLL VENOUS BLD VENIPUNCTURE: CPT | Performed by: STUDENT IN AN ORGANIZED HEALTH CARE EDUCATION/TRAINING PROGRAM

## 2024-08-22 PROCEDURE — 82805 BLOOD GASES W/O2 SATURATION: CPT | Performed by: STUDENT IN AN ORGANIZED HEALTH CARE EDUCATION/TRAINING PROGRAM

## 2024-08-22 PROCEDURE — 81003 URINALYSIS AUTO W/O SCOPE: CPT | Performed by: STUDENT IN AN ORGANIZED HEALTH CARE EDUCATION/TRAINING PROGRAM

## 2024-08-22 PROCEDURE — 2550000001 HC RX 255 CONTRASTS: Performed by: STUDENT IN AN ORGANIZED HEALTH CARE EDUCATION/TRAINING PROGRAM

## 2024-08-22 PROCEDURE — 84484 ASSAY OF TROPONIN QUANT: CPT | Performed by: STUDENT IN AN ORGANIZED HEALTH CARE EDUCATION/TRAINING PROGRAM

## 2024-08-22 PROCEDURE — 74177 CT ABD & PELVIS W/CONTRAST: CPT

## 2024-08-22 PROCEDURE — 84075 ASSAY ALKALINE PHOSPHATASE: CPT | Performed by: STUDENT IN AN ORGANIZED HEALTH CARE EDUCATION/TRAINING PROGRAM

## 2024-08-22 PROCEDURE — 93005 ELECTROCARDIOGRAM TRACING: CPT

## 2024-08-22 PROCEDURE — 2500000004 HC RX 250 GENERAL PHARMACY W/ HCPCS (ALT 636 FOR OP/ED): Performed by: STUDENT IN AN ORGANIZED HEALTH CARE EDUCATION/TRAINING PROGRAM

## 2024-08-22 PROCEDURE — 82010 KETONE BODYS QUAN: CPT | Performed by: STUDENT IN AN ORGANIZED HEALTH CARE EDUCATION/TRAINING PROGRAM

## 2024-08-22 PROCEDURE — 96375 TX/PRO/DX INJ NEW DRUG ADDON: CPT

## 2024-08-22 PROCEDURE — 74177 CT ABD & PELVIS W/CONTRAST: CPT | Performed by: STUDENT IN AN ORGANIZED HEALTH CARE EDUCATION/TRAINING PROGRAM

## 2024-08-22 PROCEDURE — 99285 EMERGENCY DEPT VISIT HI MDM: CPT | Mod: 25

## 2024-08-22 PROCEDURE — 96374 THER/PROPH/DIAG INJ IV PUSH: CPT | Mod: 59

## 2024-08-22 PROCEDURE — 82947 ASSAY GLUCOSE BLOOD QUANT: CPT

## 2024-08-22 PROCEDURE — 87635 SARS-COV-2 COVID-19 AMP PRB: CPT | Performed by: STUDENT IN AN ORGANIZED HEALTH CARE EDUCATION/TRAINING PROGRAM

## 2024-08-22 RX ORDER — ONDANSETRON HYDROCHLORIDE 2 MG/ML
4 INJECTION, SOLUTION INTRAVENOUS ONCE
Status: COMPLETED | OUTPATIENT
Start: 2024-08-22 | End: 2024-08-22

## 2024-08-22 RX ORDER — OXYCODONE HYDROCHLORIDE 5 MG/1
5 TABLET ORAL EVERY 6 HOURS PRN
Qty: 3 TABLET | Refills: 0 | Status: SHIPPED | OUTPATIENT
Start: 2024-08-22 | End: 2024-08-25

## 2024-08-22 RX ORDER — DICYCLOMINE HYDROCHLORIDE 20 MG/1
20 TABLET ORAL 2 TIMES DAILY
Qty: 20 TABLET | Refills: 0 | Status: SHIPPED | OUTPATIENT
Start: 2024-08-22 | End: 2024-09-01

## 2024-08-22 RX ORDER — METOCLOPRAMIDE HYDROCHLORIDE 5 MG/ML
10 INJECTION INTRAMUSCULAR; INTRAVENOUS ONCE
Status: COMPLETED | OUTPATIENT
Start: 2024-08-22 | End: 2024-08-22

## 2024-08-22 RX ORDER — METOCLOPRAMIDE 10 MG/1
10 TABLET ORAL EVERY 6 HOURS
Qty: 28 TABLET | Refills: 0 | Status: SHIPPED | OUTPATIENT
Start: 2024-08-22 | End: 2024-08-29

## 2024-08-22 RX ORDER — MORPHINE SULFATE 4 MG/ML
4 INJECTION INTRAVENOUS ONCE
Status: COMPLETED | OUTPATIENT
Start: 2024-08-22 | End: 2024-08-22

## 2024-08-22 RX ORDER — CIPROFLOXACIN 500 MG/1
500 TABLET ORAL 2 TIMES DAILY
Qty: 14 TABLET | Refills: 0 | Status: SHIPPED | OUTPATIENT
Start: 2024-08-22 | End: 2024-08-29

## 2024-08-22 RX ORDER — METRONIDAZOLE 500 MG/1
500 TABLET ORAL 3 TIMES DAILY
Qty: 30 TABLET | Refills: 0 | Status: SHIPPED | OUTPATIENT
Start: 2024-08-22 | End: 2024-09-01

## 2024-08-22 RX ADMIN — IOHEXOL 500 ML: 12 SOLUTION ORAL at 17:05

## 2024-08-22 RX ADMIN — SODIUM CHLORIDE 1000 ML: 9 INJECTION, SOLUTION INTRAVENOUS at 19:32

## 2024-08-22 RX ADMIN — IOHEXOL 75 ML: 350 INJECTION, SOLUTION INTRAVENOUS at 18:51

## 2024-08-22 RX ADMIN — ONDANSETRON 4 MG: 2 INJECTION INTRAMUSCULAR; INTRAVENOUS at 16:47

## 2024-08-22 RX ADMIN — METOCLOPRAMIDE 10 MG: 5 INJECTION, SOLUTION INTRAMUSCULAR; INTRAVENOUS at 19:32

## 2024-08-22 RX ADMIN — SODIUM CHLORIDE 1000 ML: 9 INJECTION, SOLUTION INTRAVENOUS at 16:47

## 2024-08-22 RX ADMIN — MORPHINE SULFATE 4 MG: 4 INJECTION INTRAVENOUS at 16:47

## 2024-08-22 RX ADMIN — HYDROMORPHONE HYDROCHLORIDE 0.4 MG: 1 INJECTION, SOLUTION INTRAMUSCULAR; INTRAVENOUS; SUBCUTANEOUS at 17:40

## 2024-08-22 ASSESSMENT — PAIN SCALES - GENERAL
PAINLEVEL_OUTOF10: 7
PAINLEVEL_OUTOF10: 0 - NO PAIN
PAINLEVEL_OUTOF10: 10 - WORST POSSIBLE PAIN
PAINLEVEL_OUTOF10: 10 - WORST POSSIBLE PAIN
PAINLEVEL_OUTOF10: 7

## 2024-08-22 ASSESSMENT — PAIN - FUNCTIONAL ASSESSMENT
PAIN_FUNCTIONAL_ASSESSMENT: 0-10

## 2024-08-22 ASSESSMENT — PAIN DESCRIPTION - LOCATION
LOCATION: ABDOMEN
LOCATION: ABDOMEN

## 2024-08-22 ASSESSMENT — PAIN DESCRIPTION - PAIN TYPE: TYPE: ACUTE PAIN

## 2024-08-22 ASSESSMENT — PAIN DESCRIPTION - ONSET: ONSET: PROGRESSIVE

## 2024-08-22 ASSESSMENT — PAIN DESCRIPTION - DESCRIPTORS: DESCRIPTORS: SHARP

## 2024-08-22 ASSESSMENT — PAIN DESCRIPTION - FREQUENCY: FREQUENCY: CONSTANT/CONTINUOUS

## 2024-08-22 NOTE — ED PROVIDER NOTES
[facility] EMERGENCY DEPARTMENT ENCOUNTER    Pt Name:Manish Lance  MRN: [id number]  Birthdate 1960  Date of evaluation: 24  PCP:  NINA Shaw    CHIEF COMPLAINT       Chief Complaint   Patient presents with    abdominal pain       HISTORY OF PRESENT ILLNESS  (Location/Symptom, Timing/Onset, Context/Setting, Quality, Duration, Modifying Factors, Severity.)      Manish Lance is a 64 y.o. male who presents with abdominal pain, nausea, vomiting and diarrhea. 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 He was seen on  for similar symptoms and reports he initially felt better when he left the ED but the pain returned once he went home and he has been unable to tolerate more than small sips of water since that time despite antiemetics. He denies fevers, chest pain, SOB, hematemesis, hematochezia, melena, flank or back pain, dysuria, hematuria, sick contacts.     PAST MEDICAL / SURGICAL / SOCIAL / FAMILY HISTORY      has a past medical history of Diabetes mellitus (Multi), Personal history of other diseases of the circulatory system (10/16/2020), and Personal history of other diseases of the circulatory system (10/16/2020).       has a past surgical history that includes Coronary angioplasty (2017); Gastric bypass (2017); Back surgery (2017); and Carpal tunnel release (2017).      Social History     Socioeconomic History    Marital status:      Spouse name: Not on file    Number of children: Not on file    Years of education: Not on file    Highest education level: Not on file   Occupational History    Not on file   Tobacco Use    Smoking status: Former     Current packs/day: 0.00     Types: Cigarettes     Quit date:      Years since quittin.6    Smokeless tobacco: Former   Vaping Use    Vaping status: Never Used   Substance and Sexual Activity    Alcohol use: Not Currently    Drug use: Never     Sexual activity: Not on file   Other Topics Concern    Not on file   Social History Narrative    Not on file     Social Determinants of Health     Financial Resource Strain: Not on file   Food Insecurity: Not on file   Transportation Needs: Not on file   Physical Activity: Not on file   Stress: Not on file   Social Connections: Not on file   Intimate Partner Violence: Not on file   Housing Stability: Not on file       Family History   Problem Relation Name Age of Onset    Coronary artery disease Mother      Coronary artery disease Father      Coronary artery disease Brother         Allergies:  Patient has no known allergies.    Home Medications:  Prior to Admission medications    Medication Sig Start Date End Date Taking? Authorizing Provider   acetaminophen (TylenoL) 325 mg tablet Take 1 tablet (325 mg) by mouth every 6 hours if needed. PATIENT MAY TAKE ONE TO TWO TABS 7/14/17   Historical Provider, MD   albuterol (ProAir HFA) 90 mcg/actuation inhaler Inhale 1 puff every 4 hours if needed. 7/14/17   Historical Provider, MD   allopurinol (Zyloprim) 300 mg tablet Take 1 tablet (300 mg) by mouth once daily. 4/1/19   Historical Provider, MD   amLODIPine (Norvasc) 5 mg tablet Take 1 tablet (5 mg) by mouth once daily. 10/12/22   Historical Provider, MD   aspirin 81 mg EC tablet Take 1 tablet (81 mg) by mouth. CLARIFY DIRECTIONS    Historical Provider, MD   atorvastatin (Lipitor) 40 mg tablet Take 1 tablet (40 mg) by mouth once daily. 7/6/17   Historical Provider, MD   buPROPion SR (Wellbutrin SR) 150 mg 12 hr tablet Take 1 tablet (150 mg) by mouth once daily. 10/12/22   Historical Provider, MD   cholecalciferol (Vitamin D-3) 50 MCG (2000 UT) tablet Take 1 tablet (2,000 Units) by mouth once daily. CLARIFY DIRECTIONS 10/12/22   Historical Provider, MD   colchicine 0.6 mg tablet Take by mouth.    Historical Provider, MD   dicyclomine (Bentyl) 20 mg tablet Take 1 tablet (20 mg) by mouth 4 times a day as needed (abdominal  cramping). 8/20/24   Beverley Powell,    docusate sodium (Colace) 100 mg capsule Take 1 capsule (100 mg) by mouth 2 times a day.    Historical Provider, MD   DULoxetine (Cymbalta) 60 mg DR capsule Take 1 capsule (60 mg) by mouth once daily. 10/12/22   Historical Provider, MD   empagliflozin (Jardiance) 25 mg Take 1 tablet (25 mg) by mouth. CLARIFY DIRECTIONS 10/12/22   Historical Provider, MD   ezetimibe (Zetia) 10 mg tablet Take 1 tablet (10 mg) by mouth once daily. 1/21/21   Historical Provider, MD   ferrous sulfate 325 (65 Fe) MG tablet Take 1 tablet by mouth 3 times a day. 10/12/22   Historical Provider, MD   finasteride (Proscar) 5 mg tablet Take 1 tablet (5 mg) by mouth once daily. Do not crush, chew, or split. 10/28/23 10/27/24  Panchito Ochoa MD PhD   fluticasone (Flonase) 50 mcg/actuation nasal spray Administer 2 sprays into affected nostril(s) once daily at bedtime. CLARIFY DIRECTIONS 10/12/22   Historical Provider, MD   glipiZIDE (Glucotrol) 5 mg tablet Take 1 tablet (5 mg) by mouth once daily.    Historical Provider, MD   insulin glargine (Lantus U-100 Insulin) 100 unit/mL injection 26 Units 2 times a day as needed (BASED ON BLOOD SUGAR LEVELS).    Historical Provider, MD   lisinopril 20 mg tablet Take 1 tablet (20 mg) by mouth once daily. 1/21/21   Historical Provider, MD   melatonin 3 mg capsule Take by mouth. 1-2 TABLETS NIGHTLY IF NEEDED 10/12/22   Historical Provider, MD   metFORMIN (Glucophage) 1,000 mg tablet Take 1 tablet (1,000 mg) by mouth every 12 hours. 7/14/16   Historical Provider, MD   methocarbamol (Robaxin) 500 mg tablet Take 1 tablet (500 mg) by mouth 2 times a day for 10 days. 10/19/23 12/18/23  Armando Mao, APRN-CNP   metoprolol tartrate (Lopressor) 25 mg tablet Take 1 tablet (25 mg) by mouth 2 times a day. 4/1/19   Historical Provider, MD   naproxen (Naprosyn) 375 mg tablet Take 1 tablet (375 mg) by mouth. 5/13/24   Historical Provider, MD   nitroglycerin (Nitrostat) 0.4 mg SL tablet  "Place 1 tablet (0.4 mg) under the tongue every 5 minutes if needed for chest pain (MAX THREE TABS PER EPISODE). PLACE 1 TABLET UNDER THE TONGUE EVERY 5 MINUTES FOR UP TO 3 DOSES AS NEEDED FOR CHEST PAIN.CALL 911 IF PAIN PERSISTS. 6/6/18   Historical Provider, MD   omeprazole (PriLOSEC) 20 mg DR capsule Take 1 capsule (20 mg) by mouth twice a day. 10/12/22   Historical Provider, MD   ondansetron ODT (Zofran-ODT) 4 mg disintegrating tablet Take 1 tablet (4 mg) by mouth every 8 hours if needed for nausea or vomiting. 8/20/24   Beverley Powell DO   pregabalin (Lyrica) 150 mg capsule Take 1 capsule (150 mg) by mouth once daily in the morning. AND 2 CAPSULE IN THE EVENING 10/12/22   Historical Provider, MD   tamsulosin (Flomax) 0.4 mg 24 hr capsule Take 1 capsule (0.4 mg) by mouth once daily. 7/14/16   Historical Provider, MD   ticagrelor (Brilinta) 90 mg tablet Take 1 tablet (90 mg) by mouth twice a day. 7/6/17   Historical Provider, MD   traMADol (Ultram) 50 mg tablet Take 1 tablet (50 mg) by mouth every 6 hours if needed for severe pain (7 - 10).    Historical Provider, MD   traZODone (Desyrel) 50 mg tablet Take by mouth.    Historical Provider, MD       REVIEW OF SYSTEMS       Review of Systems   Constitutional:  Negative for fever.   HENT: Negative.     Respiratory: Negative.     Cardiovascular: Negative.    Gastrointestinal:  Positive for abdominal pain, diarrhea, nausea and vomiting. Negative for blood in stool.   Genitourinary: Negative.    Musculoskeletal: Negative.    Neurological: Negative.        PHYSICAL EXAM      INITIAL VITALS:   /70   Pulse 69   Temp 37 °C (98.6 °F) (Temporal)   Resp 18   Ht 1.778 m (5' 10\")   Wt 73.5 kg (162 lb)   SpO2 96%   BMI 23.24 kg/m²     Physical Exam  Constitutional:       Appearance: He is not toxic-appearing.   Cardiovascular:      Rate and Rhythm: Normal rate and regular rhythm.   Pulmonary:      Effort: Pulmonary effort is normal.      Breath sounds: Normal breath " sounds.   Abdominal:      Palpations: Abdomen is soft.      Tenderness: There is no abdominal tenderness. There is no right CVA tenderness, left CVA tenderness, guarding or rebound.   Musculoskeletal:         General: Normal range of motion.   Neurological:      General: No focal deficit present.      Mental Status: He is alert and oriented to person, place, and time.           DDX/DIAGNOSTIC RESULTS / EMERGENCY DEPARTMENT COURSE / [facility]     Medical Decision Making  63 yo M with hx robyn en y gastric bypass twenty years prior and DM on lantus and metformin presents for repeat visit for lower abdominal pain, nausea, nonbloody emesis, nonbloody diarrhea. Difficulty tolerating PO intake at home after discharge. On exam, nontoxic appearing. Vitals are stable. Abdomen is soft and nontender in all quadrants. He reports frequent dry heaving and vomiting. CT abdomen pelvis from prior visit on 8/19 showing enterocolitis. Given history of gastric bypass and persistence of increased intraabdominal pressure secondary to frequent vomiting, will repeat CT with PO and IV contrast to evaluate for anastomosis patency and change in prior findings of enterocolitis.         EMERGENCY DEPARTMENT COURSE:    ED Course as of 08/23/24 0245   u Aug 22, 2024   1737 Patient given PO contrast, was able to tolerate PO. Thirty minutes later reports his pain remains a 12/10. Abdomen remains soft and nontender, no rebound rigidity or guarding. No pain moving into the back. Equal radial pulses bilaterally, /76. Low clinical suspicion for aortic dissection at this time given lack of risk factors and benign clinical findings thus far. Will proceed with CT abdomen pelvis with IV and PO contrast. [JG]   7157 Discussed all findings at length with patient and wife at bedside.  He is now able to tolerate p.o. intake.  Discussed admission versus discharge based on patient's symptoms and ability to tolerate p.o. intake.  At this time patient feels well  enough to go home.  Will give a short course of p.o. antibiotics for colitis given persistence of symptoms, did discuss the risk of C. difficile and monitoring for any onset of new symptoms.  Given two tablets of roxicodone for breakthrough pain, discussed importance of not taking narcotic medication unless absolutely necessary, and taking least dosage possible to control pain, and not driving or operating heavy machinery within 8 hours. Patient agreed and states he will avoid narcotics unless pain is severe. Discussed importance of follow-up with PCP in the next few days.  Discussed return precautions at length.  Patient able to tolerate p.o. diet without difficulty while in the emergency department.  Patient and wife voiced understanding and agreement with plan. [JG]      ED Course User Index  [JG] Jessica Harris MD         Diagnoses as of 08/23/24 0245   Enterocolitis     CONSULTS:  None    FINAL IMPRESSION      1. Enterocolitis          DISPOSITION / PLAN     8/22/2024  9:28 PM     PATIENT REFERRED TO:  Pamela Savage, APRN-CNP  [address] [address]  [phone]    Schedule an appointment as soon as possible for a visit       [facility] Emergency Medicine  [address] [place] [id number] [phone]  Go to   If symptoms worsen      DISCHARGE MEDICATIONS:  Discharge Medication List as of 8/22/2024  9:13 PM        START taking these medications    Details   ciprofloxacin (Cipro) 500 mg tablet Take 1 tablet (500 mg) by mouth 2 times a day for 7 days., Starting Thu 8/22/2024, Until Thu 8/29/2024, Normal      metoclopramide (Reglan) 10 mg tablet Take 1 tablet (10 mg) by mouth every 6 hours for 7 days., Starting u 8/22/2024, Until Thu 8/29/2024, Normal      metroNIDAZOLE (Flagyl) 500 mg tablet Take 1 tablet (500 mg) by mouth 3 times a day for 10 days., Starting u 8/22/2024, Until Sun 9/1/2024, Normal      oxyCODONE (Roxicodone) 5 mg  immediate release tablet Take 1 tablet (5 mg) by mouth every 6 hours if needed for severe pain (7 - 10) for up to 3 days., Starting Thu 8/22/2024, Until Sun 8/25/2024 at 2359, Normal             Jessica Harris MD  Emergency Medicine Attending Physician    (Please note that portions of this note were completed with a voice recognition program.  Efforts were made to edit the dictations but occasionally words are mis-transcribed.)     Jessica Harris MD  08/23/24 0244       Jessica Harris MD  08/23/24 0245

## 2024-08-23 LAB
ATRIAL RATE: 59 BPM
ATRIAL RATE: 61 BPM
HOLD SPECIMEN: NORMAL
P AXIS: 39 DEGREES
P AXIS: 51 DEGREES
PR INTERVAL: 162 MS
PR INTERVAL: 173 MS
Q ONSET: 251 MS
Q ONSET: 251 MS
QRS COUNT: 10 BEATS
QRS COUNT: 10 BEATS
QRS DURATION: 91 MS
QRS DURATION: 95 MS
QT INTERVAL: 404 MS
QT INTERVAL: 414 MS
QTC CALCULATION(BAZETT): 407 MS
QTC CALCULATION(BAZETT): 411 MS
QTC FREDERICIA: 406 MS
QTC FREDERICIA: 411 MS
R AXIS: 11 DEGREES
R AXIS: 30 DEGREES
T AXIS: 31 DEGREES
T AXIS: 41 DEGREES
T OFFSET: 453 MS
T OFFSET: 458 MS
VENTRICULAR RATE: 59 BPM
VENTRICULAR RATE: 61 BPM

## 2024-08-23 ASSESSMENT — ENCOUNTER SYMPTOMS
MUSCULOSKELETAL NEGATIVE: 1
NAUSEA: 1
FEVER: 0
NEUROLOGICAL NEGATIVE: 1
ABDOMINAL PAIN: 1
BLOOD IN STOOL: 0
VOMITING: 1
RESPIRATORY NEGATIVE: 1
DIARRHEA: 1
CARDIOVASCULAR NEGATIVE: 1

## 2024-08-23 NOTE — DISCHARGE INSTRUCTIONS
You were seen in the emergency department and diagnosed with enterocolitis (inflammation and infection in the GI tract).  Your findings appear to be slightly improved from 2 days prior.  You were given several medications for symptom relief, and was started on a course of oral antibiotics.  We did discuss the risk of this a course of oral antibiotics carries a small risk of C. difficile.  Please monitor to ensure you are not developing any increasingly frequent diarrhea to ensure you are not developing this infection.    Please take all medications as prescribed.  If you are taking narcotics, please do not drive or operate heavy machinery within 8 hours.    Please call your primary care provider for follow-up in the next few days.    Please return the emergency department any worsening symptoms including unable to eat or drink for more than 12 hours, worsening abdominal pain, excessive vomiting, fevers that do not come down with Tylenol or Motrin, or other concerns.

## 2024-09-04 ENCOUNTER — APPOINTMENT (OUTPATIENT)
Dept: GASTROENTEROLOGY | Facility: CLINIC | Age: 64
End: 2024-09-04
Payer: OTHER GOVERNMENT

## 2024-09-16 ENCOUNTER — HOSPITAL ENCOUNTER (OUTPATIENT)
Facility: HOSPITAL | Age: 64
Setting detail: OBSERVATION
Discharge: HOME | End: 2024-09-18
Attending: EMERGENCY MEDICINE | Admitting: STUDENT IN AN ORGANIZED HEALTH CARE EDUCATION/TRAINING PROGRAM
Payer: OTHER GOVERNMENT

## 2024-09-16 ENCOUNTER — APPOINTMENT (OUTPATIENT)
Dept: RADIOLOGY | Facility: HOSPITAL | Age: 64
End: 2024-09-16
Payer: OTHER GOVERNMENT

## 2024-09-16 ENCOUNTER — APPOINTMENT (OUTPATIENT)
Dept: CARDIOLOGY | Facility: HOSPITAL | Age: 64
End: 2024-09-16
Payer: OTHER GOVERNMENT

## 2024-09-16 ENCOUNTER — TELEPHONE (OUTPATIENT)
Dept: CARDIOLOGY | Facility: HOSPITAL | Age: 64
End: 2024-09-16
Payer: OTHER GOVERNMENT

## 2024-09-16 DIAGNOSIS — M16.11 PRIMARY OSTEOARTHRITIS OF RIGHT HIP: ICD-10-CM

## 2024-09-16 DIAGNOSIS — E86.0 DEHYDRATION: Primary | ICD-10-CM

## 2024-09-16 DIAGNOSIS — K52.9 GASTROENTERITIS: ICD-10-CM

## 2024-09-16 DIAGNOSIS — R55 SYNCOPE, UNSPECIFIED SYNCOPE TYPE: ICD-10-CM

## 2024-09-16 DIAGNOSIS — R10.13 EPIGASTRIC PAIN: ICD-10-CM

## 2024-09-16 PROBLEM — N17.9 ACUTE KIDNEY INJURY (CMS-HCC): Status: ACTIVE | Noted: 2024-09-16

## 2024-09-16 PROBLEM — I10 BENIGN ESSENTIAL HYPERTENSION: Status: ACTIVE | Noted: 2023-09-18

## 2024-09-16 LAB
ALBUMIN SERPL BCP-MCNC: 3.9 G/DL (ref 3.4–5)
ALP SERPL-CCNC: 61 U/L (ref 33–136)
ALT SERPL W P-5'-P-CCNC: 60 U/L (ref 10–52)
ANION GAP SERPL CALC-SCNC: 14 MMOL/L (ref 10–20)
APPEARANCE UR: CLEAR
AST SERPL W P-5'-P-CCNC: 35 U/L (ref 9–39)
BACTERIA #/AREA URNS AUTO: ABNORMAL /HPF
BASOPHILS # BLD AUTO: 0.08 X10*3/UL (ref 0–0.1)
BASOPHILS NFR BLD AUTO: 1.1 %
BILIRUB SERPL-MCNC: 0.3 MG/DL (ref 0–1.2)
BILIRUB UR STRIP.AUTO-MCNC: NEGATIVE MG/DL
BUN SERPL-MCNC: 44 MG/DL (ref 6–23)
CALCIUM SERPL-MCNC: 8.3 MG/DL (ref 8.6–10.3)
CARDIAC TROPONIN I PNL SERPL HS: 3 NG/L (ref 0–20)
CHLORIDE SERPL-SCNC: 106 MMOL/L (ref 98–107)
CO2 SERPL-SCNC: 18 MMOL/L (ref 21–32)
COLOR UR: ABNORMAL
CREAT SERPL-MCNC: 1.57 MG/DL (ref 0.5–1.3)
EGFRCR SERPLBLD CKD-EPI 2021: 49 ML/MIN/1.73M*2
EOSINOPHIL # BLD AUTO: 0.71 X10*3/UL (ref 0–0.7)
EOSINOPHIL NFR BLD AUTO: 9.8 %
ERYTHROCYTE [DISTWIDTH] IN BLOOD BY AUTOMATED COUNT: 14.7 % (ref 11.5–14.5)
GLUCOSE SERPL-MCNC: 179 MG/DL (ref 74–99)
GLUCOSE UR STRIP.AUTO-MCNC: ABNORMAL MG/DL
HCT VFR BLD AUTO: 38.3 % (ref 41–52)
HGB BLD-MCNC: 12.7 G/DL (ref 13.5–17.5)
HYALINE CASTS #/AREA URNS AUTO: ABNORMAL /LPF
IMM GRANULOCYTES # BLD AUTO: 0.02 X10*3/UL (ref 0–0.7)
IMM GRANULOCYTES NFR BLD AUTO: 0.3 % (ref 0–0.9)
INR PPP: 1 (ref 0.9–1.1)
KETONES UR STRIP.AUTO-MCNC: NEGATIVE MG/DL
LEUKOCYTE ESTERASE UR QL STRIP.AUTO: NEGATIVE
LIPASE SERPL-CCNC: 60 U/L (ref 9–82)
LYMPHOCYTES # BLD AUTO: 1.53 X10*3/UL (ref 1.2–4.8)
LYMPHOCYTES NFR BLD AUTO: 21.2 %
MCH RBC QN AUTO: 29.2 PG (ref 26–34)
MCHC RBC AUTO-ENTMCNC: 33.2 G/DL (ref 32–36)
MCV RBC AUTO: 88 FL (ref 80–100)
MONOCYTES # BLD AUTO: 0.55 X10*3/UL (ref 0.1–1)
MONOCYTES NFR BLD AUTO: 7.6 %
MUCOUS THREADS #/AREA URNS AUTO: ABNORMAL /LPF
NEUTROPHILS # BLD AUTO: 4.32 X10*3/UL (ref 1.2–7.7)
NEUTROPHILS NFR BLD AUTO: 60 %
NITRITE UR QL STRIP.AUTO: NEGATIVE
NRBC BLD-RTO: 0 /100 WBCS (ref 0–0)
PH UR STRIP.AUTO: 5 [PH]
PLATELET # BLD AUTO: 140 X10*3/UL (ref 150–450)
POTASSIUM SERPL-SCNC: 5 MMOL/L (ref 3.5–5.3)
PROT SERPL-MCNC: 6 G/DL (ref 6.4–8.2)
PROT UR STRIP.AUTO-MCNC: ABNORMAL MG/DL
PROTHROMBIN TIME: 11.2 SECONDS (ref 9.8–12.8)
RBC # BLD AUTO: 4.35 X10*6/UL (ref 4.5–5.9)
RBC # UR STRIP.AUTO: NEGATIVE /UL
RBC #/AREA URNS AUTO: ABNORMAL /HPF
SODIUM SERPL-SCNC: 133 MMOL/L (ref 136–145)
SP GR UR STRIP.AUTO: 1.02
UROBILINOGEN UR STRIP.AUTO-MCNC: NORMAL MG/DL
WBC # BLD AUTO: 7.2 X10*3/UL (ref 4.4–11.3)
WBC #/AREA URNS AUTO: ABNORMAL /HPF

## 2024-09-16 PROCEDURE — 2550000001 HC RX 255 CONTRASTS: Performed by: EMERGENCY MEDICINE

## 2024-09-16 PROCEDURE — 83690 ASSAY OF LIPASE: CPT | Performed by: EMERGENCY MEDICINE

## 2024-09-16 PROCEDURE — 2500000004 HC RX 250 GENERAL PHARMACY W/ HCPCS (ALT 636 FOR OP/ED): Performed by: STUDENT IN AN ORGANIZED HEALTH CARE EDUCATION/TRAINING PROGRAM

## 2024-09-16 PROCEDURE — 99285 EMERGENCY DEPT VISIT HI MDM: CPT | Mod: 25

## 2024-09-16 PROCEDURE — 96361 HYDRATE IV INFUSION ADD-ON: CPT

## 2024-09-16 PROCEDURE — 80053 COMPREHEN METABOLIC PANEL: CPT | Performed by: EMERGENCY MEDICINE

## 2024-09-16 PROCEDURE — 71275 CT ANGIOGRAPHY CHEST: CPT | Performed by: RADIOLOGY

## 2024-09-16 PROCEDURE — 85025 COMPLETE CBC W/AUTO DIFF WBC: CPT | Performed by: EMERGENCY MEDICINE

## 2024-09-16 PROCEDURE — 36415 COLL VENOUS BLD VENIPUNCTURE: CPT | Performed by: EMERGENCY MEDICINE

## 2024-09-16 PROCEDURE — 2500000004 HC RX 250 GENERAL PHARMACY W/ HCPCS (ALT 636 FOR OP/ED): Performed by: EMERGENCY MEDICINE

## 2024-09-16 PROCEDURE — 93005 ELECTROCARDIOGRAM TRACING: CPT

## 2024-09-16 PROCEDURE — 96374 THER/PROPH/DIAG INJ IV PUSH: CPT | Mod: 59

## 2024-09-16 PROCEDURE — 84484 ASSAY OF TROPONIN QUANT: CPT | Performed by: EMERGENCY MEDICINE

## 2024-09-16 PROCEDURE — 81001 URINALYSIS AUTO W/SCOPE: CPT | Performed by: EMERGENCY MEDICINE

## 2024-09-16 PROCEDURE — 74174 CTA ABD&PLVS W/CONTRAST: CPT | Performed by: RADIOLOGY

## 2024-09-16 PROCEDURE — 85610 PROTHROMBIN TIME: CPT | Performed by: EMERGENCY MEDICINE

## 2024-09-16 PROCEDURE — 99223 1ST HOSP IP/OBS HIGH 75: CPT | Performed by: STUDENT IN AN ORGANIZED HEALTH CARE EDUCATION/TRAINING PROGRAM

## 2024-09-16 PROCEDURE — 71275 CT ANGIOGRAPHY CHEST: CPT

## 2024-09-16 PROCEDURE — 96375 TX/PRO/DX INJ NEW DRUG ADDON: CPT | Mod: 59

## 2024-09-16 RX ORDER — ONDANSETRON 4 MG/1
4 TABLET, FILM COATED ORAL EVERY 8 HOURS PRN
Status: DISCONTINUED | OUTPATIENT
Start: 2024-09-16 | End: 2024-09-18 | Stop reason: HOSPADM

## 2024-09-16 RX ORDER — BISACODYL 5 MG
10 TABLET, DELAYED RELEASE (ENTERIC COATED) ORAL DAILY PRN
Status: DISCONTINUED | OUTPATIENT
Start: 2024-09-16 | End: 2024-09-18 | Stop reason: HOSPADM

## 2024-09-16 RX ORDER — SODIUM CHLORIDE, SODIUM LACTATE, POTASSIUM CHLORIDE, CALCIUM CHLORIDE 600; 310; 30; 20 MG/100ML; MG/100ML; MG/100ML; MG/100ML
75 INJECTION, SOLUTION INTRAVENOUS CONTINUOUS
Status: ACTIVE | OUTPATIENT
Start: 2024-09-16 | End: 2024-09-17

## 2024-09-16 RX ORDER — ONDANSETRON HYDROCHLORIDE 2 MG/ML
4 INJECTION, SOLUTION INTRAVENOUS EVERY 8 HOURS PRN
Status: DISCONTINUED | OUTPATIENT
Start: 2024-09-16 | End: 2024-09-18 | Stop reason: HOSPADM

## 2024-09-16 RX ORDER — BISACODYL 10 MG/1
10 SUPPOSITORY RECTAL DAILY PRN
Status: DISCONTINUED | OUTPATIENT
Start: 2024-09-16 | End: 2024-09-18 | Stop reason: HOSPADM

## 2024-09-16 RX ORDER — PANTOPRAZOLE SODIUM 40 MG/1
40 TABLET, DELAYED RELEASE ORAL
Status: DISCONTINUED | OUTPATIENT
Start: 2024-09-17 | End: 2024-09-18 | Stop reason: HOSPADM

## 2024-09-16 RX ORDER — ALBUTEROL SULFATE 90 UG/1
1 INHALANT RESPIRATORY (INHALATION) EVERY 4 HOURS PRN
Status: DISCONTINUED | OUTPATIENT
Start: 2024-09-16 | End: 2024-09-18 | Stop reason: HOSPADM

## 2024-09-16 RX ORDER — INSULIN LISPRO 100 [IU]/ML
0-10 INJECTION, SOLUTION INTRAVENOUS; SUBCUTANEOUS EVERY 6 HOURS
Status: DISCONTINUED | OUTPATIENT
Start: 2024-09-16 | End: 2024-09-17

## 2024-09-16 RX ORDER — ONDANSETRON HYDROCHLORIDE 2 MG/ML
4 INJECTION, SOLUTION INTRAVENOUS ONCE
Status: COMPLETED | OUTPATIENT
Start: 2024-09-16 | End: 2024-09-16

## 2024-09-16 RX ORDER — DEXTROSE 50 % IN WATER (D50W) INTRAVENOUS SYRINGE
25
Status: DISCONTINUED | OUTPATIENT
Start: 2024-09-16 | End: 2024-09-18 | Stop reason: HOSPADM

## 2024-09-16 RX ORDER — ATORVASTATIN CALCIUM 40 MG/1
40 TABLET, FILM COATED ORAL NIGHTLY
Status: DISCONTINUED | OUTPATIENT
Start: 2024-09-16 | End: 2024-09-18 | Stop reason: HOSPADM

## 2024-09-16 RX ORDER — ENOXAPARIN SODIUM 100 MG/ML
40 INJECTION SUBCUTANEOUS EVERY 24 HOURS
Status: DISCONTINUED | OUTPATIENT
Start: 2024-09-16 | End: 2024-09-18 | Stop reason: HOSPADM

## 2024-09-16 RX ORDER — GUAIFENESIN 600 MG/1
600 TABLET, EXTENDED RELEASE ORAL EVERY 12 HOURS PRN
Status: DISCONTINUED | OUTPATIENT
Start: 2024-09-16 | End: 2024-09-18 | Stop reason: HOSPADM

## 2024-09-16 RX ORDER — PANTOPRAZOLE SODIUM 40 MG/10ML
40 INJECTION, POWDER, LYOPHILIZED, FOR SOLUTION INTRAVENOUS
Status: DISCONTINUED | OUTPATIENT
Start: 2024-09-17 | End: 2024-09-18 | Stop reason: HOSPADM

## 2024-09-16 RX ORDER — ASPIRIN 81 MG/1
81 TABLET ORAL DAILY
Status: DISCONTINUED | OUTPATIENT
Start: 2024-09-17 | End: 2024-09-18 | Stop reason: HOSPADM

## 2024-09-16 RX ORDER — ACETAMINOPHEN 325 MG/1
650 TABLET ORAL EVERY 4 HOURS PRN
Status: DISCONTINUED | OUTPATIENT
Start: 2024-09-16 | End: 2024-09-18 | Stop reason: HOSPADM

## 2024-09-16 RX ORDER — DEXTROSE 50 % IN WATER (D50W) INTRAVENOUS SYRINGE
12.5
Status: DISCONTINUED | OUTPATIENT
Start: 2024-09-16 | End: 2024-09-18 | Stop reason: HOSPADM

## 2024-09-16 RX ORDER — TALC
3 POWDER (GRAM) TOPICAL NIGHTLY PRN
Status: DISCONTINUED | OUTPATIENT
Start: 2024-09-16 | End: 2024-09-18 | Stop reason: HOSPADM

## 2024-09-16 RX ORDER — PANTOPRAZOLE SODIUM 40 MG/10ML
40 INJECTION, POWDER, LYOPHILIZED, FOR SOLUTION INTRAVENOUS DAILY
Status: DISCONTINUED | OUTPATIENT
Start: 2024-09-16 | End: 2024-09-18 | Stop reason: HOSPADM

## 2024-09-16 RX ORDER — POLYETHYLENE GLYCOL 3350 17 G/17G
17 POWDER, FOR SOLUTION ORAL DAILY
Status: DISCONTINUED | OUTPATIENT
Start: 2024-09-17 | End: 2024-09-18 | Stop reason: HOSPADM

## 2024-09-16 ASSESSMENT — COLUMBIA-SUICIDE SEVERITY RATING SCALE - C-SSRS
1. IN THE PAST MONTH, HAVE YOU WISHED YOU WERE DEAD OR WISHED YOU COULD GO TO SLEEP AND NOT WAKE UP?: NO
2. HAVE YOU ACTUALLY HAD ANY THOUGHTS OF KILLING YOURSELF?: NO
6. HAVE YOU EVER DONE ANYTHING, STARTED TO DO ANYTHING, OR PREPARED TO DO ANYTHING TO END YOUR LIFE?: NO

## 2024-09-16 ASSESSMENT — LIFESTYLE VARIABLES
HAVE PEOPLE ANNOYED YOU BY CRITICIZING YOUR DRINKING: NO
TOTAL SCORE: 0
EVER HAD A DRINK FIRST THING IN THE MORNING TO STEADY YOUR NERVES TO GET RID OF A HANGOVER: NO
EVER FELT BAD OR GUILTY ABOUT YOUR DRINKING: NO
HAVE YOU EVER FELT YOU SHOULD CUT DOWN ON YOUR DRINKING: NO

## 2024-09-16 ASSESSMENT — PAIN SCALES - GENERAL: PAINLEVEL_OUTOF10: 5 - MODERATE PAIN

## 2024-09-16 ASSESSMENT — PAIN - FUNCTIONAL ASSESSMENT: PAIN_FUNCTIONAL_ASSESSMENT: 0-10

## 2024-09-16 NOTE — ED TRIAGE NOTES
Pt to ed via private vehicle c/o abdominal pain, diarrhea, black stools, syncopal episodes. Pt states here recently and diagnosed with inflammation in colon, given medications with no relief. Pt states syncopal episodes started over the weekend, denies injury from them.

## 2024-09-16 NOTE — ED PROVIDER NOTES
HPI   Chief Complaint   Patient presents with    Syncope    Abdominal Pain       Patient presents emergency department secondary to abdominal pain and syncope.  Patient has had abdominal pain for the past 3 to 4 weeks.  He has been seen here multiple times for similar symptoms.  He has pain with any type of eating or drinking.  He has not been eating or drinking because of this.  He is hypotensive today with systolics in the 80s.  No nausea or vomiting but continuous diarrhea for the past 3 weeks.  He is going 2 or 3 times a day.  Sometimes it is dark.  He has been seen multiple times in the emergency department had CT scan with IV contrast but did not have CT angiograms performed.  Patient has not been hypotensive before.  No chest pain or shortness of breath.  Pain does not radiate.  It does not go into the back.  It is not sharp.  It is worsened with eating and drinking.  No extremity pain or swelling.  Over the last few days he is experienced multiple episodes of syncope.  He has not completely fallen because his wife has been able to catch him.  They called his cardiologist office who recommended he come to the emergency department to be seen again with the symptoms.                          Sudhir Coma Scale Score: 15                  Patient History   Past Medical History:   Diagnosis Date    Diabetes mellitus (Multi)     Personal history of other diseases of the circulatory system 10/16/2020    History of heart disease    Personal history of other diseases of the circulatory system 10/16/2020    History of hypertension     Past Surgical History:   Procedure Laterality Date    BACK SURGERY  07/14/2017    Back Surgery    CARPAL TUNNEL RELEASE  07/14/2017    Neuroplasty Median Nerve At Carpal Tunnel    CORONARY ANGIOPLASTY  07/14/2017    PTCA    GASTRIC BYPASS  07/14/2017    Gastric Surgery For Morbid Obesity Gastric Bypass     Family History   Problem Relation Name Age of Onset    Coronary artery disease  Mother      Coronary artery disease Father      Coronary artery disease Brother       Social History     Tobacco Use    Smoking status: Former     Current packs/day: 0.00     Types: Cigarettes     Quit date:      Years since quittin.7    Smokeless tobacco: Former   Vaping Use    Vaping status: Never Used   Substance Use Topics    Alcohol use: Not Currently    Drug use: Never       Physical Exam   ED Triage Vitals [24 1851]   Temperature Heart Rate Respirations BP   36.9 °C (98.5 °F) 66 18 88/58      Pulse Ox Temp Source Heart Rate Source Patient Position   98 % Temporal Monitor Sitting      BP Location FiO2 (%)     Right arm --       Physical Exam  Vitals and nursing note reviewed.   Constitutional:       Appearance: Normal appearance. He is not ill-appearing.   HENT:      Head: Normocephalic.      Right Ear: Tympanic membrane normal.      Left Ear: Tympanic membrane normal.      Nose: Nose normal.      Mouth/Throat:      Mouth: Mucous membranes are moist.   Eyes:      Extraocular Movements: Extraocular movements intact.      Pupils: Pupils are equal, round, and reactive to light.   Cardiovascular:      Rate and Rhythm: Normal rate and regular rhythm.      Pulses: Normal pulses.      Heart sounds: Normal heart sounds.   Pulmonary:      Effort: Pulmonary effort is normal.      Breath sounds: Normal breath sounds. No wheezing, rhonchi or rales.   Abdominal:      General: Abdomen is flat. Bowel sounds are normal. There is no distension.      Palpations: Abdomen is soft. There is no pulsatile mass.      Tenderness: There is abdominal tenderness in the epigastric area and periumbilical area.   Musculoskeletal:         General: Normal range of motion.   Skin:     General: Skin is warm and dry.      Capillary Refill: Capillary refill takes less than 2 seconds.   Neurological:      General: No focal deficit present.      Mental Status: He is alert and oriented to person, place, and time.   Psychiatric:          Mood and Affect: Mood normal.         Behavior: Behavior normal.       Labs Reviewed   CBC WITH AUTO DIFFERENTIAL - Abnormal       Result Value    WBC 7.2      nRBC 0.0      RBC 4.35 (*)     Hemoglobin 12.7 (*)     Hematocrit 38.3 (*)     MCV 88      MCH 29.2      MCHC 33.2      RDW 14.7 (*)     Platelets 140 (*)     Neutrophils % 60.0      Immature Granulocytes %, Automated 0.3      Lymphocytes % 21.2      Monocytes % 7.6      Eosinophils % 9.8      Basophils % 1.1      Neutrophils Absolute 4.32      Immature Granulocytes Absolute, Automated 0.02      Lymphocytes Absolute 1.53      Monocytes Absolute 0.55      Eosinophils Absolute 0.71 (*)     Basophils Absolute 0.08     COMPREHENSIVE METABOLIC PANEL - Abnormal    Glucose 179 (*)     Sodium 133 (*)     Potassium 5.0      Chloride 106      Bicarbonate 18 (*)     Anion Gap 14      Urea Nitrogen 44 (*)     Creatinine 1.57 (*)     eGFR 49 (*)     Calcium 8.3 (*)     Albumin 3.9      Alkaline Phosphatase 61      Total Protein 6.0 (*)     AST 35      Bilirubin, Total 0.3      ALT 60 (*)    URINALYSIS WITH REFLEX CULTURE AND MICROSCOPIC - Abnormal    Color, Urine Light-Yellow      Appearance, Urine Clear      Specific Gravity, Urine 1.024      pH, Urine 5.0      Protein, Urine 10 (TRACE)      Glucose, Urine OVER (4+) (*)     Blood, Urine NEGATIVE      Ketones, Urine NEGATIVE      Bilirubin, Urine NEGATIVE      Urobilinogen, Urine Normal      Nitrite, Urine NEGATIVE      Leukocyte Esterase, Urine NEGATIVE      Narrative:     OVER is reported when the result is greater than the clinically reportable range.   URINALYSIS MICROSCOPIC WITH REFLEX CULTURE - Abnormal    WBC, Urine 1-5      RBC, Urine NONE      Bacteria, Urine 1+ (*)     Mucus, Urine FEW      Hyaline Casts, Urine OCCASIONAL (*)    PROTIME-INR - Normal    Protime 11.2      INR 1.0     TROPONIN I, HIGH SENSITIVITY - Normal    Troponin I, High Sensitivity 3      Narrative:     Less than 99th percentile of normal  range cutoff-  Female and children under 18 years old <14 ng/L; Male <21 ng/L: Negative  Repeat testing should be performed if clinically indicated.     Female and children under 18 years old 14-50 ng/L; Male 21-50 ng/L:  Consistent with possible cardiac damage and possible increased clinical   risk. Serial measurements may help to assess extent of myocardial damage.     >50 ng/L: Consistent with cardiac damage, increased clinical risk and  myocardial infarction. Serial measurements may help assess extent of   myocardial damage.      NOTE: Children less than 1 year old may have higher baseline troponin   levels and results should be interpreted in conjunction with the overall   clinical context.     NOTE: Troponin I testing is performed using a different   testing methodology at [facility] than at other   [facility]. Direct result comparisons should only   be made within the same method.   LIPASE - Normal    Lipase 60      Narrative:     Venipuncture immediately after or during the administration of Metamizole may lead to falsely low results. Testing should be performed immediately prior to Metamizole dosing.   URINALYSIS WITH REFLEX CULTURE AND MICROSCOPIC    Narrative:     The following orders were created for panel order Urinalysis with Reflex Culture and Microscopic.  Procedure                               Abnormality         Status                     ---------                               -----------         ------                     Urinalysis with Reflex C...[[id number]]  Abnormal            Final result               Extra Urine Gray Tube[[id number]]                            In process                   Please view results for these tests on the individual orders.   EXTRA URINE GRAY TUBE   CBC WITH AUTO DIFFERENTIAL   COMPREHENSIVE METABOLIC PANEL   MAGNESIUM     Pain Management Panel           No data to display              CT angio chest abdomen pelvis   Final Result   1. No  thoracic or abdominal aortic aneurysm or acute aortic pathology.        2. Atherosclerotic disease as described above.        3. No other acute process identified in the chest, abdomen or pelvis   on arterial phase imaging.        MACRO:   None.        Signed by: Raymond Rodriguezla 9/16/2024 9:49 PM   Dictation workstation:   [id number]        ED Course & MDM   Diagnoses as of 09/16/24 2338   Dehydration   Syncope, unspecified syncope type   Epigastric pain       Medical Decision Making  Patient presents secondary to abdominal pain and episodes of syncope.  At this time patient has a differential diagnosis of aortic dissection, dehydration, electrolyte abnormality, kidney dysfunction or other acute disease.  Patient is evaluated in the emergency department laboratory workup including CBC CMP lipase and troponin.  EKG is obtained.  CT angiogram of the chest abdomen pelvis is obtained.  Patient was given 1 L normal saline IV fluid bolus.  Patient blood pressure improved with IV fluid bolus.  Systolics are in the low 100s.  Patient CT angiogram shows no evidence of acute vascular abnormality.  Laboratory workup shows acute kidney injury.  At this time patient would benefit from hospitalization for IV fluids and hydration.  Patient should be evaluated by GI because of ongoing symptoms and now with dehydration and syncopal episodes.  Patient is stable for admission at this time.  No evidence of acute bacterial infection.  No evidence of sepsis.        Procedure  ECG 12 lead    Performed by: Blanche Collins MD  Authorized by: Blanche Collins MD    Interpretation:     Details:  EKG was obtained at 7:33 PM.  It is sinus rhythm rate of 62.  No acute ST elevation.  Q waves present in V1 V2.  IL interval is 170 and QTc is 407.       Blanche Collins MD  09/16/24 2331       Blanche Collins MD  09/16/24 2334

## 2024-09-16 NOTE — TELEPHONE ENCOUNTER
RN called patient back at this time regarding wife's phone call in. Pt has been having near syncopal and syncopal episodes at home. PT has been seen in the ER this past months a few times for different issues. Pt has been having memory issues and losing a lot of weight according to wife. RN recommended taking her  to the ER and following up in the office after. Pts wife gave a verbal understanding at this time.

## 2024-09-17 ENCOUNTER — APPOINTMENT (OUTPATIENT)
Dept: RADIOLOGY | Facility: HOSPITAL | Age: 64
End: 2024-09-17
Payer: OTHER GOVERNMENT

## 2024-09-17 PROBLEM — I95.1 ORTHOSTATIC HYPOTENSION: Status: ACTIVE | Noted: 2023-01-27

## 2024-09-17 PROBLEM — E11.40 TYPE 2 DIABETES MELLITUS WITH DIABETIC NEUROPATHY, WITH LONG-TERM CURRENT USE OF INSULIN: Status: ACTIVE | Noted: 2023-10-28

## 2024-09-17 PROBLEM — R10.13 EPIGASTRIC PAIN: Status: ACTIVE | Noted: 2024-09-17

## 2024-09-17 PROBLEM — R19.7 DIARRHEA: Status: ACTIVE | Noted: 2024-09-17

## 2024-09-17 LAB
ALBUMIN SERPL BCP-MCNC: 3.4 G/DL (ref 3.4–5)
ALP SERPL-CCNC: 50 U/L (ref 33–136)
ALT SERPL W P-5'-P-CCNC: 48 U/L (ref 10–52)
ANION GAP SERPL CALC-SCNC: 11 MMOL/L (ref 10–20)
AST SERPL W P-5'-P-CCNC: 29 U/L (ref 9–39)
BASOPHILS # BLD AUTO: 0.07 X10*3/UL (ref 0–0.1)
BASOPHILS NFR BLD AUTO: 1.3 %
BILIRUB SERPL-MCNC: 0.3 MG/DL (ref 0–1.2)
BUN SERPL-MCNC: 37 MG/DL (ref 6–23)
CALCIUM SERPL-MCNC: 8 MG/DL (ref 8.6–10.3)
CHLORIDE SERPL-SCNC: 111 MMOL/L (ref 98–107)
CO2 SERPL-SCNC: 20 MMOL/L (ref 21–32)
CREAT SERPL-MCNC: 1.34 MG/DL (ref 0.5–1.3)
EGFRCR SERPLBLD CKD-EPI 2021: 59 ML/MIN/1.73M*2
EOSINOPHIL # BLD AUTO: 0.64 X10*3/UL (ref 0–0.7)
EOSINOPHIL NFR BLD AUTO: 11.7 %
ERYTHROCYTE [DISTWIDTH] IN BLOOD BY AUTOMATED COUNT: 14.6 % (ref 11.5–14.5)
GLUCOSE BLD MANUAL STRIP-MCNC: 128 MG/DL (ref 74–99)
GLUCOSE BLD MANUAL STRIP-MCNC: 135 MG/DL (ref 74–99)
GLUCOSE BLD MANUAL STRIP-MCNC: 135 MG/DL (ref 74–99)
GLUCOSE BLD MANUAL STRIP-MCNC: 139 MG/DL (ref 74–99)
GLUCOSE SERPL-MCNC: 139 MG/DL (ref 74–99)
HCT VFR BLD AUTO: 35.6 % (ref 41–52)
HGB BLD-MCNC: 11.8 G/DL (ref 13.5–17.5)
HOLD SPECIMEN: NORMAL
IMM GRANULOCYTES # BLD AUTO: 0.01 X10*3/UL (ref 0–0.7)
IMM GRANULOCYTES NFR BLD AUTO: 0.2 % (ref 0–0.9)
LACTATE SERPL-SCNC: 0.6 MMOL/L (ref 0.4–2)
LYMPHOCYTES # BLD AUTO: 2.19 X10*3/UL (ref 1.2–4.8)
LYMPHOCYTES NFR BLD AUTO: 39.9 %
MAGNESIUM SERPL-MCNC: 1.73 MG/DL (ref 1.6–2.4)
MCH RBC QN AUTO: 29.3 PG (ref 26–34)
MCHC RBC AUTO-ENTMCNC: 33.1 G/DL (ref 32–36)
MCV RBC AUTO: 88 FL (ref 80–100)
MONOCYTES # BLD AUTO: 0.41 X10*3/UL (ref 0.1–1)
MONOCYTES NFR BLD AUTO: 7.5 %
NEUTROPHILS # BLD AUTO: 2.17 X10*3/UL (ref 1.2–7.7)
NEUTROPHILS NFR BLD AUTO: 39.4 %
NRBC BLD-RTO: 0 /100 WBCS (ref 0–0)
PLATELET # BLD AUTO: 110 X10*3/UL (ref 150–450)
POTASSIUM SERPL-SCNC: 4.5 MMOL/L (ref 3.5–5.3)
PROT SERPL-MCNC: 5.1 G/DL (ref 6.4–8.2)
RBC # BLD AUTO: 4.03 X10*6/UL (ref 4.5–5.9)
SODIUM SERPL-SCNC: 137 MMOL/L (ref 136–145)
WBC # BLD AUTO: 5.5 X10*3/UL (ref 4.4–11.3)

## 2024-09-17 PROCEDURE — 2500000002 HC RX 250 W HCPCS SELF ADMINISTERED DRUGS (ALT 637 FOR MEDICARE OP, ALT 636 FOR OP/ED): Performed by: STUDENT IN AN ORGANIZED HEALTH CARE EDUCATION/TRAINING PROGRAM

## 2024-09-17 PROCEDURE — 76705 ECHO EXAM OF ABDOMEN: CPT

## 2024-09-17 PROCEDURE — 96376 TX/PRO/DX INJ SAME DRUG ADON: CPT

## 2024-09-17 PROCEDURE — G0378 HOSPITAL OBSERVATION PER HR: HCPCS

## 2024-09-17 PROCEDURE — 85025 COMPLETE CBC W/AUTO DIFF WBC: CPT | Performed by: STUDENT IN AN ORGANIZED HEALTH CARE EDUCATION/TRAINING PROGRAM

## 2024-09-17 PROCEDURE — 97165 OT EVAL LOW COMPLEX 30 MIN: CPT | Mod: GO

## 2024-09-17 PROCEDURE — 2500000004 HC RX 250 GENERAL PHARMACY W/ HCPCS (ALT 636 FOR OP/ED): Performed by: STUDENT IN AN ORGANIZED HEALTH CARE EDUCATION/TRAINING PROGRAM

## 2024-09-17 PROCEDURE — 96372 THER/PROPH/DIAG INJ SC/IM: CPT | Performed by: STUDENT IN AN ORGANIZED HEALTH CARE EDUCATION/TRAINING PROGRAM

## 2024-09-17 PROCEDURE — 97161 PT EVAL LOW COMPLEX 20 MIN: CPT | Mod: GP

## 2024-09-17 PROCEDURE — 80053 COMPREHEN METABOLIC PANEL: CPT | Performed by: STUDENT IN AN ORGANIZED HEALTH CARE EDUCATION/TRAINING PROGRAM

## 2024-09-17 PROCEDURE — 2500000001 HC RX 250 WO HCPCS SELF ADMINISTERED DRUGS (ALT 637 FOR MEDICARE OP): Performed by: STUDENT IN AN ORGANIZED HEALTH CARE EDUCATION/TRAINING PROGRAM

## 2024-09-17 PROCEDURE — 36415 COLL VENOUS BLD VENIPUNCTURE: CPT | Performed by: STUDENT IN AN ORGANIZED HEALTH CARE EDUCATION/TRAINING PROGRAM

## 2024-09-17 PROCEDURE — 99222 1ST HOSP IP/OBS MODERATE 55: CPT | Performed by: INTERNAL MEDICINE

## 2024-09-17 PROCEDURE — 82947 ASSAY GLUCOSE BLOOD QUANT: CPT

## 2024-09-17 PROCEDURE — 96361 HYDRATE IV INFUSION ADD-ON: CPT

## 2024-09-17 PROCEDURE — 36415 COLL VENOUS BLD VENIPUNCTURE: CPT | Performed by: INTERNAL MEDICINE

## 2024-09-17 PROCEDURE — 83605 ASSAY OF LACTIC ACID: CPT | Performed by: INTERNAL MEDICINE

## 2024-09-17 PROCEDURE — 99233 SBSQ HOSP IP/OBS HIGH 50: CPT | Performed by: INTERNAL MEDICINE

## 2024-09-17 PROCEDURE — 83735 ASSAY OF MAGNESIUM: CPT | Performed by: STUDENT IN AN ORGANIZED HEALTH CARE EDUCATION/TRAINING PROGRAM

## 2024-09-17 RX ORDER — BUPROPION HYDROCHLORIDE 150 MG/1
150 TABLET ORAL EVERY MORNING
Status: DISCONTINUED | OUTPATIENT
Start: 2024-09-18 | End: 2024-09-18 | Stop reason: HOSPADM

## 2024-09-17 RX ORDER — LISINOPRIL 20 MG/1
20 TABLET ORAL DAILY
Status: DISCONTINUED | OUTPATIENT
Start: 2024-09-18 | End: 2024-09-18 | Stop reason: HOSPADM

## 2024-09-17 RX ORDER — INSULIN LISPRO 100 [IU]/ML
0-10 INJECTION, SOLUTION INTRAVENOUS; SUBCUTANEOUS
Status: DISCONTINUED | OUTPATIENT
Start: 2024-09-17 | End: 2024-09-18 | Stop reason: HOSPADM

## 2024-09-17 RX ORDER — DULOXETIN HYDROCHLORIDE 30 MG/1
60 CAPSULE, DELAYED RELEASE ORAL DAILY
Status: DISCONTINUED | OUTPATIENT
Start: 2024-09-18 | End: 2024-09-18 | Stop reason: HOSPADM

## 2024-09-17 RX ORDER — PREGABALIN 75 MG/1
150 CAPSULE ORAL 2 TIMES DAILY
Status: DISCONTINUED | OUTPATIENT
Start: 2024-09-17 | End: 2024-09-18 | Stop reason: HOSPADM

## 2024-09-17 RX ORDER — ALLOPURINOL 100 MG/1
300 TABLET ORAL DAILY
Status: DISCONTINUED | OUTPATIENT
Start: 2024-09-18 | End: 2024-09-18 | Stop reason: HOSPADM

## 2024-09-17 RX ORDER — BUPROPION HYDROCHLORIDE 150 MG/1
150 TABLET ORAL EVERY MORNING
COMMUNITY

## 2024-09-17 RX ORDER — TAMSULOSIN HYDROCHLORIDE 0.4 MG/1
0.4 CAPSULE ORAL NIGHTLY
Status: DISCONTINUED | OUTPATIENT
Start: 2024-09-17 | End: 2024-09-18 | Stop reason: HOSPADM

## 2024-09-17 RX ORDER — METOPROLOL TARTRATE 25 MG/1
25 TABLET, FILM COATED ORAL 2 TIMES DAILY
Status: DISCONTINUED | OUTPATIENT
Start: 2024-09-17 | End: 2024-09-18 | Stop reason: HOSPADM

## 2024-09-17 SDOH — ECONOMIC STABILITY: HOUSING INSECURITY: AT ANY TIME IN THE PAST 12 MONTHS, WERE YOU HOMELESS OR LIVING IN A SHELTER (INCLUDING NOW)?: NO

## 2024-09-17 SDOH — SOCIAL STABILITY: SOCIAL INSECURITY: ARE THERE ANY APPARENT SIGNS OF INJURIES/BEHAVIORS THAT COULD BE RELATED TO ABUSE/NEGLECT?: NO

## 2024-09-17 SDOH — ECONOMIC STABILITY: INCOME INSECURITY: IN THE LAST 12 MONTHS, WAS THERE A TIME WHEN YOU WERE NOT ABLE TO PAY THE MORTGAGE OR RENT ON TIME?: NO

## 2024-09-17 SDOH — ECONOMIC STABILITY: HOUSING INSECURITY: IN THE PAST 12 MONTHS, HOW MANY TIMES HAVE YOU MOVED WHERE YOU WERE LIVING?: 0

## 2024-09-17 SDOH — SOCIAL STABILITY: SOCIAL INSECURITY: HAVE YOU HAD ANY THOUGHTS OF HARMING ANYONE ELSE?: NO

## 2024-09-17 SDOH — SOCIAL STABILITY: SOCIAL INSECURITY: DO YOU FEEL UNSAFE GOING BACK TO THE PLACE WHERE YOU ARE LIVING?: NO

## 2024-09-17 SDOH — SOCIAL STABILITY: SOCIAL INSECURITY: HAS ANYONE EVER THREATENED TO HURT YOUR FAMILY OR YOUR PETS?: NO

## 2024-09-17 SDOH — SOCIAL STABILITY: SOCIAL INSECURITY: DO YOU FEEL ANYONE HAS EXPLOITED OR TAKEN ADVANTAGE OF YOU FINANCIALLY OR OF YOUR PERSONAL PROPERTY?: NO

## 2024-09-17 SDOH — SOCIAL STABILITY: SOCIAL INSECURITY: DOES ANYONE TRY TO KEEP YOU FROM HAVING/CONTACTING OTHER FRIENDS OR DOING THINGS OUTSIDE YOUR HOME?: NO

## 2024-09-17 SDOH — SOCIAL STABILITY: SOCIAL INSECURITY: WERE YOU ABLE TO COMPLETE ALL THE BEHAVIORAL HEALTH SCREENINGS?: YES

## 2024-09-17 SDOH — ECONOMIC STABILITY: TRANSPORTATION INSECURITY
IN THE PAST 12 MONTHS, HAS THE LACK OF TRANSPORTATION KEPT YOU FROM MEDICAL APPOINTMENTS OR FROM GETTING MEDICATIONS?: NO

## 2024-09-17 SDOH — SOCIAL STABILITY: SOCIAL INSECURITY: ABUSE: ADULT

## 2024-09-17 SDOH — SOCIAL STABILITY: SOCIAL INSECURITY: HAVE YOU HAD THOUGHTS OF HARMING ANYONE ELSE?: YES

## 2024-09-17 SDOH — SOCIAL STABILITY: SOCIAL INSECURITY: ARE YOU OR HAVE YOU BEEN THREATENED OR ABUSED PHYSICALLY, EMOTIONALLY, OR SEXUALLY BY ANYONE?: NO

## 2024-09-17 SDOH — ECONOMIC STABILITY: TRANSPORTATION INSECURITY
IN THE PAST 12 MONTHS, HAS LACK OF TRANSPORTATION KEPT YOU FROM MEETINGS, WORK, OR FROM GETTING THINGS NEEDED FOR DAILY LIVING?: NO

## 2024-09-17 ASSESSMENT — ENCOUNTER SYMPTOMS
BACK PAIN: 0
PHOTOPHOBIA: 0
ACTIVITY CHANGE: 1
TREMORS: 0
SLEEP DISTURBANCE: 0
LIGHT-HEADEDNESS: 1
FACIAL SWELLING: 0
BLOOD IN STOOL: 0
COLOR CHANGE: 0
PALPITATIONS: 0
NAUSEA: 1
HALLUCINATIONS: 0
HEMATURIA: 0
CHILLS: 0
VOMITING: 0
CHEST TIGHTNESS: 0
SORE THROAT: 0
ABDOMINAL PAIN: 1
JOINT SWELLING: 0
FATIGUE: 1
NECK PAIN: 0
COUGH: 0
DIZZINESS: 1
DIAPHORESIS: 0
DYSURIA: 0
CHOKING: 0
WHEEZING: 0
POLYDIPSIA: 0
CONFUSION: 0
CONSTIPATION: 0
APPETITE CHANGE: 1
WEAKNESS: 1
SHORTNESS OF BREATH: 0
FEVER: 0
AGITATION: 0
WOUND: 0
NERVOUS/ANXIOUS: 0
FATIGUE: 0
DIARRHEA: 1

## 2024-09-17 ASSESSMENT — ACTIVITIES OF DAILY LIVING (ADL)
LACK_OF_TRANSPORTATION: NO
GROOMING: INDEPENDENT
BATHING_ASSISTANCE: MINIMAL
HEARING - LEFT EAR: FUNCTIONAL
ADEQUATE_TO_COMPLETE_ADL: YES
HEARING - RIGHT EAR: FUNCTIONAL
TOILETING: INDEPENDENT
PATIENT'S MEMORY ADEQUATE TO SAFELY COMPLETE DAILY ACTIVITIES?: YES
DRESSING YOURSELF: INDEPENDENT
FEEDING YOURSELF: INDEPENDENT
WALKS IN HOME: INDEPENDENT
JUDGMENT_ADEQUATE_SAFELY_COMPLETE_DAILY_ACTIVITIES: YES
ADL_ASSISTANCE: INDEPENDENT
BATHING: INDEPENDENT

## 2024-09-17 ASSESSMENT — PAIN SCALES - GENERAL
PAINLEVEL_OUTOF10: 0 - NO PAIN
PAINLEVEL_OUTOF10: 5 - MODERATE PAIN
PAINLEVEL_OUTOF10: 0 - NO PAIN

## 2024-09-17 ASSESSMENT — COGNITIVE AND FUNCTIONAL STATUS - GENERAL
MOVING TO AND FROM BED TO CHAIR: A LITTLE
DAILY ACTIVITIY SCORE: 19
DRESSING REGULAR UPPER BODY CLOTHING: A LITTLE
DRESSING REGULAR LOWER BODY CLOTHING: A LITTLE
DAILY ACTIVITIY SCORE: 24
WALKING IN HOSPITAL ROOM: A LITTLE
CLIMB 3 TO 5 STEPS WITH RAILING: A LOT
MOBILITY SCORE: 24
CLIMB 3 TO 5 STEPS WITH RAILING: A LITTLE
HELP NEEDED FOR BATHING: A LITTLE
PERSONAL GROOMING: A LITTLE
STANDING UP FROM CHAIR USING ARMS: A LITTLE
STANDING UP FROM CHAIR USING ARMS: A LITTLE
MOBILITY SCORE: 19
WALKING IN HOSPITAL ROOM: A LITTLE
TOILETING: A LITTLE
DAILY ACTIVITIY SCORE: 24
MOBILITY SCORE: 21

## 2024-09-17 ASSESSMENT — PATIENT HEALTH QUESTIONNAIRE - PHQ9
1. LITTLE INTEREST OR PLEASURE IN DOING THINGS: NOT AT ALL
2. FEELING DOWN, DEPRESSED OR HOPELESS: NOT AT ALL
SUM OF ALL RESPONSES TO PHQ9 QUESTIONS 1 & 2: 0

## 2024-09-17 ASSESSMENT — LIFESTYLE VARIABLES
PRESCIPTION_ABUSE_PAST_12_MONTHS: NO
SKIP TO QUESTIONS 9-10: 0
HOW OFTEN DO YOU HAVE 6 OR MORE DRINKS ON ONE OCCASION: LESS THAN MONTHLY
AUDIT-C TOTAL SCORE: 2
HOW OFTEN DO YOU HAVE A DRINK CONTAINING ALCOHOL: MONTHLY OR LESS
HOW MANY STANDARD DRINKS CONTAINING ALCOHOL DO YOU HAVE ON A TYPICAL DAY: 1 OR 2
AUDIT-C TOTAL SCORE: 2
SUBSTANCE_ABUSE_PAST_12_MONTHS: NO

## 2024-09-17 ASSESSMENT — PAIN - FUNCTIONAL ASSESSMENT
PAIN_FUNCTIONAL_ASSESSMENT: 0-10
PAIN_FUNCTIONAL_ASSESSMENT: 0-10

## 2024-09-17 NOTE — ASSESSMENT & PLAN NOTE
Currently has hypotension 2/2 hypovolemia   -Improving with volume expansion   -Hold home antihypertensives and continue fluids as above

## 2024-09-17 NOTE — ASSESSMENT & PLAN NOTE
Syncope 2/2 orthostasis   -No current CP or anginal symptoms.   -HSTI neg. EKG without acute ischemic changes.   -Check orthostatic vitals  9/17: Suspect secondary to hypovolemia.  Appears to be resolving.

## 2024-09-17 NOTE — ASSESSMENT & PLAN NOTE
IDDM-II c/b peripheral neuropathy  -Continue with SSI as per NPO scale while patient is not willing to take in PO   -Continue with accucheks and hypoglycemic protocol   -Monitor and adjust as needed while admitted

## 2024-09-17 NOTE — PROGRESS NOTES
Left augustina with [facility] in regards to patients request to stay at  [place]. Patient declined transfer to [facility]. TCC following.

## 2024-09-17 NOTE — PROGRESS NOTES
Manish Lance is a 64 y.o. male admitted for Acute kidney injury (CMS-Formerly [facility]). Pharmacy reviewed the patient's [id number] medications and allergies for accuracy.    The list below reflects the PTA list prior to pharmacy medication history. A summary a changes to the PTA medication list has been listed below. Please review each medication in order reconciliation for additional clarification and justification.    [facility] information: [facility] L[facility]     Medications added:  EXCEDRIN [id number] 1 T PRN   WELLBUTRIN XL 150MG 1 QAM     Medications modified:  ALBUTEROL HFA INHALER 1 PUFF Q 4 H PRN --> 1-2 PUFFS QID PRN SOB/WHEEZING   INSULIN GLARGINE TK as DIRECTED --> 29 UNITS QAM   NAPROXEN 375MG - ADDED BID   LYRICA 150MG 1 QAM AND 2 QPM --> 1 C BID     Medications to be removed:  BUPROPION SR 150MG   COLCHICINE 0.6MG   COALCE 100MG   FERROUS SULFATE 325MG   PROSCAR 5MG   MELATONIN 3MG   ROBAXIN 500MG   BRILINTA 90MCG   TRAMADOL 50MG  TRAZODONE 50MG   ZOFRAN ODT 4MG   RAGLAN 10MG   GLIPIZIDE 5MG     Medications of concern:      Prior to Admission Medications   Prescriptions Last Dose Informant Patient Reported? Taking?   DULoxetine (Cymbalta) 60 mg DR capsule   Yes No   Sig: Take 1 capsule (60 mg) by mouth once daily.   acetaminophen (TylenoL) 325 mg tablet   Yes No   Sig: Take 1 tablet (325 mg) by mouth every 6 hours if needed. PATIENT MAY TAKE ONE TO TWO TABS   albuterol (ProAir HFA) 90 mcg/actuation inhaler   Yes No   Sig: Inhale 1 puff every 4 hours if needed.   allopurinol (Zyloprim) 300 mg tablet   Yes No   Sig: Take 1 tablet (300 mg) by mouth once daily.   amLODIPine (Norvasc) 5 mg tablet   Yes No   Sig: Take 1 tablet (5 mg) by mouth once daily.   aspirin 81 mg EC tablet   Yes No   Sig: Take 1 tablet (81 mg) by mouth. CLARIFY DIRECTIONS   atorvastatin (Lipitor) 40 mg tablet   Yes No   Sig: Take 1 tablet (40 mg) by mouth once daily.   buPROPion SR (Wellbutrin SR) 150 mg 12 hr tablet   Yes No   Sig: Take 1  tablet (150 mg) by mouth once daily.   cholecalciferol (Vitamin D-3) 50 MCG (2000 UT) tablet   Yes No   Sig: Take 1 tablet (2,000 Units) by mouth once daily. CLARIFY DIRECTIONS   colchicine 0.6 mg tablet   Yes No   Sig: Take by mouth.   docusate sodium (Colace) 100 mg capsule   Yes No   Sig: Take 1 capsule (100 mg) by mouth 2 times a day.   empagliflozin (Jardiance) 25 mg   Yes No   Sig: Take 1 tablet (25 mg) by mouth. CLARIFY DIRECTIONS   ezetimibe (Zetia) 10 mg tablet   Yes No   Sig: Take 1 tablet (10 mg) by mouth once daily.   ferrous sulfate 325 (65 Fe) MG tablet   Yes No   Sig: Take 1 tablet by mouth 3 times a day.   finasteride (Proscar) 5 mg tablet   No No   Sig: Take 1 tablet (5 mg) by mouth once daily. Do not crush, chew, or split.   fluticasone (Flonase) 50 mcg/actuation nasal spray   Yes No   Sig: Administer 2 sprays into affected nostril(s) once daily at bedtime. CLARIFY DIRECTIONS   glipiZIDE (Glucotrol) 5 mg tablet   Yes No   Sig: Take 1 tablet (5 mg) by mouth once daily.   insulin glargine (Lantus U-100 Insulin) 100 unit/mL injection   Yes No   Si Units 2 times a day as needed (BASED ON BLOOD SUGAR LEVELS).   lisinopril 20 mg tablet   Yes No   Sig: Take 1 tablet (20 mg) by mouth once daily.   melatonin 3 mg capsule   Yes No   Sig: Take by mouth. 1-2 TABLETS NIGHTLY IF NEEDED   metFORMIN (Glucophage) 1,000 mg tablet   Yes No   Sig: Take 1 tablet (1,000 mg) by mouth every 12 hours.   methocarbamol (Robaxin) 500 mg tablet   No No   Sig: Take 1 tablet (500 mg) by mouth 2 times a day for 10 days.   metoclopramide (Reglan) 10 mg tablet   No No   Sig: Take 1 tablet (10 mg) by mouth every 6 hours for 7 days.   metoprolol tartrate (Lopressor) 25 mg tablet   Yes No   Sig: Take 1 tablet (25 mg) by mouth 2 times a day.   naproxen (Naprosyn) 375 mg tablet   Yes No   Sig: Take 1 tablet (375 mg) by mouth.   nitroglycerin (Nitrostat) 0.4 mg SL tablet   Yes No   Sig: Place 1 tablet (0.4 mg) under the tongue every  5 minutes if needed for chest pain (MAX THREE TABS PER EPISODE). PLACE 1 TABLET UNDER THE TONGUE EVERY 5 MINUTES FOR UP TO 3 DOSES AS NEEDED FOR CHEST PAIN.CALL 911 IF PAIN PERSISTS.   omeprazole (PriLOSEC) 20 mg DR capsule   Yes No   Sig: Take 1 capsule (20 mg) by mouth twice a day.   ondansetron ODT (Zofran-ODT) 4 mg disintegrating tablet   No No   Sig: Take 1 tablet (4 mg) by mouth every 8 hours if needed for nausea or vomiting.   pregabalin (Lyrica) 150 mg capsule   Yes No   Sig: Take 1 capsule (150 mg) by mouth once daily in the morning. AND 2 CAPSULE IN THE EVENING   tamsulosin (Flomax) 0.4 mg 24 hr capsule   Yes No   Sig: Take 1 capsule (0.4 mg) by mouth once daily.   ticagrelor (Brilinta) 90 mg tablet   Yes No   Sig: Take 1 tablet (90 mg) by mouth twice a day.   traMADol (Ultram) 50 mg tablet   Yes No   Sig: Take 1 tablet (50 mg) by mouth every 6 hours if needed for severe pain (7 - 10).   traZODone (Desyrel) 50 mg tablet   Yes No   Sig: Take by mouth.      Facility-Administered Medications: None       ANU ONTIVEROS

## 2024-09-17 NOTE — PROGRESS NOTES
Manish Lance is a 64 y.o. male on day 0 of admission presenting with Acute kidney injury (CMS-HCC).    Review of Systems   Constitutional:  Positive for activity change and appetite change. Negative for chills, diaphoresis, fatigue and fever.   HENT:  Negative for congestion, facial swelling, sneezing and sore throat.    Respiratory:  Negative for cough, choking, chest tightness, shortness of breath and wheezing.    Cardiovascular:  Negative for chest pain, palpitations and leg swelling.   Gastrointestinal:  Positive for abdominal pain, diarrhea and nausea. Negative for constipation and vomiting.   Genitourinary:  Negative for dysuria, hematuria and urgency.   Musculoskeletal:  Negative for back pain, gait problem, joint swelling and neck pain.   Skin:  Negative for rash and wound.   Neurological:  Positive for dizziness, syncope, weakness and light-headedness.   Psychiatric/Behavioral:  Negative for agitation, confusion and hallucinations. The patient is not nervous/anxious.    All other systems reviewed and are negative.     Subjective   Manish Lance is a 64 y.o. male with PMHx s/f HTN, HLD, CAD s/p PCI, aortic stenosis, BHARTI, IDDM-II c/b peripheral neuropathy, anxiety, depression, chronic lower back pain, GERD, and h/o gastric bypass (Billroth II) presenting with ongoing epigastric abdominal pain, poor appetite and diminished intake, and fairly regular episodes of diarrhea (non-bloody, but a few episodes of grayish discoloration). Pt reports that he is tired and does not feel very well, though slightly improved after Protonix injection in the ED, but asks his wife to provide most history. In short, he was seen in the ED about 3 weeks ago and was diagnosed with an infection/colitis, and was given antibiotics. Despite completing tx, still having persistent epigastric pain resulting in poor appetite and not wanting to eat from the pain. He has also not been drinking much. Has had multiple episodes of standing  up and getting lightheaded/dizzy and passing out, but has never fallen because his wife has been able to catch him. Does have a prior history of this as well with both cardiac and orthostatic syncope. He reports no fevers, chills, cp/pressure, palpitations, vomiting, diaphoresis, ha, vision changes, focal and/or lateralizing sensory or motor deficits.      Has previously seen Dr. Marti for EGD/Colonoscopy in setting of unexplained weight loss after a plateau after initial wt loss from his gastric bypass surgery. Mostly recently saw him and had results from EGD/colonoscopy 09/22/23.      ED Course (Summary):   Vitals on presentation: T98.5, BP 88/58 (improved to 108/74 with 2 L of fluid), HR 66, RR 18, SpO2 98% RA  Labs: CBC with WBC 7.2, Hgb 12.7, platelets 140.  CMP with glucose 179, sodium 133, potassium 5.0, BUN 44, serum creatinine 1.57.  Lipase 60.  High-sensitivity troponin 3.  PT/INR: 11.2/1.0.  UA with 1+ bacteria but negative nitrites and leukocyte esterase, only 1-5 WBCs.  Imaging: CTA chest/abdomen/pelvis is negative for overt acute process  Interventions: 2 L normal saline, 4 mg Zofran, 40 mg IV push pantoprazole, admitted to medicine for further management    9/17: Patient seen.  States he can only tolerate clear liquids.  States the eating thing thicker or solid will give him abdominal pain.  This does not necessarily give him diarrhea.  States he is having loose stools once or twice a day, states it appears to be gray in color.  No blood.  No fevers or chills.  States that antibiotics provided by ED did not help at all.  Has had multiple prior stents per cardiology.  Known to GI service.  Await input from GI, check serum lactic acid.  May want to consider mesenteric ischemia.       Objective     Last Recorded Vitals  /70 (BP Location: Right arm, Patient Position: Lying)   Pulse 65   Temp 36.2 °C (97.2 °F) (Temporal)   Resp 16   Wt 74.8 kg (165 lb)   SpO2 97%   Intake/Output last 3  Shifts:  No intake or output data in the 24 hours ending 09/17/24 1032    Admission Weight  Weight: 74.8 kg (165 lb) (09/16/24 1851)    Daily Weight  09/16/24 : 74.8 kg (165 lb)      Physical Exam  Constitutional:       General: He is not in acute distress.  HENT:      Head: Normocephalic and atraumatic.      Nose: Nose normal. No congestion or rhinorrhea.      Mouth/Throat:      Mouth: Mucous membranes are dry.      Pharynx: Oropharynx is clear.   Eyes:      General: No scleral icterus.     Extraocular Movements: Extraocular movements intact.      Pupils: Pupils are equal, round, and reactive to light.   Cardiovascular:      Rate and Rhythm: Normal rate and regular rhythm.      Heart sounds: Normal heart sounds. No murmur heard.     No friction rub. No gallop.   Pulmonary:      Effort: Pulmonary effort is normal.      Breath sounds: Normal breath sounds. No wheezing, rhonchi or rales.   Chest:      Chest wall: No tenderness.   Abdominal:      General: There is no distension.      Palpations: Abdomen is soft.      Tenderness: There is no abdominal tenderness. There is no guarding or rebound.   Musculoskeletal:         General: No swelling, tenderness or signs of injury. Normal range of motion.      Cervical back: Normal range of motion.   Skin:     General: Skin is warm and dry.      Coloration: Skin is not jaundiced.      Findings: No bruising, erythema or rash.   Neurological:      General: No focal deficit present.      Mental Status: He is alert and oriented to person, place, and time.      Cranial Nerves: No cranial nerve deficit.      Sensory: No sensory deficit.      Coordination: Coordination normal.      Gait: Gait normal.      Deep Tendon Reflexes: Reflexes normal.          Lab Results  Results for orders placed or performed during the hospital encounter of 09/16/24 (from the past 24 hour(s))   CBC and Auto Differential   Result Value Ref Range    WBC 7.2 4.4 - 11.3 x10*3/uL    nRBC 0.0 0.0 - 0.0 /100 WBCs     RBC 4.35 (L) 4.50 - 5.90 x10*6/uL    Hemoglobin 12.7 (L) 13.5 - 17.5 g/dL    Hematocrit 38.3 (L) 41.0 - 52.0 %    MCV 88 80 - 100 fL    MCH 29.2 26.0 - 34.0 pg    MCHC 33.2 32.0 - 36.0 g/dL    RDW 14.7 (H) 11.5 - 14.5 %    Platelets 140 (L) 150 - 450 x10*3/uL    Neutrophils % 60.0 40.0 - 80.0 %    Immature Granulocytes %, Automated 0.3 0.0 - 0.9 %    Lymphocytes % 21.2 13.0 - 44.0 %    Monocytes % 7.6 2.0 - 10.0 %    Eosinophils % 9.8 0.0 - 6.0 %    Basophils % 1.1 0.0 - 2.0 %    Neutrophils Absolute 4.32 1.20 - 7.70 x10*3/uL    Immature Granulocytes Absolute, Automated 0.02 0.00 - 0.70 x10*3/uL    Lymphocytes Absolute 1.53 1.20 - 4.80 x10*3/uL    Monocytes Absolute 0.55 0.10 - 1.00 x10*3/uL    Eosinophils Absolute 0.71 (H) 0.00 - 0.70 x10*3/uL    Basophils Absolute 0.08 0.00 - 0.10 x10*3/uL   Comprehensive metabolic panel   Result Value Ref Range    Glucose 179 (H) 74 - 99 mg/dL    Sodium 133 (L) 136 - 145 mmol/L    Potassium 5.0 3.5 - 5.3 mmol/L    Chloride 106 98 - 107 mmol/L    Bicarbonate 18 (L) 21 - 32 mmol/L    Anion Gap 14 10 - 20 mmol/L    Urea Nitrogen 44 (H) 6 - 23 mg/dL    Creatinine 1.57 (H) 0.50 - 1.30 mg/dL    eGFR 49 (L) >60 mL/min/1.73m*2    Calcium 8.3 (L) 8.6 - 10.3 mg/dL    Albumin 3.9 3.4 - 5.0 g/dL    Alkaline Phosphatase 61 33 - 136 U/L    Total Protein 6.0 (L) 6.4 - 8.2 g/dL    AST 35 9 - 39 U/L    Bilirubin, Total 0.3 0.0 - 1.2 mg/dL    ALT 60 (H) 10 - 52 U/L   Protime-INR   Result Value Ref Range    Protime 11.2 9.8 - 12.8 seconds    INR 1.0 0.9 - 1.1   Troponin I, High Sensitivity   Result Value Ref Range    Troponin I, High Sensitivity 3 0 - 20 ng/L   Lipase   Result Value Ref Range    Lipase 60 9 - 82 U/L   Urinalysis with Reflex Culture and Microscopic   Result Value Ref Range    Color, Urine Light-Yellow Light-Yellow, Yellow, Dark-Yellow    Appearance, Urine Clear Clear    Specific Gravity, Urine 1.024 1.005 - 1.035    pH, Urine 5.0 5.0, 5.5, 6.0, 6.5, 7.0, 7.5, 8.0    Protein,  Urine 10 (TRACE) NEGATIVE, 10 (TRACE), 20 (TRACE) mg/dL    Glucose, Urine OVER (4+) (A) Normal mg/dL    Blood, Urine NEGATIVE NEGATIVE    Ketones, Urine NEGATIVE NEGATIVE mg/dL    Bilirubin, Urine NEGATIVE NEGATIVE    Urobilinogen, Urine Normal Normal mg/dL    Nitrite, Urine NEGATIVE NEGATIVE    Leukocyte Esterase, Urine NEGATIVE NEGATIVE   Extra Urine Gray Tube   Result Value Ref Range    Extra Tube Hold for add-ons.    Urinalysis Microscopic   Result Value Ref Range    WBC, Urine 1-5 1-5, NONE /HPF    RBC, Urine NONE NONE, 1-2, 3-5 /HPF    Bacteria, Urine 1+ (A) NONE SEEN /HPF    Mucus, Urine FEW Reference range not established. /LPF    Hyaline Casts, Urine OCCASIONAL (A) NONE /LPF   CBC and Auto Differential   Result Value Ref Range    WBC 5.5 4.4 - 11.3 x10*3/uL    nRBC 0.0 0.0 - 0.0 /100 WBCs    RBC 4.03 (L) 4.50 - 5.90 x10*6/uL    Hemoglobin 11.8 (L) 13.5 - 17.5 g/dL    Hematocrit 35.6 (L) 41.0 - 52.0 %    MCV 88 80 - 100 fL    MCH 29.3 26.0 - 34.0 pg    MCHC 33.1 32.0 - 36.0 g/dL    RDW 14.6 (H) 11.5 - 14.5 %    Platelets 110 (L) 150 - 450 x10*3/uL    Neutrophils % 39.4 40.0 - 80.0 %    Immature Granulocytes %, Automated 0.2 0.0 - 0.9 %    Lymphocytes % 39.9 13.0 - 44.0 %    Monocytes % 7.5 2.0 - 10.0 %    Eosinophils % 11.7 0.0 - 6.0 %    Basophils % 1.3 0.0 - 2.0 %    Neutrophils Absolute 2.17 1.20 - 7.70 x10*3/uL    Immature Granulocytes Absolute, Automated 0.01 0.00 - 0.70 x10*3/uL    Lymphocytes Absolute 2.19 1.20 - 4.80 x10*3/uL    Monocytes Absolute 0.41 0.10 - 1.00 x10*3/uL    Eosinophils Absolute 0.64 0.00 - 0.70 x10*3/uL    Basophils Absolute 0.07 0.00 - 0.10 x10*3/uL   Comprehensive Metabolic Panel   Result Value Ref Range    Glucose 139 (H) 74 - 99 mg/dL    Sodium 137 136 - 145 mmol/L    Potassium 4.5 3.5 - 5.3 mmol/L    Chloride 111 (H) 98 - 107 mmol/L    Bicarbonate 20 (L) 21 - 32 mmol/L    Anion Gap 11 10 - 20 mmol/L    Urea Nitrogen 37 (H) 6 - 23 mg/dL    Creatinine 1.34 (H) 0.50 - 1.30 mg/dL     eGFR 59 (L) >60 mL/min/1.73m*2    Calcium 8.0 (L) 8.6 - 10.3 mg/dL    Albumin 3.4 3.4 - 5.0 g/dL    Alkaline Phosphatase 50 33 - 136 U/L    Total Protein 5.1 (L) 6.4 - 8.2 g/dL    AST 29 9 - 39 U/L    Bilirubin, Total 0.3 0.0 - 1.2 mg/dL    ALT 48 10 - 52 U/L   Magnesium   Result Value Ref Range    Magnesium 1.73 1.60 - 2.40 mg/dL   POCT GLUCOSE   Result Value Ref Range    POCT Glucose 139 (H) 74 - 99 mg/dL        Image Results  CT angio chest abdomen pelvis  Narrative: Interpreted By:  Raymond Guajardo,   STUDY:  CT ANGIO CHEST ABDOMEN PELVIS;  9/16/2024 9:02 pm      INDICATION:  Signs/Symptoms:abdominal pain, hypotension.      COMPARISON:  08/22/2024      ACCESSION NUMBER(S):  [id number]      ORDERING CLINICIAN:  MARC PEDRAZA      TECHNIQUE:  Axial non-contrast images of the chest abdomen, and pelvis.      Axial CT images of the chest, abdomen and pelvis were obtained after  the intravenous administration of iodinated contrast using  angiographic technique with coronal and sagittal reformatted images.  MIP images and 3D reconstructions were created on an independent  workstation and reviewed.      FINDINGS:  VASCULATURE:      Thoracic and abdominal aorta intact without aneurysm or dissection.      Mild calcification origin of the great vessels which are otherwise  widely patent.      Celiac artery is widely patent. Mild-to-moderate calcification origin  of the SMA without significant stenosis.      Moderate calcification involving the origin of the renal arteries,  left-greater-than-right with mild stenosis.      QUYNH is patent.      Bilateral iliac arteries are patent without significant stenosis.      Moderate atherosclerotic disease seen involving both common femoral  arteries with areas of mild-to-moderate stenosis.      CT CHEST:      Heart size within normal limits. Severe coronary artery  calcifications are present.      No significant adenopathy in the chest.      No effusions.      Minimal  dependent atelectasis. Lungs are clear without consolidation.      CT ABDOMEN/PELVIS:      Arterial phase imaging of the liver, gallbladder, adrenals, pancreas  and spleen grossly unremarkable.      Gastric bypass changes are seen.      Kidneys intact without significant hydronephrosis or hydroureter. The  bladder is within normal limits.      No bowel obstruction.      Normal appearing appendix.      No definite evidence of colitis      No free fluid or significant adenopathy.      There is mild-to-moderate spondylotic degeneration seen axial  skeleton.      Impression: 1. No thoracic or abdominal aortic aneurysm or acute aortic pathology.      2. Atherosclerotic disease as described above.      3. No other acute process identified in the chest, abdomen or pelvis  on arterial phase imaging.      MACRO:  None.      Signed by: Raymond Guajardo 9/16/2024 9:49 PM  Dictation workstation:   [id number]       Assessment/Plan     Epigastric pain  Assessment & Plan  -No acute etiologies evident on CTA chest/abdomen/pelvis  -No systemic infectious symptoms reported per patient or wife  -Will check stool pathogen panel PCR in addition to C. difficile PCR; with episodic gray-colored stool may consider additional evaluation such as hepatitis panel but will defer to GI  -Continue supportive care and fluid resuscitation  -GI consultation appreciated  9/17: States he has pain whenever he eats solid foods or thickened liquids.  Can only tolerate thin liquids.  This does not appear to be associated with loose stools.  Having about 1-2 gray-colored stools per day, states they are watery.  Await input from GI.  Stool studies have been sent but he has had no BM since admitted.    * Acute kidney injury (CMS-HCC)  Assessment & Plan  Acute Kidney Injury with Dehydration  Mild metabolic acidosis, likely d/t CRISSY + diarrhea   Mild hypovolemic hyponatremia  -Baseline serum creatinine: ~1.1  -Creatinine at admission: 1,57 with BUN 44  -Suspect  secondary to GI losses, diminished intake, hypotension 2/2 hypovolemia, +/- taking home antihypertensives   -IVF: s/p 2L NS in ED. Continue LR @ 75cc/hr overnight  -Na 135 when accounting for glucose (mild hypovolemic hyponatremia on admit)   -Monitor UOP. Hold home antihypertensives.   -Recheck renal function panel in AM   -Nephrology consultation if not improving  9/17: Creatinine is improving.  Suspect hypovolemia.    Anxiety and depression  Assessment & Plan  -Confirm and resume home medications    Type 2 diabetes mellitus with diabetic neuropathy, with long-term current use of insulin (Multi)  Assessment & Plan  IDDM-II c/b peripheral neuropathy  -Continue with SSI as per NPO scale while patient is not willing to take in PO   -Continue with accucheks and hypoglycemic protocol   -Monitor and adjust as needed while admitted     Orthostatic hypotension  Assessment & Plan  Syncope 2/2 orthostasis   -No current CP or anginal symptoms.   -HSTI neg. EKG without acute ischemic changes.   -Check orthostatic vitals  9/17: Suspect secondary to hypovolemia.  Appears to be resolving.    H/O gastric bypass  Assessment & Plan  Will continue to monitor.    Hyperlipidemia  Assessment & Plan  Continue home meds, adjust as needed.    CAD in native artery  Assessment & Plan  Continue home meds, adjust as needed.    Hypertension  Assessment & Plan  Currently has hypotension 2/2 hypovolemia   -Improving with volume expansion   -Hold home antihypertensives and continue fluids as above                  Armando Mathis MD

## 2024-09-17 NOTE — PROGRESS NOTES
Occupational Therapy  Evaluation    Patient Name: Manish Lance  MRN: [id number]  Today's Date: 9/17/2024  Time Calculation  Start Time: 1354  Stop Time: 1408  Time Calculation (min): 14 min    Current Problem:   1. Dehydration    2. Syncope, unspecified syncope type    3. Epigastric pain        OT order: OT eval and treat   Referred by: Chasity  Reason for referral: ADLs, safety assessment  Past medical history related to rehab:  has a past medical history of Diabetes mellitus (Multi), Personal history of other diseases of the circulatory system (10/16/2020), and Personal history of other diseases of the circulatory system (10/16/2020).     Precautions:   Medical Precautions: No known precautions/limitation    ASSESSMENT  OT Assessment: OT eval completed. The patient is functioning close to baseline for ADLs. able to perform with SBA. OT provided education on ADL safety, pt verbalized good understanding. no further skilled OT needs. DC.     Prognosis:    Barriers to discharge: None  Tolerance:      PLAN  Frequency: OT eval only  Treatment Interventions:    Discharge Recommendations: No further acute OT, No OT needed after discharge  OT OK to discharge: Yes    GENERAL VISIT INFORMATION   Start of session communication:    End of session communication: Bedside nurse  Family/caregiver present: Yes  Caregiver feedback: wife and grandson present  Co-Treatment: PT  Reason for co-treatment: to optimize safety and mobility, while focusing on discipline specific goals   Position Pt Received:  Bed, 3 rail up, Alarm off, not on at start of session  End of session position: Bed, 3 rail up, Alarm off, not on at start of session    SUBJECTIVE  Home Living:  Type of Home: House  Lives With: Spouse, Grandchildren  Home Adaptive Equipment: None  Home Layout: Two level, 1/2 bath on main level, Bed/bath upstairs  Home Access: Stairs to enter with rails  Entrance Stairs-Number of Steps: 2     Prior Level of Function:  Receives  Help From: Family  ADL Assistance: Independent  Homemaking Assistance: Independent  Ambulatory Assistance: Independent  Vocational: Part time employment (retired from [employer]. 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)  Prior Function Comments: +drives    IADL History:       Pain:  Assessment: 0-10  Score: 5 - Moderate pain  Type: Acute pain  Location: Abdomen  Interventions: Ambulation/increased activity  Response to pain interventions:      OBJECTIVE  Vital Signs:       Cognition:  Overall Cognitive Status: Within Functional Limits  Orientation Level: Oriented X4             Current ADL function:   EATING:  Stand by     GROOMING: Stand by     BATHING: Minimal     UB DRESSING: Stand by     LB DRESSING: Stand by     TOILETING: Stand by    ADL comments:       Activity Tolerance:  Endurance: Endurance does not limit participation in activity    Bed Mobility/Transfers:   Bed Mobility  Bed Mobility: Yes  Bed Mobility 1  Bed Mobility 1: Supine to sitting, Sitting to supine  Level of Assistance 1: Independent  Transfers  Transfer:  (sit<>stand SBA)    Ambulation/Gait Training:  Functional Mobility  Functional Mobility Performed:  (pt performed functional mobility in room and hallway with CGAX1)    Sitting Balance:  Static Sitting Balance  Static Sitting-Level of Assistance: Close supervision  Dynamic Sitting Balance  Dynamic Sitting-Level of Assistance: Close supervision    Standing Balance:  Static Standing Balance  Static Standing-Level of Assistance: Contact guard  Dynamic Standing Balance  Dynamic Standing-Level of Assistance: Contact guard    Vision: Vision - Basic Assessment  Current Vision: No visual deficits   and      Sensation:  Light Touch: Partial deficits in the RUE, Partial deficits in the LUE, Partial deficits in the RLE, Partial deficits in the LLE    Strength:  Strength Comments: BUE grossly WFL    Perception:  Inattention/Neglect:  Appears intact    Coordination:  Movements are Fluid and Coordinated: Yes     Hand Function:  Hand Function  Gross Grasp: Functional    Extremities: RUE   RUE : Within Functional Limits and LUE   LUE: Within Functional Limits    Outcome Measures: [facility] Daily Activity   Putting on and taking off regular lower body clothing: A little  Bathing (including washing, rinsing, drying): A little  Putting on and taking off regular upper body clothing: A little  Toileting, which includes using toilet, bedpan or urinal: A little  Taking care of personal grooming such as brushing teeth: A little   Eating Meals: None   Daily Activity - Total Score: 19    EDUCATION:     Education Documentation  ADL Training, taught by Jacquie Strauss OT at 9/17/2024  3:18 PM.  Learner: Patient  Readiness: Acceptance  Method: Explanation  Response: Verbalizes Understanding    Education Comments  No comments found.

## 2024-09-17 NOTE — CONSULTS
"Inpatient consult to gastroenterology  Consult performed by: Arsen Wilson DO  Consult ordered by: Lynn Gaspar PA-C       [facility] Consultation Note    ASSESSMENT and PLAN:       Manish Lance is a 64 y.o. male with a significant past medical history of coronary artery disease status post PCI on Brilinta, that is mellitus type II, show sleep apnea, AV stenosis, send hypertension, hyperlipidemia and depression who presented with dehydration weakness syncopal-like episode. GI was consulted for \" epigastric pain.\".       Epigastric pain  -Patient with underwent EGD and colonoscopy 9/2023 with patent Billroth II gastrojejunostomy healthy-appearing mucosa mild erythema friability was found in the cardia biopsy showed gastric mucosa with change consistent with reactive gastropathy  -CT abdomen pelvis from 8/19 slowed diffuse wall thickening edema small bowel loops and edema in the abdominal and pelvic fat second enteritis infectious versus inflammatory process  -CT abdomen pelvis from 8/22 showed persistent mild wall thickening enhancement of the nondistended small bowel in the abdomen suggestive resolving enterocolitis possible minimal wall thickening in the ascending colon  -CTA done admission did not show any acute pathology, no signs of stenosis of the SMA or celiac artery  -Patient's presentation possible postinfectious functional dyspepsia  -Will obtain right upper quadrant ultrasound to rule out biliary etiology  -Obtain CRP  -Obtain fecal calprotectin patient has loose bowel movement evaluate for possible but seems unlikely small bowel Crohn's  - okay to advance diet as tolerated  -No plans for endoscopic evaluation at this time unlikely to  as patient has extensive workup in the outpatient setting along with EGD and colonoscopy one 1 year prior that were both within normal limits so patient currently on Brilinta states last dose was 9/16      Arsen Wilson " "DO   Gastroenterology         HISTORY OF PRESENT ILLNESS:       History Of Present Illness:      Manish Lance is a 64 y.o. male with a significant past medical history of coronary artery disease status post PCI on Brilinta, that is mellitus type II, show sleep apnea, AV stenosis, send hypertension, hyperlipidemia and depression who presented with dehydration weakness syncopal-like episode. GI was consulted for \" epigastric pain.\".  Patient states he is an ongoing abdominal pain for the last 3 months.  Patient's been seen in the ER twice in August within 3 days.  On the original presentation to the emergency room on 8/19 CT showed diffuse wall thickening and edema of the small bowel loops consistent with possible enteritis infection versus inflammatory.  Patient states that since then he has had ongoing epigastric pain with some right upper quadrant pain and decreased oral intake from this pain.  He mitts to some intermittent loose stools however no overt diarrhea no blood in his stool.  Patient states his main symptom is pain he denies any dysphagia.  Denies any reflux.    Endoscopic History     Colonoscopy within limits of the bowel prep quality normal mucosa was found in entire colon  EGD 9/2023 with patent Billroth II healthy-appearing mucosa friable mucosa in the cardia biopsies negative for H. pylori  Review of systems:     Patient denies any heartburn/GERD, N/V, dysphagia, odynophagia,diarrhea, constipation, hematemesis, hematochezia, melena, or weight loss.    I performed a complete 10 point review of systems and it is negative except as noted in HPI or above.    All other systems have been reviewed and are negative.    Objective         PAST HISTORIES:       Past Medical History:  He has a past medical history of Diabetes mellitus (Multi), Personal history of other diseases of the circulatory system (10/16/2020), and Personal history of other diseases of the circulatory system (10/16/2020).    Past " "Surgical History:  He has a past surgical history that includes Coronary angioplasty (07/14/2017); Gastric bypass (07/14/2017); Back surgery (07/14/2017); and Carpal tunnel release (07/14/2017).      Social History:  He reports that he quit smoking about 29 years ago. His smoking use included cigarettes. He has quit using smokeless tobacco. He reports that he does not currently use alcohol. He reports that he does not use drugs.    Family History:  No known GI disease, specifically denies pancreatitis, Crohn's, colon cancer, gastroesophageal cancer, or ulcerative colitis.    Family History   Problem Relation Name Age of Onset    Coronary artery disease Mother      Coronary artery disease Father      Coronary artery disease Brother          Allergies:  Patient has no known allergies.      OBJECTIVE:       Last Recorded Vitals:  Vitals:    09/17/24 0000 09/17/24 0400 09/17/24 0734 09/17/24 0843   BP: 108/74 95/66 104/64 115/70   BP Location:    Right arm   Patient Position:    Lying   Pulse: 63 61 73 65   Resp: 20 12 16 16   Temp:   36.3 °C (97.4 °F) 36.2 °C (97.2 °F)   TempSrc:   Temporal Temporal   SpO2: 95% 96% 96% 97%   Weight:       Height:         /70 (BP Location: Right arm, Patient Position: Lying)   Pulse 65   Temp 36.2 °C (97.2 °F) (Temporal)   Resp 16   Ht 1.778 m (5' 10\")   Wt 74.8 kg (165 lb)   SpO2 97%   BMI 23.68 kg/m²      Physical Exam:    Physical Exam  Constitutional:       Appearance: Normal appearance.   HENT:      Mouth/Throat:      Mouth: Mucous membranes are moist.   Eyes:      Extraocular Movements: Extraocular movements intact.   Cardiovascular:      Rate and Rhythm: Normal rate and regular rhythm.      Heart sounds: Normal heart sounds.   Pulmonary:      Effort: Pulmonary effort is normal. No respiratory distress.      Breath sounds: Normal breath sounds. No wheezing.   Abdominal:      General: Abdomen is flat. Bowel sounds are normal. There is no distension.      Palpations: " Abdomen is soft.      Tenderness: There is abdominal tenderness. There is no rebound.   Musculoskeletal:         General: Normal range of motion.   Skin:     General: Skin is warm and dry.   Neurological:      General: No focal deficit present.      Mental Status: He is alert and oriented to person, place, and time. Mental status is at baseline.   Psychiatric:         Mood and Affect: Mood normal.         Behavior: Behavior normal.             Inpatient Medications:  aspirin, 81 mg, oral, Daily  atorvastatin, 40 mg, oral, Nightly  enoxaparin, 40 mg, subcutaneous, q24h  insulin lispro, 0-10 Units, subcutaneous, q6h  pantoprazole, 40 mg, oral, Daily before breakfast   Or  pantoprazole, 40 mg, intravenous, Daily before breakfast  pantoprazole, 40 mg, intravenous, Daily  polyethylene glycol, 17 g, oral, Daily      PRN medications: acetaminophen, albuterol, bisacodyl, bisacodyl, dextrose, dextrose, glucagon, glucagon, guaiFENesin, melatonin, ondansetron **OR** ondansetron, promethazine    Outpatient Medications:  Prior to Admission medications    Medication Sig Start Date End Date Taking? Authorizing Provider   acetaminophen (TylenoL) 325 mg tablet Take 1 tablet (325 mg) by mouth every 6 hours if needed. PATIENT MAY TAKE ONE TO TWO TABS 7/14/17   Historical Provider, MD   albuterol (ProAir HFA) 90 mcg/actuation inhaler Inhale 1 puff every 4 hours if needed. 7/14/17   Historical Provider, MD   allopurinol (Zyloprim) 300 mg tablet Take 1 tablet (300 mg) by mouth once daily. 4/1/19   Historical Provider, MD   amLODIPine (Norvasc) 5 mg tablet Take 1 tablet (5 mg) by mouth once daily. 10/12/22   Historical Provider, MD   aspirin 81 mg EC tablet Take 1 tablet (81 mg) by mouth. CLARIFY DIRECTIONS    Historical Provider, MD   atorvastatin (Lipitor) 40 mg tablet Take 1 tablet (40 mg) by mouth once daily. 7/6/17   Historical Provider, MD   buPROPion SR (Wellbutrin SR) 150 mg 12 hr tablet Take 1 tablet (150 mg) by mouth once daily.  10/12/22   Historical Provider, MD   cholecalciferol (Vitamin D-3) 50 MCG (2000 UT) tablet Take 1 tablet (2,000 Units) by mouth once daily. CLARIFY DIRECTIONS 10/12/22   Historical Provider, MD   colchicine 0.6 mg tablet Take by mouth.    Historical Provider, MD   docusate sodium (Colace) 100 mg capsule Take 1 capsule (100 mg) by mouth 2 times a day.    Historical Provider, MD   DULoxetine (Cymbalta) 60 mg DR capsule Take 1 capsule (60 mg) by mouth once daily. 10/12/22   Historical Provider, MD   empagliflozin (Jardiance) 25 mg Take 1 tablet (25 mg) by mouth. CLARIFY DIRECTIONS 10/12/22   Historical Provider, MD   ezetimibe (Zetia) 10 mg tablet Take 1 tablet (10 mg) by mouth once daily. 1/21/21   Historical Provider, MD   ferrous sulfate 325 (65 Fe) MG tablet Take 1 tablet by mouth 3 times a day. 10/12/22   Historical Provider, MD   finasteride (Proscar) 5 mg tablet Take 1 tablet (5 mg) by mouth once daily. Do not crush, chew, or split. 10/28/23 10/27/24  Panchito Ochoa MD PhD   fluticasone (Flonase) 50 mcg/actuation nasal spray Administer 2 sprays into affected nostril(s) once daily at bedtime. CLARIFY DIRECTIONS 10/12/22   Historical Provider, MD   glipiZIDE (Glucotrol) 5 mg tablet Take 1 tablet (5 mg) by mouth once daily.    Historical Provider, MD   insulin glargine (Lantus U-100 Insulin) 100 unit/mL injection 26 Units 2 times a day as needed (BASED ON BLOOD SUGAR LEVELS).    Historical Provider, MD   lisinopril 20 mg tablet Take 1 tablet (20 mg) by mouth once daily. 1/21/21   Historical Provider, MD   melatonin 3 mg capsule Take by mouth. 1-2 TABLETS NIGHTLY IF NEEDED 10/12/22   Historical Provider, MD   metFORMIN (Glucophage) 1,000 mg tablet Take 1 tablet (1,000 mg) by mouth every 12 hours. 7/14/16   Historical Provider, MD   methocarbamol (Robaxin) 500 mg tablet Take 1 tablet (500 mg) by mouth 2 times a day for 10 days. 10/19/23 12/18/23  Armando Mao, APRN-CNP   metoclopramide (Reglan) 10 mg tablet Take 1  tablet (10 mg) by mouth every 6 hours for 7 days. 8/22/24 8/29/24  Jessica Harris MD   metoprolol tartrate (Lopressor) 25 mg tablet Take 1 tablet (25 mg) by mouth 2 times a day. 4/1/19   Historical Provider, MD   naproxen (Naprosyn) 375 mg tablet Take 1 tablet (375 mg) by mouth. 5/13/24   Historical Provider, MD   nitroglycerin (Nitrostat) 0.4 mg SL tablet Place 1 tablet (0.4 mg) under the tongue every 5 minutes if needed for chest pain (MAX THREE TABS PER EPISODE). PLACE 1 TABLET UNDER THE TONGUE EVERY 5 MINUTES FOR UP TO 3 DOSES AS NEEDED FOR CHEST PAIN.CALL 911 IF PAIN PERSISTS. 6/6/18   Historical Provider, MD   omeprazole (PriLOSEC) 20 mg DR capsule Take 1 capsule (20 mg) by mouth twice a day. 10/12/22   Historical Provider, MD   ondansetron ODT (Zofran-ODT) 4 mg disintegrating tablet Take 1 tablet (4 mg) by mouth every 8 hours if needed for nausea or vomiting. 8/20/24   Beverley Powell DO   pregabalin (Lyrica) 150 mg capsule Take 1 capsule (150 mg) by mouth once daily in the morning. AND 2 CAPSULE IN THE EVENING 10/12/22   Historical Provider, MD   tamsulosin (Flomax) 0.4 mg 24 hr capsule Take 1 capsule (0.4 mg) by mouth once daily. 7/14/16   Historical Provider, MD   ticagrelor (Brilinta) 90 mg tablet Take 1 tablet (90 mg) by mouth twice a day. 7/6/17   Historical Provider, MD   traMADol (Ultram) 50 mg tablet Take 1 tablet (50 mg) by mouth every 6 hours if needed for severe pain (7 - 10).    Historical Provider, MD   traZODone (Desyrel) 50 mg tablet Take by mouth.    Historical Provider, MD       LABS AND IMAGING:     Last Labs:    Recent labs reviewed in the EMR.    Lab Results   Component Value Date    WBC 5.5 09/17/2024    HGB 11.8 (L) 09/17/2024    HGB 12.7 (L) 09/16/2024    MCV 88 09/17/2024     (L) 09/17/2024     (L) 09/16/2024       Lab Results   Component Value Date     09/17/2024    K 4.5 09/17/2024     (H) 09/17/2024    BUN 37 (H) 09/17/2024    CREATININE 1.34 (H)  "09/17/2024    CREATININE 1.57 (H) 09/16/2024       Lab Results   Component Value Date    BILITOT 0.3 09/17/2024    BILITOT 0.3 09/16/2024    ALKPHOS 50 09/17/2024    ALKPHOS 61 09/16/2024    AST 29 09/17/2024    AST 35 09/16/2024    ALT 48 09/17/2024    ALT 60 (H) 09/16/2024    LIPASE 60 09/16/2024       No results found for: \"CRP\", \"CALPS\"  No results found for: \"CALPS\"      Imaging Results     CT angio chest abdomen pelvis    Result Date: 9/16/2024  Interpreted By:  Raymond Guajardo, STUDY: CT ANGIO CHEST ABDOMEN PELVIS;  9/16/2024 9:02 pm   INDICATION: Signs/Symptoms:abdominal pain, hypotension.   COMPARISON: 08/22/2024   ACCESSION NUMBER(S): [id number]   ORDERING CLINICIAN: MARC PEDRAZA   TECHNIQUE: Axial non-contrast images of the chest abdomen, and pelvis.   Axial CT images of the chest, abdomen and pelvis were obtained after the intravenous administration of iodinated contrast using angiographic technique with coronal and sagittal reformatted images. MIP images and 3D reconstructions were created on an independent workstation and reviewed.   FINDINGS: VASCULATURE:   Thoracic and abdominal aorta intact without aneurysm or dissection.   Mild calcification origin of the great vessels which are otherwise widely patent.   Celiac artery is widely patent. Mild-to-moderate calcification origin of the SMA without significant stenosis.   Moderate calcification involving the origin of the renal arteries, left-greater-than-right with mild stenosis.   QUYNH is patent.   Bilateral iliac arteries are patent without significant stenosis.   Moderate atherosclerotic disease seen involving both common femoral arteries with areas of mild-to-moderate stenosis.   CT CHEST:   Heart size within normal limits. Severe coronary artery calcifications are present.   No significant adenopathy in the chest.   No effusions.   Minimal dependent atelectasis. Lungs are clear without consolidation.   CT ABDOMEN/PELVIS:   Arterial phase " imaging of the liver, gallbladder, adrenals, pancreas and spleen grossly unremarkable.   Gastric bypass changes are seen.   Kidneys intact without significant hydronephrosis or hydroureter. The bladder is within normal limits.   No bowel obstruction.   Normal appearing appendix.   No definite evidence of colitis   No free fluid or significant adenopathy.   There is mild-to-moderate spondylotic degeneration seen axial skeleton.       1. No thoracic or abdominal aortic aneurysm or acute aortic pathology.   2. Atherosclerotic disease as described above.   3. No other acute process identified in the chest, abdomen or pelvis on arterial phase imaging.   MACRO: None.   Signed by: Raymond Guajardo 9/16/2024 9:49 PM Dictation workstation:   [id number]    ECG 12 lead    Result Date: 8/23/2024  Sinus rhythm Consider left atrial enlargement See ED provider note for full interpretation and clinical correlation Confirmed by Yasmin Huizar ([id number]) on 8/23/2024 9:44:54 PM    ECG 12 lead    Result Date: 8/23/2024  Sinus rhythm See ED provider note for full interpretation and clinical correlation Confirmed by Yasmin Huizar ([id number]) on 8/23/2024 9:28:39 PM    CT abdomen pelvis w IV contrast    Result Date: 8/22/2024  Interpreted By:  Candice Baird, STUDY: CT ABDOMEN PELVIS W IV CONTRAST;  8/22/2024 6:50 pm   INDICATION: Signs/Symptoms:Lower abdominal pain, nausea, intractable vomiting, history of gastric bypass.   COMPARISON: CT abdomen and pelvis dated 08/19/2024   ACCESSION NUMBER(S): [id number]   ORDERING CLINICIAN: GUS DUMONT   TECHNIQUE: Contiguous axial images of the abdomen and pelvis were obtained after the intravenous administration of 75 mL Omnipaque 350 contrast. Coronal and sagittal reformatted images were reconstructed from the axial data. Additionally, 500 mL of oral contrast was also administered.   FINDINGS: Exam is somewhat degraded by motion.   LOWER CHEST: Mild atelectatic changes are present in the lobes  without evidence of consolidation or pleural effusion.   Heart is normal in size without pericardial effusion.   Slight wall thickening of the distal esophagus may represent component of esophagitis. Otherwise visualized esophagus is unremarkable in appearance.     ABDOMEN/PELVIS:   ABDOMINAL WALL: Cutaneous tissues of the abdomen and pelvis do not demonstrate any acute abnormality.   LIVER: No acute hepatic abnormality is present. Portal vein is unremarkable in appearance.   BILE DUCTS: No new intrahepatic or extrahepatic biliary dilatation is present.   GALLBLADDER: Gallbladder is somewhat distended with fluid, without evidence of new wall thickening or pericholecystic stranding.   PANCREAS: No pancreatic ductal dilatation or peripancreatic stranding is present.   SPLEEN: Spleen is unremarkable in appearance.   ADRENALS: Bilateral adrenal glands do not demonstrate any acute abnormalities.   KIDNEYS, URETERS, BLADDER: Kidneys are symmetric in size, without evidence of new hydronephrosis. Several punctate non occluding stones, largest measuring up to 2-3 mm in size in the upper pole of the right kidney, are similar to prior exam on 08/19/2024.   No new ureterolithiasis is identified.   No new bladder wall thickening is identified.   REPRODUCTIVE ORGANS: Prostate is unremarkable in appearance.   VESSELS: Atherosclerotic plaques are present in the abdominal aorta and its branching vessels without evidence of acute vascular abnormality.   RETROPERITONEUM/LYMPH NODES: No enlarged lymph nodes. No acute retroperitoneal abnormality.   BOWEL/MESENTERY/PERITONEUM: There is redemonstration of the postsurgical changes consistent with Ashok-en-Y gastric bypass, similar in appearance to prior exam. In the interim since prior examination on 08/19/2024, numerous decompressed loops of small bowel with mild mucosal enhancement are again present throughout the abdomen, with some improvement in the mesenteric haziness/stranding seen on  previous exam. There may be minimal wall thickening of the ascending colon, with the more distal transverse and descending colon appears unremarkable.   Appendix is unremarkable in appearance.   There is no evidence of free air, free fluid, or thick-walled collections in the abdomen or pelvis.     MUSCULOSKELETAL: No acute osseous abnormality. Multilevel degenerative changes of the lower thoracic and lumbar spine are similar to prior exam.       1. Persistent mild wall thickening/enhancement of the nondistended small-bowel in the abdomen, suggestive of resolving enterocolitis, although diffuse mesenteric haziness/stranding seen on previous exam on 08/19/2024 has significantly improved. There may be minimal residual wall thickening in the ascending colon, although descending in more distal colon is unremarkable in appearance. 2. Slight wall thickening of the distal esophagus may represent component of esophagitis. 3. Several punctate non occluding stones in the kidneys bilaterally are similar in appearance to prior exam.   MACRO: None.   Signed by: Candice Baird 8/22/2024 7:10 PM Dictation workstation:   [id number]    CT abdomen pelvis w IV contrast    Result Date: 8/19/2024  Interpreted By:  Travis Mccray, STUDY: CT ABDOMEN PELVIS W IV CONTRAST;  8/19/2024 10:02 pm   INDICATION: Signs/Symptoms:Epigastric abdominal pain.   COMPARISON: 5/7/2023   ACCESSION NUMBER(S): [id number]   ORDERING CLINICIAN: SHAMIR BOWLING   TECHNIQUE: Contiguous axial images of the abdomen and pelvis were obtained after the intravenous administration of  contrast. Coronal and sagittal reformatted images were obtained from the axial images.   FINDINGS: There is limited evaluation of the lung bases. No basilar airspace disease.   No evidence of liver mass. The gallbladder is present. No dilatation common bile duct.   The pancreas, spleen, and adrenal glands appear unremarkable.   Symmetric enhancement of the kidneys. Small nonobstructive  right renal calculi. No hydronephrosis.   Atherosclerotic calcification of the abdominal aorta and bilateral iliac arteries.   There is postsurgical change of gastric bypass surgery. No evidence of bowel obstruction or acute appendicitis. There is diffuse wall thickening and edema small bowel loops in the abdomen and pelvis and edema and fluid in the abdominal and pelvic fat. Prominent mesenteric lymph nodes may be reactive in nature. Small amount of free fluid in the pelvis.   Urinary bladder appears unremarkable.   Degenerative change of the lower lumbar spine.       Diffuse wall thickening and edema small bowel loops and edema in the abdominal and pelvic fat, findings may be secondary to enteritis and infectious/inflammatory process, and clinical correlation/evaluation is recommended.   Postsurgical change of prior gastric bypass surgery.   Small nonobstructive right renal calculi.   MACRO: None   Signed by: Travis Mccray 8/19/2024 10:57 PM Dictation workstation:   [id number]

## 2024-09-17 NOTE — PROGRESS NOTES
Manish Lance is a 64 y.o. male on day 0 of admission presenting with Acute kidney injury (CMS-HCC).    Review of Systems   Constitutional:  Positive for activity change and appetite change. Negative for chills, diaphoresis, fatigue and fever.   HENT:  Negative for congestion, facial swelling, sneezing and sore throat.    Respiratory:  Negative for cough, choking, chest tightness, shortness of breath and wheezing.    Cardiovascular:  Negative for chest pain, palpitations and leg swelling.   Gastrointestinal:  Positive for abdominal pain, diarrhea and nausea. Negative for constipation and vomiting.   Genitourinary:  Negative for dysuria, hematuria and urgency.   Musculoskeletal:  Negative for back pain, gait problem, joint swelling and neck pain.   Skin:  Negative for rash and wound.   Neurological:  Positive for dizziness, syncope, weakness and light-headedness.   Psychiatric/Behavioral:  Negative for agitation, confusion and hallucinations. The patient is not nervous/anxious.    All other systems reviewed and are negative.     Subjective   Manish Lance is a 64 y.o. male with PMHx s/f HTN, HLD, CAD s/p PCI, aortic stenosis, BHARTI, IDDM-II c/b peripheral neuropathy, anxiety, depression, chronic lower back pain, GERD, and h/o gastric bypass (Billroth II) presenting with ongoing epigastric abdominal pain, poor appetite and diminished intake, and fairly regular episodes of diarrhea (non-bloody, but a few episodes of grayish discoloration). Pt reports that he is tired and does not feel very well, though slightly improved after Protonix injection in the ED, but asks his wife to provide most history. In short, he was seen in the ED about 3 weeks ago and was diagnosed with an infection/colitis, and was given antibiotics. Despite completing tx, still having persistent epigastric pain resulting in poor appetite and not wanting to eat from the pain. He has also not been drinking much. Has had multiple episodes of standing  up and getting lightheaded/dizzy and passing out, but has never fallen because his wife has been able to catch him. Does have a prior history of this as well with both cardiac and orthostatic syncope. He reports no fevers, chills, cp/pressure, palpitations, vomiting, diaphoresis, ha, vision changes, focal and/or lateralizing sensory or motor deficits.      Has previously seen Dr. Marti for EGD/Colonoscopy in setting of unexplained weight loss after a plateau after initial wt loss from his gastric bypass surgery. Mostly recently saw him and had results from EGD/colonoscopy 09/22/23.      ED Course (Summary):   Vitals on presentation: T98.5, BP 88/58 (improved to 108/74 with 2 L of fluid), HR 66, RR 18, SpO2 98% RA  Labs: CBC with WBC 7.2, Hgb 12.7, platelets 140.  CMP with glucose 179, sodium 133, potassium 5.0, BUN 44, serum creatinine 1.57.  Lipase 60.  High-sensitivity troponin 3.  PT/INR: 11.2/1.0.  UA with 1+ bacteria but negative nitrites and leukocyte esterase, only 1-5 WBCs.  Imaging: CTA chest/abdomen/pelvis is negative for overt acute process  Interventions: 2 L normal saline, 4 mg Zofran, 40 mg IV push pantoprazole, admitted to medicine for further management    9/17: Patient seen.  States he can only tolerate clear liquids.  States the eating thing thicker or solid will give him abdominal pain.  This does not necessarily give him diarrhea.  States he is having loose stools once or twice a day, states it appears to be gray in color.  No blood.  No fevers or chills.  States that antibiotics provided by ED did not help at all.  Has had multiple prior stents per cardiology.  Known to GI service.  Await input from GI, check serum lactic acid.  May want to consider mesenteric ischemia.       Objective     Last Recorded Vitals  /70 (BP Location: Right arm, Patient Position: Lying)   Pulse 65   Temp 36.2 °C (97.2 °F) (Temporal)   Resp 16   Wt 74.8 kg (165 lb)   SpO2 97%   Intake/Output last 3  Shifts:  No intake or output data in the 24 hours ending 09/17/24 1037    Admission Weight  Weight: 74.8 kg (165 lb) (09/16/24 1851)    Daily Weight  09/16/24 : 74.8 kg (165 lb)      Physical Exam  Constitutional:       General: He is not in acute distress.  HENT:      Head: Normocephalic and atraumatic.      Nose: Nose normal. No congestion or rhinorrhea.      Mouth/Throat:      Mouth: Mucous membranes are dry.      Pharynx: Oropharynx is clear.   Eyes:      General: No scleral icterus.     Extraocular Movements: Extraocular movements intact.      Pupils: Pupils are equal, round, and reactive to light.   Cardiovascular:      Rate and Rhythm: Normal rate and regular rhythm.      Heart sounds: Normal heart sounds. No murmur heard.     No friction rub. No gallop.   Pulmonary:      Effort: Pulmonary effort is normal.      Breath sounds: Normal breath sounds. No wheezing, rhonchi or rales.   Chest:      Chest wall: No tenderness.   Abdominal:      General: There is no distension.      Palpations: Abdomen is soft.      Tenderness: There is no abdominal tenderness. There is no guarding or rebound.   Musculoskeletal:         General: No swelling, tenderness or signs of injury. Normal range of motion.      Cervical back: Normal range of motion.   Skin:     General: Skin is warm and dry.      Coloration: Skin is not jaundiced.      Findings: No bruising, erythema or rash.   Neurological:      General: No focal deficit present.      Mental Status: He is alert and oriented to person, place, and time.      Cranial Nerves: No cranial nerve deficit.      Sensory: No sensory deficit.      Coordination: Coordination normal.      Gait: Gait normal.      Deep Tendon Reflexes: Reflexes normal.          Lab Results  Results for orders placed or performed during the hospital encounter of 09/16/24 (from the past 24 hour(s))   CBC and Auto Differential   Result Value Ref Range    WBC 7.2 4.4 - 11.3 x10*3/uL    nRBC 0.0 0.0 - 0.0 /100 WBCs     RBC 4.35 (L) 4.50 - 5.90 x10*6/uL    Hemoglobin 12.7 (L) 13.5 - 17.5 g/dL    Hematocrit 38.3 (L) 41.0 - 52.0 %    MCV 88 80 - 100 fL    MCH 29.2 26.0 - 34.0 pg    MCHC 33.2 32.0 - 36.0 g/dL    RDW 14.7 (H) 11.5 - 14.5 %    Platelets 140 (L) 150 - 450 x10*3/uL    Neutrophils % 60.0 40.0 - 80.0 %    Immature Granulocytes %, Automated 0.3 0.0 - 0.9 %    Lymphocytes % 21.2 13.0 - 44.0 %    Monocytes % 7.6 2.0 - 10.0 %    Eosinophils % 9.8 0.0 - 6.0 %    Basophils % 1.1 0.0 - 2.0 %    Neutrophils Absolute 4.32 1.20 - 7.70 x10*3/uL    Immature Granulocytes Absolute, Automated 0.02 0.00 - 0.70 x10*3/uL    Lymphocytes Absolute 1.53 1.20 - 4.80 x10*3/uL    Monocytes Absolute 0.55 0.10 - 1.00 x10*3/uL    Eosinophils Absolute 0.71 (H) 0.00 - 0.70 x10*3/uL    Basophils Absolute 0.08 0.00 - 0.10 x10*3/uL   Comprehensive metabolic panel   Result Value Ref Range    Glucose 179 (H) 74 - 99 mg/dL    Sodium 133 (L) 136 - 145 mmol/L    Potassium 5.0 3.5 - 5.3 mmol/L    Chloride 106 98 - 107 mmol/L    Bicarbonate 18 (L) 21 - 32 mmol/L    Anion Gap 14 10 - 20 mmol/L    Urea Nitrogen 44 (H) 6 - 23 mg/dL    Creatinine 1.57 (H) 0.50 - 1.30 mg/dL    eGFR 49 (L) >60 mL/min/1.73m*2    Calcium 8.3 (L) 8.6 - 10.3 mg/dL    Albumin 3.9 3.4 - 5.0 g/dL    Alkaline Phosphatase 61 33 - 136 U/L    Total Protein 6.0 (L) 6.4 - 8.2 g/dL    AST 35 9 - 39 U/L    Bilirubin, Total 0.3 0.0 - 1.2 mg/dL    ALT 60 (H) 10 - 52 U/L   Protime-INR   Result Value Ref Range    Protime 11.2 9.8 - 12.8 seconds    INR 1.0 0.9 - 1.1   Troponin I, High Sensitivity   Result Value Ref Range    Troponin I, High Sensitivity 3 0 - 20 ng/L   Lipase   Result Value Ref Range    Lipase 60 9 - 82 U/L   Urinalysis with Reflex Culture and Microscopic   Result Value Ref Range    Color, Urine Light-Yellow Light-Yellow, Yellow, Dark-Yellow    Appearance, Urine Clear Clear    Specific Gravity, Urine 1.024 1.005 - 1.035    pH, Urine 5.0 5.0, 5.5, 6.0, 6.5, 7.0, 7.5, 8.0    Protein,  Urine 10 (TRACE) NEGATIVE, 10 (TRACE), 20 (TRACE) mg/dL    Glucose, Urine OVER (4+) (A) Normal mg/dL    Blood, Urine NEGATIVE NEGATIVE    Ketones, Urine NEGATIVE NEGATIVE mg/dL    Bilirubin, Urine NEGATIVE NEGATIVE    Urobilinogen, Urine Normal Normal mg/dL    Nitrite, Urine NEGATIVE NEGATIVE    Leukocyte Esterase, Urine NEGATIVE NEGATIVE   Extra Urine Gray Tube   Result Value Ref Range    Extra Tube Hold for add-ons.    Urinalysis Microscopic   Result Value Ref Range    WBC, Urine 1-5 1-5, NONE /HPF    RBC, Urine NONE NONE, 1-2, 3-5 /HPF    Bacteria, Urine 1+ (A) NONE SEEN /HPF    Mucus, Urine FEW Reference range not established. /LPF    Hyaline Casts, Urine OCCASIONAL (A) NONE /LPF   CBC and Auto Differential   Result Value Ref Range    WBC 5.5 4.4 - 11.3 x10*3/uL    nRBC 0.0 0.0 - 0.0 /100 WBCs    RBC 4.03 (L) 4.50 - 5.90 x10*6/uL    Hemoglobin 11.8 (L) 13.5 - 17.5 g/dL    Hematocrit 35.6 (L) 41.0 - 52.0 %    MCV 88 80 - 100 fL    MCH 29.3 26.0 - 34.0 pg    MCHC 33.1 32.0 - 36.0 g/dL    RDW 14.6 (H) 11.5 - 14.5 %    Platelets 110 (L) 150 - 450 x10*3/uL    Neutrophils % 39.4 40.0 - 80.0 %    Immature Granulocytes %, Automated 0.2 0.0 - 0.9 %    Lymphocytes % 39.9 13.0 - 44.0 %    Monocytes % 7.5 2.0 - 10.0 %    Eosinophils % 11.7 0.0 - 6.0 %    Basophils % 1.3 0.0 - 2.0 %    Neutrophils Absolute 2.17 1.20 - 7.70 x10*3/uL    Immature Granulocytes Absolute, Automated 0.01 0.00 - 0.70 x10*3/uL    Lymphocytes Absolute 2.19 1.20 - 4.80 x10*3/uL    Monocytes Absolute 0.41 0.10 - 1.00 x10*3/uL    Eosinophils Absolute 0.64 0.00 - 0.70 x10*3/uL    Basophils Absolute 0.07 0.00 - 0.10 x10*3/uL   Comprehensive Metabolic Panel   Result Value Ref Range    Glucose 139 (H) 74 - 99 mg/dL    Sodium 137 136 - 145 mmol/L    Potassium 4.5 3.5 - 5.3 mmol/L    Chloride 111 (H) 98 - 107 mmol/L    Bicarbonate 20 (L) 21 - 32 mmol/L    Anion Gap 11 10 - 20 mmol/L    Urea Nitrogen 37 (H) 6 - 23 mg/dL    Creatinine 1.34 (H) 0.50 - 1.30 mg/dL     eGFR 59 (L) >60 mL/min/1.73m*2    Calcium 8.0 (L) 8.6 - 10.3 mg/dL    Albumin 3.4 3.4 - 5.0 g/dL    Alkaline Phosphatase 50 33 - 136 U/L    Total Protein 5.1 (L) 6.4 - 8.2 g/dL    AST 29 9 - 39 U/L    Bilirubin, Total 0.3 0.0 - 1.2 mg/dL    ALT 48 10 - 52 U/L   Magnesium   Result Value Ref Range    Magnesium 1.73 1.60 - 2.40 mg/dL   POCT GLUCOSE   Result Value Ref Range    POCT Glucose 139 (H) 74 - 99 mg/dL        Image Results  CT angio chest abdomen pelvis  Narrative: Interpreted By:  Raymond Guajardo,   STUDY:  CT ANGIO CHEST ABDOMEN PELVIS;  9/16/2024 9:02 pm      INDICATION:  Signs/Symptoms:abdominal pain, hypotension.      COMPARISON:  08/22/2024      ACCESSION NUMBER(S):  [id number]      ORDERING CLINICIAN:  MARC PEDRAZA      TECHNIQUE:  Axial non-contrast images of the chest abdomen, and pelvis.      Axial CT images of the chest, abdomen and pelvis were obtained after  the intravenous administration of iodinated contrast using  angiographic technique with coronal and sagittal reformatted images.  MIP images and 3D reconstructions were created on an independent  workstation and reviewed.      FINDINGS:  VASCULATURE:      Thoracic and abdominal aorta intact without aneurysm or dissection.      Mild calcification origin of the great vessels which are otherwise  widely patent.      Celiac artery is widely patent. Mild-to-moderate calcification origin  of the SMA without significant stenosis.      Moderate calcification involving the origin of the renal arteries,  left-greater-than-right with mild stenosis.      QUYNH is patent.      Bilateral iliac arteries are patent without significant stenosis.      Moderate atherosclerotic disease seen involving both common femoral  arteries with areas of mild-to-moderate stenosis.      CT CHEST:      Heart size within normal limits. Severe coronary artery  calcifications are present.      No significant adenopathy in the chest.      No effusions.      Minimal  dependent atelectasis. Lungs are clear without consolidation.      CT ABDOMEN/PELVIS:      Arterial phase imaging of the liver, gallbladder, adrenals, pancreas  and spleen grossly unremarkable.      Gastric bypass changes are seen.      Kidneys intact without significant hydronephrosis or hydroureter. The  bladder is within normal limits.      No bowel obstruction.      Normal appearing appendix.      No definite evidence of colitis      No free fluid or significant adenopathy.      There is mild-to-moderate spondylotic degeneration seen axial  skeleton.      Impression: 1. No thoracic or abdominal aortic aneurysm or acute aortic pathology.      2. Atherosclerotic disease as described above.      3. No other acute process identified in the chest, abdomen or pelvis  on arterial phase imaging.      MACRO:  None.      Signed by: Raymond Guajardo 9/16/2024 9:49 PM  Dictation workstation:   [id number]       Assessment/Plan     Epigastric pain  Assessment & Plan  -No acute etiologies evident on CTA chest/abdomen/pelvis  -No systemic infectious symptoms reported per patient or wife  -Will check stool pathogen panel PCR in addition to C. difficile PCR; with episodic gray-colored stool may consider additional evaluation such as hepatitis panel but will defer to GI  -Continue supportive care and fluid resuscitation  -GI consultation appreciated  9/17: States he has pain whenever he eats solid foods or thickened liquids.  Can only tolerate thin liquids.  This does not appear to be associated with loose stools.  Having about 1-2 gray-colored stools per day, states they are watery.  Await input from GI.  Stool studies have been sent but he has had no BM since admitted.    * Acute kidney injury (CMS-HCC)  Assessment & Plan  Acute Kidney Injury with Dehydration  Mild metabolic acidosis, likely d/t CRISSY + diarrhea   Mild hypovolemic hyponatremia  -Baseline serum creatinine: ~1.1  -Creatinine at admission: 1,57 with BUN 44  -Suspect  secondary to GI losses, diminished intake, hypotension 2/2 hypovolemia, +/- taking home antihypertensives   -IVF: s/p 2L NS in ED. Continue LR @ 75cc/hr overnight  -Na 135 when accounting for glucose (mild hypovolemic hyponatremia on admit)   -Monitor UOP. Hold home antihypertensives.   -Recheck renal function panel in AM   -Nephrology consultation if not improving  9/17: Creatinine is improving.  Suspect hypovolemia.    Anxiety and depression  Assessment & Plan  -Confirm and resume home medications    Type 2 diabetes mellitus with diabetic neuropathy, with long-term current use of insulin (Multi)  Assessment & Plan  IDDM-II c/b peripheral neuropathy  -Continue with SSI as per NPO scale while patient is not willing to take in PO   -Continue with accucheks and hypoglycemic protocol   -Monitor and adjust as needed while admitted     Orthostatic hypotension  Assessment & Plan  Syncope 2/2 orthostasis   -No current CP or anginal symptoms.   -HSTI neg. EKG without acute ischemic changes.   -Check orthostatic vitals  9/17: Suspect secondary to hypovolemia.  Appears to be resolving.    H/O gastric bypass  Assessment & Plan  Will continue to monitor.    Hyperlipidemia  Assessment & Plan  Continue home meds, adjust as needed.    CAD in native artery  Assessment & Plan  Continue home meds, adjust as needed.    Hypertension  Assessment & Plan  Currently has hypotension 2/2 hypovolemia   -Improving with volume expansion   -Hold home antihypertensives and continue fluids as above                  Armando Mathis MD

## 2024-09-17 NOTE — H&P
[facility] - GENERAL MEDICINE HISTORY AND PHYSICAL    History Obtained From: Patient, but predominantly collateral sources  Collateral History: Family at the bedside, chart review, d/w ED physician     History Of Present Illness:  Manish Lance is a 64 y.o. male with PMHx s/f HTN, HLD, CAD s/p PCI, aortic stenosis, BHARTI, IDDM-II c/b peripheral neuropathy, anxiety, depression, chronic lower back pain, GERD, and h/o gastric bypass (Billroth II) presenting with ongoing epigastric abdominal pain, poor appetite and diminished intake, and fairly regular episodes of diarrhea (non-bloody, but a few episodes of grayish discoloration). Pt reports that he is tired and does not feel very well, though slightly improved after Protonix injection in the ED, but asks his wife to provide most history. In short, he was seen in the ED about 3 weeks ago and was diagnosed with an infection/colitis, and was given antibiotics. Despite completing tx, still having persistent epigastric pain resulting in poor appetite and not wanting to eat from the pain. He has also not been drinking much. Has had multiple episodes of standing up and getting lightheaded/dizzy and passing out, but has never fallen because his wife has been able to catch him. Does have a prior history of this as well with both cardiac and orthostatic syncope. He reports no fevers, chills, cp/pressure, palpitations, vomiting, diaphoresis, ha, vision changes, focal and/or lateralizing sensory or motor deficits.     Has previously seen Dr. Marti for EGD/Colonoscopy in setting of unexplained weight loss after a plateau after initial wt loss from his gastric bypass surgery. Mostly recently saw him and had results from EGD/colonoscopy 09/22/23.     ED Course (Summary):   Vitals on presentation: T98.5, BP 88/58 (improved to 108/74 with 2 L of fluid), HR 66, RR 18, SpO2 98% RA  Labs: CBC with WBC 7.2, Hgb 12.7, platelets 140.  CMP with glucose 179, sodium 133,  potassium 5.0, BUN 44, serum creatinine 1.57.  Lipase 60.  High-sensitivity troponin 3.  PT/INR: 11.2/1.0.  UA with 1+ bacteria but negative nitrites and leukocyte esterase, only 1-5 WBCs.  Imaging: CTA chest/abdomen/pelvis is negative for overt acute process  Interventions: 2 L normal saline, 4 mg Zofran, 40 mg IV push pantoprazole, admitted to medicine for further management    ED Course (From ED Provider):  Diagnoses as of 09/17/24 0002   Dehydration   Syncope, unspecified syncope type   Epigastric pain     Relevant Results  Results for orders placed or performed during the hospital encounter of 09/16/24 (from the past 24 hour(s))   CBC and Auto Differential   Result Value Ref Range    WBC 7.2 4.4 - 11.3 x10*3/uL    nRBC 0.0 0.0 - 0.0 /100 WBCs    RBC 4.35 (L) 4.50 - 5.90 x10*6/uL    Hemoglobin 12.7 (L) 13.5 - 17.5 g/dL    Hematocrit 38.3 (L) 41.0 - 52.0 %    MCV 88 80 - 100 fL    MCH 29.2 26.0 - 34.0 pg    MCHC 33.2 32.0 - 36.0 g/dL    RDW 14.7 (H) 11.5 - 14.5 %    Platelets 140 (L) 150 - 450 x10*3/uL    Neutrophils % 60.0 40.0 - 80.0 %    Immature Granulocytes %, Automated 0.3 0.0 - 0.9 %    Lymphocytes % 21.2 13.0 - 44.0 %    Monocytes % 7.6 2.0 - 10.0 %    Eosinophils % 9.8 0.0 - 6.0 %    Basophils % 1.1 0.0 - 2.0 %    Neutrophils Absolute 4.32 1.20 - 7.70 x10*3/uL    Immature Granulocytes Absolute, Automated 0.02 0.00 - 0.70 x10*3/uL    Lymphocytes Absolute 1.53 1.20 - 4.80 x10*3/uL    Monocytes Absolute 0.55 0.10 - 1.00 x10*3/uL    Eosinophils Absolute 0.71 (H) 0.00 - 0.70 x10*3/uL    Basophils Absolute 0.08 0.00 - 0.10 x10*3/uL   Comprehensive metabolic panel   Result Value Ref Range    Glucose 179 (H) 74 - 99 mg/dL    Sodium 133 (L) 136 - 145 mmol/L    Potassium 5.0 3.5 - 5.3 mmol/L    Chloride 106 98 - 107 mmol/L    Bicarbonate 18 (L) 21 - 32 mmol/L    Anion Gap 14 10 - 20 mmol/L    Urea Nitrogen 44 (H) 6 - 23 mg/dL    Creatinine 1.57 (H) 0.50 - 1.30 mg/dL    eGFR 49 (L) >60 mL/min/1.73m*2    Calcium 8.3  (L) 8.6 - 10.3 mg/dL    Albumin 3.9 3.4 - 5.0 g/dL    Alkaline Phosphatase 61 33 - 136 U/L    Total Protein 6.0 (L) 6.4 - 8.2 g/dL    AST 35 9 - 39 U/L    Bilirubin, Total 0.3 0.0 - 1.2 mg/dL    ALT 60 (H) 10 - 52 U/L   Protime-INR   Result Value Ref Range    Protime 11.2 9.8 - 12.8 seconds    INR 1.0 0.9 - 1.1   Troponin I, High Sensitivity   Result Value Ref Range    Troponin I, High Sensitivity 3 0 - 20 ng/L   Lipase   Result Value Ref Range    Lipase 60 9 - 82 U/L   Urinalysis with Reflex Culture and Microscopic   Result Value Ref Range    Color, Urine Light-Yellow Light-Yellow, Yellow, Dark-Yellow    Appearance, Urine Clear Clear    Specific Gravity, Urine 1.024 1.005 - 1.035    pH, Urine 5.0 5.0, 5.5, 6.0, 6.5, 7.0, 7.5, 8.0    Protein, Urine 10 (TRACE) NEGATIVE, 10 (TRACE), 20 (TRACE) mg/dL    Glucose, Urine OVER (4+) (A) Normal mg/dL    Blood, Urine NEGATIVE NEGATIVE    Ketones, Urine NEGATIVE NEGATIVE mg/dL    Bilirubin, Urine NEGATIVE NEGATIVE    Urobilinogen, Urine Normal Normal mg/dL    Nitrite, Urine NEGATIVE NEGATIVE    Leukocyte Esterase, Urine NEGATIVE NEGATIVE   Urinalysis Microscopic   Result Value Ref Range    WBC, Urine 1-5 1-5, NONE /HPF    RBC, Urine NONE NONE, 1-2, 3-5 /HPF    Bacteria, Urine 1+ (A) NONE SEEN /HPF    Mucus, Urine FEW Reference range not established. /LPF    Hyaline Casts, Urine OCCASIONAL (A) NONE /LPF      CT angio chest abdomen pelvis    Result Date: 9/16/2024  Interpreted By:  Raymond Guajardo, STUDY: CT ANGIO CHEST ABDOMEN PELVIS;  9/16/2024 9:02 pm   INDICATION: Signs/Symptoms:abdominal pain, hypotension.   COMPARISON: 08/22/2024   ACCESSION NUMBER(S): [id number]   ORDERING CLINICIAN: MARC PEDRAZA   TECHNIQUE: Axial non-contrast images of the chest abdomen, and pelvis.   Axial CT images of the chest, abdomen and pelvis were obtained after the intravenous administration of iodinated contrast using angiographic technique with coronal and sagittal reformatted images.  MIP images and 3D reconstructions were created on an independent workstation and reviewed.   FINDINGS: VASCULATURE:   Thoracic and abdominal aorta intact without aneurysm or dissection.   Mild calcification origin of the great vessels which are otherwise widely patent.   Celiac artery is widely patent. Mild-to-moderate calcification origin of the SMA without significant stenosis.   Moderate calcification involving the origin of the renal arteries, left-greater-than-right with mild stenosis.   QUYNH is patent.   Bilateral iliac arteries are patent without significant stenosis.   Moderate atherosclerotic disease seen involving both common femoral arteries with areas of mild-to-moderate stenosis.   CT CHEST:   Heart size within normal limits. Severe coronary artery calcifications are present.   No significant adenopathy in the chest.   No effusions.   Minimal dependent atelectasis. Lungs are clear without consolidation.   CT ABDOMEN/PELVIS:   Arterial phase imaging of the liver, gallbladder, adrenals, pancreas and spleen grossly unremarkable.   Gastric bypass changes are seen.   Kidneys intact without significant hydronephrosis or hydroureter. The bladder is within normal limits.   No bowel obstruction.   Normal appearing appendix.   No definite evidence of colitis   No free fluid or significant adenopathy.   There is mild-to-moderate spondylotic degeneration seen axial skeleton.       1. No thoracic or abdominal aortic aneurysm or acute aortic pathology.   2. Atherosclerotic disease as described above.   3. No other acute process identified in the chest, abdomen or pelvis on arterial phase imaging.   MACRO: None.   Signed by: Raymond Guajardo 9/16/2024 9:49 PM Dictation workstation:   [id number]    Scheduled medications:  [START ON 9/17/2024] aspirin, 81 mg, oral, Daily  atorvastatin, 40 mg, oral, Nightly  enoxaparin, 40 mg, subcutaneous, q24h  insulin lispro, 0-10 Units, subcutaneous, q6h  [START ON 9/17/2024]  pantoprazole, 40 mg, oral, Daily before breakfast   Or  [START ON 9/17/2024] pantoprazole, 40 mg, intravenous, Daily before breakfast  pantoprazole, 40 mg, intravenous, Daily  [START ON 9/17/2024] polyethylene glycol, 17 g, oral, Daily  sodium chloride, 1,000 mL, intravenous, Once      Continuous medications:  lactated Ringer's, 75 mL/hr      PRN medications:  PRN medications: acetaminophen, albuterol, bisacodyl, bisacodyl, dextrose, dextrose, glucagon, glucagon, guaiFENesin, melatonin, ondansetron **OR** ondansetron, promethazine     Past Medical History  He has a past medical history of Diabetes mellitus (Multi), Personal history of other diseases of the circulatory system (10/16/2020), and Personal history of other diseases of the circulatory system (10/16/2020).    Surgical History  He has a past surgical history that includes Coronary angioplasty (07/14/2017); Gastric bypass (07/14/2017); Back surgery (07/14/2017); and Carpal tunnel release (07/14/2017).     Social History  He reports that he quit smoking about 29 years ago. His smoking use included cigarettes. He has quit using smokeless tobacco. He reports that he does not currently use alcohol. He reports that he does not use drugs.    Family History  Family History   Problem Relation Name Age of Onset    Coronary artery disease Mother      Coronary artery disease Father      Coronary artery disease Brother       Denies family history of GI disease including specifically denying IBD, colorectal cancer, esophageal cancer, and gastric cancer.     Allergies  Patient has no known allergies.    Code Status  Full Code     Review of Systems   Constitutional:  Positive for activity change, appetite change and fatigue. Negative for chills, diaphoresis and fever.   Eyes:  Negative for photophobia and visual disturbance.   Respiratory:  Negative for cough and shortness of breath.    Cardiovascular:  Negative for chest pain, palpitations and leg swelling.    Gastrointestinal:  Positive for abdominal pain, diarrhea and nausea. Negative for blood in stool, constipation and vomiting.   Endocrine: Negative for polydipsia and polyuria.   Genitourinary:  Negative for decreased urine volume, dysuria, hematuria and urgency.   Skin:  Negative for color change and rash.   Neurological:  Positive for dizziness, syncope, weakness and light-headedness. Negative for tremors.   Psychiatric/Behavioral:  Negative for confusion and sleep disturbance.    All other systems reviewed and are negative.    Last Recorded Vitals  /66   Pulse 65   Temp 36.5 °C (97.7 °F) (Temporal)   Resp 10   Wt 74.8 kg (165 lb)   SpO2 95%      Physical Exam:  Vital signs and nursing notes reviewed. Wife at the bedside.   Constitutional: Pleasant and cooperative. Laying in bed in no acute distress. Appears to feel unwell but is not systemically toxic.    Skin: Warm and dry; no obvious lesions, rashes, pallor, or jaundice. Good turgor.   Eyes: EOMI. Anicteric sclera.   ENT: Mucous membranes dry; no obvious injury or deformity appreciated.   Head and Neck: Normocephalic, atraumatic. ROM preserved. Trachea midline. No appreciable JVD.   Respiratory: Nonlabored on RA. Lungs clear to auscultation bilaterally without obvious adventitious sounds. Chest rise is equal.  Cardiovascular: RRR. No gross murmur, gallop, or rub. Extremities are warm and well-perfused with good capillary refill (< 3 seconds). No chest wall tenderness.   GI: Abdomen soft, nondistended, +TTP in the epigastric and LUQ regions. No obvious organomegaly appreciated. Bowel sounds are present.  : No CVA tenderness.   MSK: No gross abnormalities appreciated. No limitations to AROM/PROM appreciated.   Extremities: No cyanosis, edema, or clubbing evident. Neurovascularly intact.   Neuro: A&Ox3. CN 2-12 grossly intact. Able to respond to questions appropriately and clearly. No acute focal neurologic deficits appreciated.  Psych: Appropriate  mood and behavior.    Assessment/Plan     64 y.o. male with PMHx s/f HTN, HLD, CAD s/p PCI, aortic stenosis, BHARTI, IDDM-II c/b peripheral neuropathy, anxiety, depression, chronic lower back pain, GERD, and h/o gastric bypass (Billroth II) presenting with ongoing epigastric abdominal pain, poor appetite and diminished intake, and fairly regular episodes of diarrhea (non-bloody, but a few episodes of grayish discoloration).     Persistent epigastric abdominal pain  Persistent diarrhea   Inability to tolerate oral intake   h/o gastric bypass (Billroth II)   -No acute etiologies evident on CTA chest/abdomen/pelvis  -No systemic infectious symptoms reported per patient or wife  -Will check stool pathogen panel PCR in addition to C. difficile PCR; with episodic gray-colored stool may consider additional evaluation such as hepatitis panel but will defer to GI  -Continue supportive care and fluid resuscitation  -GI consultation appreciated    Acute Kidney Injury with Dehydration  Mild metabolic acidosis, likely d/t CRISSY + diarrhea   Mild hypovolemic hyponatremia  -Baseline serum creatinine: ~1.1  -Creatinine at admission: 1,57 with BUN 44  -Suspect secondary to GI losses, diminished intake, hypotension 2/2 hypovolemia, +/- taking home antihypertensives   -IVF: s/p 2L NS in ED. Continue LR @ 75cc/hr overnight  -Na 135 when accounting for glucose (mild hypovolemic hyponatremia on admit)   -Monitor UOP. Hold home antihypertensives.   -Recheck renal function panel in AM   -Nephrology consultation if not improving    Hypotension 2/2 hypovolemia   -Improving with volume expansion   -Hold home antihypertensives and continue fluids as above     Syncope 2/2 orthostasis   -No current CP or anginal symptoms.   -HSTI neg. EKG without acute ischemic changes.   -Check orthostatic vitals    HTN, HLD, CAD s/p PCI  -Hold home antihypertensives   -No CP or anginal symptoms  -Monitoring on tele  -Continue home antiplatelets and other meds to be  resumed as appropriate     IDDM-II c/b peripheral neuropathy  -Continue with SSI as per NPO scale while patient is not willing to take in PO   -Continue with accucheks and hypoglycemic protocol   -Monitor and adjust as needed while admitted     Anxiety and depression  -Confirm and resume home medications    cLBP  -Confirm and resume home medications    Diet: CLD, advance as tolerated  DVT Prophylaxis: SCDs, SQH  Code Status: Full Code     Lynn Gaspar PA-C    Dragon dictation software was used to dictate this note and thus there may be minor errors in translation/transcription including garbled speech or misspellings. Please contact for clarification if needed.

## 2024-09-17 NOTE — ASSESSMENT & PLAN NOTE
Acute Kidney Injury with Dehydration  Mild metabolic acidosis, likely d/t CRISSY + diarrhea   Mild hypovolemic hyponatremia  -Baseline serum creatinine: ~1.1  -Creatinine at admission: 1,57 with BUN 44  -Suspect secondary to GI losses, diminished intake, hypotension 2/2 hypovolemia, +/- taking home antihypertensives   -IVF: s/p 2L NS in ED. Continue LR @ 75cc/hr overnight  -Na 135 when accounting for glucose (mild hypovolemic hyponatremia on admit)   -Monitor UOP. Hold home antihypertensives.   -Recheck renal function panel in AM   -Nephrology consultation if not improving  9/17: Creatinine is improving.  Suspect hypovolemia.

## 2024-09-17 NOTE — PROGRESS NOTES
Physical Therapy    Physical Therapy Evaluation    Patient Name: Manish Lance  MRN: [id number]  Department: [facility] [place] [facility]  Room: 2310/2310-A  Today's Date: 9/17/2024   Time Calculation  Start Time: 1356  Stop Time: 1410  Time Calculation (min): 14 min    Assessment/Plan   PT Assessment  PT Assessment Results: Decreased strength, Decreased endurance, Impaired balance, Decreased mobility, Pain  Rehab Prognosis: Fair  Evaluation/Treatment Tolerance: Patient limited by fatigue  End of Session Communication: Bedside nurse (MOBILITY STATUS)  Assessment Comment: CGA AMB IN [place], DECREASED AMB ENDURANCE AND BALANCE, FATIGUED W/ AMB, FALL RISK, LOW REHAB ON DISCH PENDING PROGRESS  End of Session Patient Position: Bed, 3 rail up, Alarm off, not on at start of session (STACEY BERMEO PRESENT)  IP OR SWING BED PT PLAN  Inpatient or Swing Bed: Inpatient  PT Plan  Treatment/Interventions: Transfer training, Gait training, Stair training, Strengthening, Endurance training  PT Plan: Ongoing PT  PT Frequency: 4 times per week  PT Discharge Recommendations: Low intensity level of continued care  Equipment Recommended upon Discharge:  (TBD)  PT Recommended Transfer Status: Stand by assist (IN [place])  PT - OK to Discharge: Yes (WHEN MEDICALLY CLEARED)      Subjective   General Visit Information:  General  Reason for Referral: IMPAIRED MOBILITY  Referred By: ANNELISE UMAÑA  Past Medical History Relevant to Rehab: ABDOMINAL PAIN, DIARRHEA, BLACK STOOLS SYNCOPAL EPISODES; DX: DEYDRATIN, SYNCOPE, CRISSY, HYPOTENSION; HX: DM, CAD, HTN, BACK SURG, GASTRIC BYPASS, AORTIC STENOSIS, BHARTI, ANXIETY DEPRESSION CHRONIC LOW BACKPAIN  Family/Caregiver Present: Yes  Caregiver Feedback: WIFE, GRANDSON, CONFIRM HOME INFO  Co-Treatment: OT  Co-Treatment Reason: FACILITATE MOBILITY SAFETY  Patient Position Received: Bed, 3 rail up, Alarm off, not on at start of session (JUST ARIVED BACK IN ROOM FROM TEST, ROOM 2310 ALERT IV)  General Comment: FLAT  AFFECT  Home Living:  Home Living  Home Living Comments: SPOUSE, GRANDSON, 2 LEVELHOEM, 2 JULIÁN/0 RAILS, INDEP AMB/ADL, DOES CHORES, WOE[employer] IN G[employer], RETIRED [employer], DRIVES  Prior Level of Function:     Precautions:  Precautions  Medical Precautions: Fall precautions              Objective   Pain:  Pain Assessment  0-10 (Numeric) Pain Score:  (4-5/10 IN ABDOMEN)  Cognition:  Cognition  Overall Cognitive Status: Within Functional Limits  Arousal/Alertness: Appropriate responses to stimuli  Orientation Level: Oriented X4  Following Commands: Follows one step commands without difficulty  Safety Judgment: Decreased awareness of need for safety precautions  Safety/Judgement: Exceptions to WFL  Complex Functional Tasks: Minimal  Novel Situations: Minimal  Insight: Within function limits    General Assessments:                  Activity Tolerance  Endurance: Decreased tolerance for upright activites    Sensation  Sensation Comment: NEUROPATHY IN FEET, MILD NUMBNESSIN HANDS AS WELL    Strength  Strength Comments: ROM IN LEGS WFL, STRENGTH 3+/5  Strength  Strength Comments: ROM IN LEGS WFL, STRENGTH 3+/5                     Static Sitting Balance  Static Sitting-Comment/Number of Minutes: GOOD  Dynamic Sitting Balance  Dynamic Sitting-Comments: FAIR    Static Standing Balance  Static Standing-Comment/Number of Minutes: FAIR/FAIR-  Dynamic Standing Balance  Dynamic Standing-Comments: FAIR/FAIR-  Functional Assessments:  Bed Mobility  Bed Mobility:  (SUPINE>SIT SBA, HOB 30 DEGREES, SITS EOB SBA TOTAL 6 MIN)    Transfers  Transfer:  (SIT<>STAND SBA- HAD HIM STAND 2 MIN BEFORE MOBILIZING,NO C/O LIGHTHEADEDNESS)    Ambulation/Gait Training  Ambulation/Gait Training Performed:  (AMB IN [place] , AMB SLOW, DELIBERATE, 3 NEAR LOB, FATIGUED AT END OF AMB)  Extremity/Trunk Assessments:     Outcome Measures:  [facility] Basic Mobility  Turning from your back to your side while in a flat bed without using bedrails: None  Moving  from lying on your back to sitting on the side of a flat bed without using bedrails: None  Moving to and from bed to chair (including a wheelchair): A little  Standing up from a chair using your arms (e.g. wheelchair or bedside chair): A little  To walk in hospital room: A little  Climbing 3-5 steps with railing: A lot  Basic Mobility - Total Score: 19    Encounter Problems       Encounter Problems (Active)       Mobility       STG - Patient will ambulate (Not Progressing)       Start:  09/17/24    Expected End:  09/25/24       200+ FT PACING ACTIVITY FOR BEST ENERGY CONSERVATION, SBA, APPROPRIATE DME PRN         STG - Patient will ascend and descend four to six stairs (Not Progressing)       Start:  09/17/24    Expected End:  09/27/24       RAIL SBA         Goal 1 (Not Progressing)       Start:  09/17/24    Expected End:  10/08/24       20+ REPS RROM INCREASING STRENGTH AND BALANCE ACTIVITIES TO IMPROVE GAIT STABILTIY            Pain - Adult              Education Documentation  Mobility Training, taught by Luh Bustillos PT at 9/17/2024  3:09 PM.  Learner: Patient  Readiness: Acceptance  Method: Explanation  Response: Verbalizes Understanding, Needs Reinforcement  Comment: PACING ACTIVITY, MOBILITY SAFETY, ASSIST W/ [place] AMB    Education Comments  No comments found.

## 2024-09-17 NOTE — ASSESSMENT & PLAN NOTE
-No acute etiologies evident on CTA chest/abdomen/pelvis  -No systemic infectious symptoms reported per patient or wife  -Will check stool pathogen panel PCR in addition to C. difficile PCR; with episodic gray-colored stool may consider additional evaluation such as hepatitis panel but will defer to GI  -Continue supportive care and fluid resuscitation  -GI consultation appreciated  9/17: States he has pain whenever he eats solid foods or thickened liquids.  Can only tolerate thin liquids.  This does not appear to be associated with loose stools.  Having about 1-2 gray-colored stools per day, states they are watery.  Await input from GI.  Stool studies have been sent but he has had no BM since admitted.

## 2024-09-17 NOTE — CARE PLAN
Problem: Mobility  Goal: STG - Patient will ambulate  Description: 200+ FT PACING ACTIVITY FOR BEST ENERGY CONSERVATION, SBA, APPROPRIATE DME PRN  Outcome: Not Progressing  Goal: STG - Patient will ascend and descend four to six stairs  Description: RAIL SBA  Outcome: Not Progressing  Goal: Goal 1  Description: 20+ REPS RROM INCREASING STRENGTH AND BALANCE ACTIVITIES TO IMPROVE GAIT STABILTIY  Outcome: Not Progressing

## 2024-09-18 VITALS
WEIGHT: 165 LBS | SYSTOLIC BLOOD PRESSURE: 122 MMHG | BODY MASS INDEX: 23.62 KG/M2 | TEMPERATURE: 98.6 F | OXYGEN SATURATION: 96 % | RESPIRATION RATE: 16 BRPM | DIASTOLIC BLOOD PRESSURE: 75 MMHG | HEIGHT: 70 IN | HEART RATE: 63 BPM

## 2024-09-18 PROBLEM — K52.9 GASTROENTERITIS: Status: ACTIVE | Noted: 2024-09-17

## 2024-09-18 LAB
ANION GAP SERPL CALC-SCNC: 10 MMOL/L (ref 10–20)
BUN SERPL-MCNC: 21 MG/DL (ref 6–23)
CALCIUM SERPL-MCNC: 8 MG/DL (ref 8.6–10.3)
CHLORIDE SERPL-SCNC: 112 MMOL/L (ref 98–107)
CO2 SERPL-SCNC: 21 MMOL/L (ref 21–32)
CREAT SERPL-MCNC: 0.97 MG/DL (ref 0.5–1.3)
CRP SERPL-MCNC: <0.1 MG/DL
EGFRCR SERPLBLD CKD-EPI 2021: 87 ML/MIN/1.73M*2
ERYTHROCYTE [DISTWIDTH] IN BLOOD BY AUTOMATED COUNT: 14.5 % (ref 11.5–14.5)
GLUCOSE BLD MANUAL STRIP-MCNC: 189 MG/DL (ref 74–99)
GLUCOSE BLD MANUAL STRIP-MCNC: 213 MG/DL (ref 74–99)
GLUCOSE BLD MANUAL STRIP-MCNC: 296 MG/DL (ref 74–99)
GLUCOSE SERPL-MCNC: 120 MG/DL (ref 74–99)
HCT VFR BLD AUTO: 36.9 % (ref 41–52)
HGB BLD-MCNC: 12.3 G/DL (ref 13.5–17.5)
MCH RBC QN AUTO: 29.1 PG (ref 26–34)
MCHC RBC AUTO-ENTMCNC: 33.3 G/DL (ref 32–36)
MCV RBC AUTO: 87 FL (ref 80–100)
NRBC BLD-RTO: 0 /100 WBCS (ref 0–0)
PLATELET # BLD AUTO: 120 X10*3/UL (ref 150–450)
POTASSIUM SERPL-SCNC: 4.4 MMOL/L (ref 3.5–5.3)
RBC # BLD AUTO: 4.23 X10*6/UL (ref 4.5–5.9)
SODIUM SERPL-SCNC: 139 MMOL/L (ref 136–145)
WBC # BLD AUTO: 5.3 X10*3/UL (ref 4.4–11.3)

## 2024-09-18 PROCEDURE — 2500000004 HC RX 250 GENERAL PHARMACY W/ HCPCS (ALT 636 FOR OP/ED): Performed by: STUDENT IN AN ORGANIZED HEALTH CARE EDUCATION/TRAINING PROGRAM

## 2024-09-18 PROCEDURE — 97116 GAIT TRAINING THERAPY: CPT | Mod: GP,CQ

## 2024-09-18 PROCEDURE — 86140 C-REACTIVE PROTEIN: CPT | Performed by: INTERNAL MEDICINE

## 2024-09-18 PROCEDURE — 2500000001 HC RX 250 WO HCPCS SELF ADMINISTERED DRUGS (ALT 637 FOR MEDICARE OP): Performed by: STUDENT IN AN ORGANIZED HEALTH CARE EDUCATION/TRAINING PROGRAM

## 2024-09-18 PROCEDURE — 97110 THERAPEUTIC EXERCISES: CPT | Mod: GP,CQ

## 2024-09-18 PROCEDURE — 36415 COLL VENOUS BLD VENIPUNCTURE: CPT | Performed by: INTERNAL MEDICINE

## 2024-09-18 PROCEDURE — 99232 SBSQ HOSP IP/OBS MODERATE 35: CPT | Performed by: PHYSICIAN ASSISTANT

## 2024-09-18 PROCEDURE — G0378 HOSPITAL OBSERVATION PER HR: HCPCS

## 2024-09-18 PROCEDURE — 82310 ASSAY OF CALCIUM: CPT | Performed by: INTERNAL MEDICINE

## 2024-09-18 PROCEDURE — 2500000002 HC RX 250 W HCPCS SELF ADMINISTERED DRUGS (ALT 637 FOR MEDICARE OP, ALT 636 FOR OP/ED): Performed by: STUDENT IN AN ORGANIZED HEALTH CARE EDUCATION/TRAINING PROGRAM

## 2024-09-18 PROCEDURE — 85027 COMPLETE CBC AUTOMATED: CPT | Performed by: INTERNAL MEDICINE

## 2024-09-18 PROCEDURE — 96376 TX/PRO/DX INJ SAME DRUG ADON: CPT

## 2024-09-18 PROCEDURE — 99239 HOSP IP/OBS DSCHRG MGMT >30: CPT | Performed by: INTERNAL MEDICINE

## 2024-09-18 PROCEDURE — 2500000001 HC RX 250 WO HCPCS SELF ADMINISTERED DRUGS (ALT 637 FOR MEDICARE OP): Performed by: INTERNAL MEDICINE

## 2024-09-18 PROCEDURE — 82947 ASSAY GLUCOSE BLOOD QUANT: CPT

## 2024-09-18 ASSESSMENT — COGNITIVE AND FUNCTIONAL STATUS - GENERAL
TURNING FROM BACK TO SIDE WHILE IN FLAT BAD: A LITTLE
MOVING TO AND FROM BED TO CHAIR: A LITTLE
STANDING UP FROM CHAIR USING ARMS: A LITTLE
WALKING IN HOSPITAL ROOM: A LITTLE
MOBILITY SCORE: 24
DAILY ACTIVITIY SCORE: 24
MOBILITY SCORE: 18
CLIMB 3 TO 5 STEPS WITH RAILING: A LOT

## 2024-09-18 ASSESSMENT — PAIN - FUNCTIONAL ASSESSMENT: PAIN_FUNCTIONAL_ASSESSMENT: 0-10

## 2024-09-18 ASSESSMENT — PAIN SCALES - GENERAL: PAINLEVEL_OUTOF10: 0 - NO PAIN

## 2024-09-18 NOTE — PROGRESS NOTES
Social work consult placed for discharge planning. SW reviewed pt's chart and communicated with TCC. No SW needs foreseen at this time. SW signing off; available upon request.    Arsen Dominique, MSW, LSW ([id number])   Care Transitions

## 2024-09-18 NOTE — PROGRESS NOTES
Physical Therapy    Physical Therapy Treatment    Patient Name: Manish Lance  MRN: [id number]  Department: [facility] 2 E OBS  Room: [facility]231-A  Today's Date: 9/18/2024  Time Calculation  Start Time: 0851  Stop Time: 0918  Time Calculation (min): 27 min         Assessment/Plan   PT Assessment  End of Session Communication: Bedside nurse  Assessment Comment: patient  continues to have small LOB wiht change of direction and intial standing.  cotninue to work on balance for improved mobility  End of Session Patient Position: Bed, 3 rail up, Alarm off, not on at start of session  PT Plan  Inpatient/Swing Bed or Outpatient: Inpatient  PT Plan  Treatment/Interventions: Transfer training, Gait training, Stair training, Strengthening, Endurance training  PT Plan: Ongoing PT  PT Frequency: 4 times per week  PT Discharge Recommendations: Low intensity level of continued care  Equipment Recommended upon Discharge:  (TBD)  PT Recommended Transfer Status: Stand by assist (IN [place])  PT - OK to Discharge: Yes (WHEN MEDICALLY CLEARED)      General Visit Information:   PT  Visit  PT Received On: 09/18/24  Response to Previous Treatment: Patient reporting fatigue but able to participate.  General  Prior to Session Communication: Bedside nurse  Patient Position Received: Bed, 3 rail up, Alarm off, not on at start of session  General Comment:     Subjective   Precautions:       Vital Signs (Past 2hrs)                 Objective   Pain:  Pain Assessment  Pain Assessment: 0-10  0-10 (Numeric) Pain Score: 0 - No pain (no complaints of pain)  Cognition:  Cognition  Overall Cognitive Status: Within Functional Limits    Activity Tolerance:  Activity Tolerance  Endurance: Tolerates 30 min exercise with multiple rests  Treatments:  patient performed sit to st and with  small LOB self corrected CGA, gait training without device  500 feet with CGA and 2 small LOB self corrected but one required wall.  standing exercises  hip abduction,  marches 20  reps each with one hand for balance.  no dizziness      Outcome Measures:  [facility] Basic Mobility  Turning from your back to your side while in a flat bed without using bedrails: None  Moving from lying on your back to sitting on the side of a flat bed without using bedrails: A little  Moving to and from bed to chair (including a wheelchair): A little  Standing up from a chair using your arms (e.g. wheelchair or bedside chair): A little  To walk in hospital room: A little  Climbing 3-5 steps with railing: A lot  Basic Mobility - Total Score: 18    Education Documentation  Mobility Training, taught by Sowmya Ta PTA at 9/18/2024  9:38 AM.  Learner: Patient  Readiness: Acceptance  Method: Explanation  Response: Verbalizes Understanding    Education Comments  No comments found.        OP EDUCATION:       Encounter Problems       Encounter Problems (Active)       Mobility       STG - Patient will ambulate (Progressing)       Start:  09/17/24    Expected End:  09/25/24       200+ FT PACING ACTIVITY FOR BEST ENERGY CONSERVATION, SBA, APPROPRIATE DME PRN         STG - Patient will ascend and descend four to six stairs (Progressing)       Start:  09/17/24    Expected End:  09/27/24       RAIL SBA         Goal 1 (Progressing)       Start:  09/17/24    Expected End:  10/08/24       20+ REPS RROM INCREASING STRENGTH AND BALANCE ACTIVITIES TO IMPROVE GAIT STABILTIY            Pain - Adult

## 2024-09-18 NOTE — NURSING NOTE
Patel Llanos and Dr. Brant Smith via epic chat regarding order for q6 insulin. Order modified to be achs instead. Also reviewed home med list with paper copy with family at bedside. Patient requesting lyrica for neuropathy pain. Patient also requesting brilinta, states it is prescribed by Dr. Castillo, but it is not listed on the paper copy from [facility]. Dr. Smith added lyrica and brilinta and flomax and metoprolol from patient's med rec., which will be given tonight with atorvastatin and enoxaparin.

## 2024-09-18 NOTE — DISCHARGE SUMMARY
Discharge Diagnosis  Epigastric discomfort.  Appears to be chronic and recurrent.  Has had extensive workup in the past.  Has some duodenal inflammation but no ulcers.  SMA was patent when it was last assessed about a year ago.  Appreciate input from GI.  Continue symptomatic management.  Suspect may have been secondary to a gastroenteritis.  CRISSY secondary to hypovolemia.  Improving with IV fluids.  Anxiety and depression.  Type II DM with neuropathy.  Orthostatic hypotension, resolving with resolution of hyperkalemia in the next history of gastric bypass Nimenrix hyperlipidemia number next CAD in native artery number next hypertension    Issues Requiring Follow-Up  Follow-up with PCP.  Consider referral to GI if [facility] persist [facility] the [facility].    Discharge Meds     Medication List      ASK your doctor about these medications     allopurinol 300 mg tablet; Commonly known as: Zyloprim   amLODIPine 5 mg tablet; Commonly known as: Norvasc   aspirin 81 mg EC tablet   aspirin-acetaminophen-caffeine 250-250-65 mg tablet; Commonly known as:   Excedrin Migraine   atorvastatin 40 mg tablet; Commonly known as: Lipitor   buPROPion  mg 24 hr tablet; Commonly known as: Wellbutrin XL; Ask   about: Which instructions should I use?   cholecalciferol 50 MCG (2000 UT) tablet; Commonly known as: Vitamin D-3   DULoxetine 60 mg DR capsule; Commonly known as: Cymbalta   ezetimibe 10 mg tablet; Commonly known as: Zetia   finasteride 5 mg tablet; Commonly known as: Proscar; Take 1 tablet (5   mg) by mouth once daily. Do not crush, chew, or split.   Flomax 0.4 mg 24 hr capsule; Generic drug: tamsulosin   fluticasone 50 mcg/actuation nasal spray; Commonly known as: Flonase   Jardiance 25 mg; Generic drug: empagliflozin   Lantus U-100 Insulin 100 unit/mL injection; Generic drug: insulin   glargine   lisinopril 20 mg tablet   metFORMIN 1,000 mg tablet; Commonly known as: Glucophage   methocarbamol 500 mg tablet; Commonly known as:  Robaxin; Take 1 tablet   (500 mg) by mouth 2 times a day for 10 days.   metoclopramide 10 mg tablet; Commonly known as: Reglan; Take 1 tablet   (10 mg) by mouth every 6 hours for 7 days.   metoprolol tartrate 25 mg tablet; Commonly known as: Lopressor   naproxen 375 mg tablet; Commonly known as: Naprosyn   nitroglycerin 0.4 mg SL tablet; Commonly known as: Nitrostat   omeprazole 20 mg DR capsule; Commonly known as: PriLOSEC   ondansetron ODT 4 mg disintegrating tablet; Commonly known as:   Zofran-ODT; Take 1 tablet (4 mg) by mouth every 8 hours if needed for   nausea or vomiting.   pregabalin 150 mg capsule; Commonly known as: Lyrica   ProAir HFA 90 mcg/actuation inhaler; Generic drug: albuterol   TylenoL 325 mg tablet; Generic drug: acetaminophen       Test Results Pending At Discharge  Pending Labs       No current pending labs.            Hospital Course   alaina Lance is a 64 y.o. male with PMHx s/f HTN, HLD, CAD s/p PCI, aortic stenosis, BHARTI, IDDM-II c/b peripheral neuropathy, anxiety, depression, chronic lower back pain, GERD, and h/o gastric bypass (Billroth II) presenting with ongoing epigastric abdominal pain, poor appetite and diminished intake, and fairly regular episodes of diarrhea (non-bloody, but a few episodes of grayish discoloration). Pt reports that he is tired and does not feel very well, though slightly improved after Protonix injection in the ED, but asks his wife to provide most history. In short, he was seen in the ED about 3 weeks ago and was diagnosed with an infection/colitis, and was given antibiotics. Despite completing tx, still having persistent epigastric pain resulting in poor appetite and not wanting to eat from the pain. He has also not been drinking much. Has had multiple episodes of standing up and getting lightheaded/dizzy and passing out, but has never fallen because his wife has been able to catch him. Does have a prior history of this as well with both cardiac and  orthostatic syncope. He reports no fevers, chills, cp/pressure, palpitations, vomiting, diaphoresis, ha, vision changes, focal and/or lateralizing sensory or motor deficits.      Has previously seen Dr. Marti for EGD/Colonoscopy in setting of unexplained weight loss after a plateau after initial wt loss from his gastric bypass surgery. Mostly recently saw him and had results from EGD/colonoscopy 09/22/23.      ED Course (Summary):   Vitals on presentation: T98.5, BP 88/58 (improved to 108/74 with 2 L of fluid), HR 66, RR 18, SpO2 98% RA  Labs: CBC with WBC 7.2, Hgb 12.7, platelets 140.  CMP with glucose 179, sodium 133, potassium 5.0, BUN 44, serum creatinine 1.57.  Lipase 60.  High-sensitivity troponin 3.  PT/INR: 11.2/1.0.  UA with 1+ bacteria but negative nitrites and leukocyte esterase, only 1-5 WBCs.  Imaging: CTA chest/abdomen/pelvis is negative for overt acute process  Interventions: 2 L normal saline, 4 mg Zofran, 40 mg IV push pantoprazole, admitted to medicine for further management     9/17: Patient seen.  States he can only tolerate clear liquids.  States the eating thing thicker or solid will give him abdominal pain.  This does not necessarily give him diarrhea.  States he is having loose stools once or twice a day, states it appears to be gray in color.  No blood.  No fevers or chills.  States that antibiotics provided by ED did not help at all.  Has had multiple prior stents per cardiology.  Known to GI service.  Await input from GI, check serum lactic acid.  May want to consider mesenteric ischemia.    9/18: Patient seen.  Tolerating regular food.  Suspect gastroenteritis.  GI notes patient said extensive workup in the past including endoscopies and angiograms.  Only found some duodenal irritation despite abnormal imaging.  Does not feel that patient deserves another workup at this time.  Patient is now tolerating a normal consistency meal.  Anticipate discharge home and follow-up with GI as  outpatient.  PCP.    This discharge took greater than 35 minutes to arrange.    Pertinent Physical Exam At Time of Discharge  Physical Exam  Constitutional:       General: He is not in acute distress.  HENT:      Head: Normocephalic and atraumatic.      Nose: Nose normal. No congestion or rhinorrhea.      Mouth/Throat:      Mouth: Mucous membranes are dry.      Pharynx: Oropharynx is clear.   Eyes:      General: No scleral icterus.     Extraocular Movements: Extraocular movements intact.      Pupils: Pupils are equal, round, and reactive to light.   Cardiovascular:      Rate and Rhythm: Normal rate and regular rhythm.      Heart sounds: Normal heart sounds. No murmur heard.     No friction rub. No gallop.   Pulmonary:      Effort: Pulmonary effort is normal.      Breath sounds: Normal breath sounds. No wheezing, rhonchi or rales.   Chest:      Chest wall: No tenderness.   Abdominal:      General: There is no distension.      Palpations: Abdomen is soft.      Tenderness: There is no abdominal tenderness. There is no guarding or rebound.   Musculoskeletal:         General: No swelling, tenderness or signs of injury. Normal range of motion.      Cervical back: Normal range of motion.   Skin:     General: Skin is warm and dry.      Coloration: Skin is not jaundiced.      Findings: No bruising, erythema or rash.   Neurological:      General: No focal deficit present.      Mental Status: He is alert and oriented to person, place, and time.      Cranial Nerves: No cranial nerve deficit.      Sensory: No sensory deficit.      Coordination: Coordination normal.      Gait: Gait normal.      Deep Tendon Reflexes: Reflexes normal.         Outpatient Follow-Up  Future Appointments   Date Time Provider Department Center   9/23/2024  3:30 PM Alexis Castillo MD [id number] [facility]         Armando Mathis MD

## 2024-09-18 NOTE — NURSING NOTE
Order received for discharge to home. Iv d/cd. Home going instructions given to pt, stated understanding. Waiting for family to arrive to transport home.

## 2024-09-18 NOTE — CARE PLAN
Problem: Nutrition  Goal: Less than 5 days NPO/clear liquids  Outcome: Progressing  Goal: Oral intake greater than 50%  Outcome: Progressing  Goal: Oral intake greater 75%  Outcome: Progressing  Goal: Consume prescribed supplement  Outcome: Progressing  Goal: Adequate PO fluid intake  Outcome: Progressing  Goal: Nutrition support goals are met within 48 hrs  Outcome: Progressing  Goal: Nutrition support is meeting 75% of nutrient needs  Outcome: Progressing  Goal: Tube feed tolerance  Outcome: Progressing  Goal: BG  mg/dL  Outcome: Progressing  Goal: Lab values WNL  Outcome: Progressing  Goal: Electrolytes WNL  Outcome: Progressing  Goal: Promote healing  Outcome: Progressing  Goal: Maintain stable weight  Outcome: Progressing  Goal: Reduce weight from edema/fluid  Outcome: Progressing  Goal: Gradual weight gain  Outcome: Progressing  Goal: Improve ostomy output  Outcome: Progressing       The clinical goals for the shift include no falls or injury

## 2024-09-18 NOTE — PROGRESS NOTES
"  [facility] Gastroenterology Progress Note    ASSESSMENT and PLAN:       Manish Lance is a 64 y.o. male with a significant past medical history of coronary artery disease status post PCI on Brilinta, that is mellitus type II, show sleep apnea, AV stenosis, send hypertension, hyperlipidemia and depression who presented with dehydration weakness syncopal-like episode. GI was consulted for \" epigastric pain.\".     Epigastric pain  Patient with chronic epigastric pain and nausea. Patient with underwent EGD and colonoscopy 9/2023 with patent Billroth II gastrojejunostomy healthy-appearing mucosa mild erythema friability was found in the cardia biopsy showed gastric mucosa with change consistent with reactive gastropathy. CT abdomen pelvis from 8/19 slowed diffuse wall thickening edema small bowel loops and edema in the abdominal and pelvic fat second enteritis infectious versus inflammatory process. Repeat CT abdomen pelvis from 8/22 showed persistent mild wall thickening enhancement of the nondistended small bowel in the abdomen suggestive resolving enterocolitis possible minimal wall thickening in the ascending colon. CTA done admission did not show any acute pathology, no signs of stenosis of the SMA or celiac artery. RUQ US this admission was unremarkable. CRP normal, calprotectin pending.  Suspect probable functional dyspepsia.   -Continue supportive care with antiemetics PRN. Consider trial of dicyclomine for abodminal pain, although currently improved.   -No plans for endoscopic evaluation at this time unlikely to  as patient has extensive workup in the outpatient setting along with EGD and colonoscopy one 1 year prior that were both within normal limits so patient currently on Brilinta states last dose was 9/17  -Recommend outpatient follow up with Dr. Marti, who he has seen outpatient in the past    Case was reviewed with Dr. Wilson. Gi will sign off. Please call with questions or " "concerns.   Mala Melara PA-C        SUBJECTIVE and INTERVAL HISTORY:       Manish Lance  is a 64 y.o. male with a significant past medical history of coronary artery disease status post PCI on Brilinta, that is mellitus type II, show sleep apnea, AV stenosis, send hypertension, hyperlipidemia and depression who presented with dehydration weakness syncopal-like episode. GI was consulted for \" epigastric pain.\".     HPI:   Patient seen and examined. He ate a regular breakfast, had some mild nausea but no vomiting. He states that his abdominal pain has resolved this morning. No bowel movement so have not collected stool studies yet.    Review of systems:     10 point ROS obtained and negative other than discussed above.          OBJECTIVE:       Last Recorded Vitals:  Vitals:    09/17/24 2144 09/18/24 0048 09/18/24 0441 09/18/24 0949   BP: 118/72 114/74 116/70 112/71   BP Location: Right arm Right arm Right arm Right arm   Patient Position: Lying Lying Lying Lying   Pulse: 65 61 61 62   Resp: 18 18 18 16   Temp: 36.2 °C (97.1 °F) 35.9 °C (96.6 °F) 36 °C (96.8 °F) 36.4 °C (97.6 °F)   TempSrc: Temporal Temporal Temporal Temporal   SpO2: 96% 94% 97% 95%   Weight:       Height:         /71 (BP Location: Right arm, Patient Position: Lying)   Pulse 62   Temp 36.4 °C (97.6 °F) (Temporal)   Resp 16   Ht 1.778 m (5' 10\")   Wt 74.8 kg (165 lb)   SpO2 95%   BMI 23.68 kg/m²      Physical Exam:    Physical Exam      Inpatient Medications:  allopurinol, 300 mg, oral, Daily  aspirin, 81 mg, oral, Daily  atorvastatin, 40 mg, oral, Nightly  buPROPion XL, 150 mg, oral, q AM  DULoxetine, 60 mg, oral, Daily  enoxaparin, 40 mg, subcutaneous, q24h  insulin lispro, 0-10 Units, subcutaneous, Before meals & nightly  lisinopril, 20 mg, oral, Daily  metoprolol tartrate, 25 mg, oral, BID  pantoprazole, 40 mg, oral, Daily before breakfast   Or  pantoprazole, 40 mg, intravenous, Daily before breakfast  pantoprazole, 40 mg, " intravenous, Daily  polyethylene glycol, 17 g, oral, Daily  pregabalin, 150 mg, oral, BID  tamsulosin, 0.4 mg, oral, Nightly  ticagrelor, 90 mg, oral, BID      PRN medications: acetaminophen, albuterol, bisacodyl, bisacodyl, dextrose, dextrose, glucagon, glucagon, guaiFENesin, melatonin, ondansetron **OR** ondansetron, promethazine    Outpatient Medications:  Prior to Admission medications    Medication Sig Start Date End Date Taking? Authorizing Provider   acetaminophen (TylenoL) 325 mg tablet Take 1 tablet (325 mg) by mouth every 6 hours if needed. 7/14/17   Historical Provider, MD   albuterol (ProAir HFA) 90 mcg/actuation inhaler INHALE 1-2 PUFFS QID PRN SHORTNESS OR BREATH/WHEEZING 7/14/17   Historical Provider, MD   allopurinol (Zyloprim) 300 mg tablet Take 1 tablet (300 mg) by mouth once daily. 4/1/19   Historical Provider, MD   amLODIPine (Norvasc) 5 mg tablet Take 1 tablet (5 mg) by mouth once daily. 10/12/22   Historical Provider, MD   aspirin 81 mg EC tablet Take 1 tablet (81 mg) by mouth once daily.    Historical Provider, MD   aspirin-acetaminophen-caffeine (Excedrin Migraine) 250-250-65 mg tablet Take 1 tablet by mouth if needed for headaches.    Historical Provider, MD   atorvastatin (Lipitor) 40 mg tablet Take 1 tablet (40 mg) by mouth once daily at bedtime. 7/6/17   Historical Provider, MD   buPROPion XL (Wellbutrin XL) 150 mg 24 hr tablet Take 1 tablet (150 mg) by mouth once daily in the morning. Do not crush, chew, or split.    Historical Provider, MD   cholecalciferol (Vitamin D-3) 50 MCG (2000 UT) tablet Take 1 tablet (2,000 Units) by mouth once daily. CLARIFY DIRECTIONS 10/12/22   Historical Provider, MD   DULoxetine (Cymbalta) 60 mg DR capsule Take 1 capsule (60 mg) by mouth once daily. 10/12/22   Historical Provider, MD   empagliflozin (Jardiance) 25 mg Take 1 tablet (25 mg) by mouth once daily. 10/12/22   Historical Provider, MD   ezetimibe (Zetia) 10 mg tablet Take 1 tablet (10 mg) by mouth  once daily. 1/21/21   Historical Provider, MD   finasteride (Proscar) 5 mg tablet Take 1 tablet (5 mg) by mouth once daily. Do not crush, chew, or split.  Patient not taking: Reported on 9/17/2024 10/28/23 10/27/24  Panchito Ochoa MD PhD   fluticasone (Flonase) 50 mcg/actuation nasal spray Administer 2 sprays into each nostril once daily at bedtime. CLARIFY DIRECTIONS 10/12/22   Historical Provider, MD   insulin glargine (Lantus U-100 Insulin) 100 unit/mL injection Inject 29 Units under the skin once daily in the morning.    Historical Provider, MD   lisinopril 20 mg tablet Take 1 tablet (20 mg) by mouth once daily. 1/21/21   Historical Provider, MD   metFORMIN (Glucophage) 1,000 mg tablet Take 1 tablet (1,000 mg) by mouth every 12 hours. 7/14/16   Historical Provider, MD   methocarbamol (Robaxin) 500 mg tablet Take 1 tablet (500 mg) by mouth 2 times a day for 10 days.  Patient not taking: Reported on 9/17/2024 10/19/23 12/18/23  Armando Mao, APRN-CNP   metoclopramide (Reglan) 10 mg tablet Take 1 tablet (10 mg) by mouth every 6 hours for 7 days.  Patient not taking: Reported on 9/17/2024 8/22/24 8/29/24  Jessica Harris MD   metoprolol tartrate (Lopressor) 25 mg tablet Take 1 tablet (25 mg) by mouth 2 times a day. 4/1/19   Historical Provider, MD   naproxen (Naprosyn) 375 mg tablet Take 1 tablet (375 mg) by mouth 2 times daily (morning and late afternoon). 5/13/24   Historical Provider, MD   nitroglycerin (Nitrostat) 0.4 mg SL tablet Place 1 tablet (0.4 mg) under the tongue every 5 minutes if needed for chest pain (MAX THREE TABS PER EPISODE). PLACE 1 TABLET UNDER THE TONGUE EVERY 5 MINUTES FOR UP TO 3 DOSES AS NEEDED FOR CHEST PAIN.CALL 911 IF PAIN PERSISTS. 6/6/18   Historical Provider, MD   omeprazole (PriLOSEC) 20 mg DR capsule Take 1 capsule (20 mg) by mouth twice a day. 10/12/22   Historical Provider, MD   ondansetron ODT (Zofran-ODT) 4 mg disintegrating tablet Take 1 tablet (4 mg) by mouth every 8  hours if needed for nausea or vomiting.  Patient not taking: Reported on 9/17/2024 8/20/24   Beverley Powell DO   pregabalin (Lyrica) 150 mg capsule Take 1 capsule (150 mg) by mouth 2 times a day. 10/12/22   Historical Provider, MD   tamsulosin (Flomax) 0.4 mg 24 hr capsule Take 1 capsule (0.4 mg) by mouth once daily at bedtime. 7/14/16   Historical Provider, MD   buPROPion SR (Wellbutrin SR) 150 mg 12 hr tablet Take 1 tablet (150 mg) by mouth once daily. 10/12/22 9/17/24  Historical Provider, MD   colchicine 0.6 mg tablet Take by mouth.  9/17/24  Historical Provider, MD   docusate sodium (Colace) 100 mg capsule Take 1 capsule (100 mg) by mouth 2 times a day.  9/17/24  Historical Provider, MD   ferrous sulfate 325 (65 Fe) MG tablet Take 1 tablet by mouth 3 times a day. 10/12/22 9/17/24  Historical Provider, MD   glipiZIDE (Glucotrol) 5 mg tablet Take 1 tablet (5 mg) by mouth once daily.  9/17/24  Historical Provider, MD   melatonin 3 mg capsule Take by mouth. 1-2 TABLETS NIGHTLY IF NEEDED 10/12/22 9/17/24  Historical Provider, MD   ticagrelor (Brilinta) 90 mg tablet Take 1 tablet (90 mg) by mouth twice a day. 7/6/17 9/17/24  Historical Provider, MD   traMADol (Ultram) 50 mg tablet Take 1 tablet (50 mg) by mouth every 6 hours if needed for severe pain (7 - 10).  9/17/24  Historical Provider, MD   traZODone (Desyrel) 50 mg tablet Take by mouth.  9/17/24  Historical Provider, MD       LABS AND IMAGING:     Labs:  Recent labs reviewed in the EMR.    Lab Results   Component Value Date    WBC 5.3 09/18/2024    HGB 12.3 (L) 09/18/2024    HGB 11.8 (L) 09/17/2024    HGB 12.7 (L) 09/16/2024    MCV 87 09/18/2024     (L) 09/18/2024     (L) 09/17/2024     (L) 09/16/2024       Lab Results   Component Value Date     09/18/2024    K 4.4 09/18/2024     (H) 09/18/2024    BUN 21 09/18/2024    CREATININE 0.97 09/18/2024    CREATININE 1.34 (H) 09/17/2024       Lab Results   Component Value Date    BILITOT 0.3  09/17/2024    BILITOT 0.3 09/16/2024    ALKPHOS 50 09/17/2024    ALKPHOS 61 09/16/2024    AST 29 09/17/2024    AST 35 09/16/2024    ALT 48 09/17/2024    ALT 60 (H) 09/16/2024    LIPASE 60 09/16/2024       Lab Results   Component Value Date    CRP <0.10 09/18/2024         Imaging:  Recent imaging results reviewed.

## 2024-09-18 NOTE — PROGRESS NOTES
09/18/24 1201   Discharge Planning   Living Arrangements Spouse/significant other   Support Systems Spouse/significant other   Assistance Needed ADL's   Type of Residence Private residence   Home or Post Acute Services In home services   Type of Home Care Services Home nursing visits;Home OT;Home PT   Expected Discharge Disposition Home H   Does the patient need discharge transport arranged? Yes   RoundTrip coordination needed? Yes   Patient Choice   Provider Choice list and CMS website (https://medicare.gov/care-compare#search) for post-acute Quality and Resource Measure Data were provided and reviewed with: Patient   Patient / Family choosing to utilize agency / facility established prior to hospitalization No     Discharge assessment complete. Patient lives at home with wife and grandson. Patient confirms PCP as RAFAT Savage. Patient states that he is overall independent. Patient confirms that he still drives himself and has no issues with getting to and from appointments. Advised patient in regards to [facility] recommendations. The patient was provided with a Careport list of skilled HHC agencies that are in network with patient's insurance payor, service patient's preferred geographic region, and that displays CMS star ratings.  Patient chose [facility]. Referral sent to [facility]. [facility] following.

## 2024-09-18 NOTE — CONSULTS
Nutrition Initial Assessment:   Nutrition Assessment    Reason for Assessment: Admission nursing screening    Medical history per chart:     64 y.o. male with PMHx s/f HTN, HLD, CAD s/p PCI, aortic stenosis, BHARTI, IDDM-II c/b peripheral neuropathy, anxiety, depression, chronic lower back pain, GERD, and h/o gastric bypass (Billroth II) presenting with ongoing epigastric abdominal pain, poor appetite and diminished intake, and fairly regular episodes of diarrhea (non-bloody, but a few episodes of grayish discoloration).     9/18:  Pt reports poor intake for past month due to N/V/D.  Pt with a hx of gastric bypass around 25 years ago, but has been eating regular meals.  Pt has been avoiding hard to digest foods such has meats.  Pt's weight has been stable in past year, but reports previous weight loss. Current diet is Full liquids, and pt tolerating.  Pt requesting diet advancement, and agreeable to supplements with meals.     Nutrition History:  Energy Intake: Poor < 50 %  Food and Nutrient History: Decreased PO intake for the past month  Vitamin/Herbal Supplement Use: Hasn't tried any supplements yet       Current Diet: Adult diet Full Liquid    Average meal Intake during admission: <25%       Nutrition Related Findings:   Oral Symptoms: none     GI symptoms: anorexia, nausea, and diarrhea.   BM:    Food allergies: NKFA. has No Known Allergies.  Meds/Labs reviewed.  allopurinol, 300 mg, oral, Daily  aspirin, 81 mg, oral, Daily  atorvastatin, 40 mg, oral, Nightly  buPROPion XL, 150 mg, oral, q AM  DULoxetine, 60 mg, oral, Daily  enoxaparin, 40 mg, subcutaneous, q24h  insulin lispro, 0-10 Units, subcutaneous, Before meals & nightly  lisinopril, 20 mg, oral, Daily  metoprolol tartrate, 25 mg, oral, BID  pantoprazole, 40 mg, oral, Daily before breakfast   Or  pantoprazole, 40 mg, intravenous, Daily before breakfast  pantoprazole, 40 mg, intravenous, Daily  polyethylene glycol, 17 g, oral, Daily  pregabalin, 150 mg, oral,  "BID  tamsulosin, 0.4 mg, oral, Nightly  ticagrelor, 90 mg, oral, BID             Nutrition Significant Labs:    Results from last 7 days   Lab Units 09/18/24  0456 09/17/24  0422 09/16/24  1916   GLUCOSE mg/dL 120* 139* 179*   SODIUM mmol/L 139 137 133*   POTASSIUM mmol/L 4.4 4.5 5.0   CHLORIDE mmol/L 112* 111* 106   CO2 mmol/L 21 20* 18*   BUN mg/dL 21 37* 44*   CREATININE mg/dL 0.97 1.34* 1.57*   EGFR mL/min/1.73m*2 87 59* 49*   CALCIUM mg/dL 8.0* 8.0* 8.3*   MAGNESIUM mg/dL  --  1.73  --      Lab Results   Component Value Date    HGBA1C 10.3 (H) 10/28/2023    HGBA1C 8.4 (A) 04/27/2023     Results from last 7 days   Lab Units 09/17/24  2143 09/17/24  1616 09/17/24  1045 09/17/24  0425   POCT GLUCOSE mg/dL 135* 128* 135* 139*       Anthropometrics:  Height: 177.8 cm (5' 10\")   Weight: 74.8 kg (165 lb)   BMI (Calculated): 23.68  IBW/kg (Dietitian Calculated): 75 kg          Weight History:   Wt Readings from Last 10 Encounters:   09/16/24 74.8 kg (165 lb)   08/22/24 73.5 kg (162 lb)   08/19/24 74.8 kg (165 lb)   12/18/23 72.6 kg (160 lb)   10/30/23 74.6 kg (164 lb 6.4 oz)   10/27/23 72.6 kg (160 lb)   10/18/23 72.6 kg (160 lb)   09/12/23 72.6 kg (160 lb)   08/02/23 72.6 kg (160 lb)   06/01/23 72.6 kg (160 lb 1.6 oz)        Weight Change %:  Weight History / % Weight Change: Per records weight has been stable over the past year  Significant Weight Loss: No          Nutrition Focused Physical Exam Findings:    Subcutaneous Fat Loss:   Orbital Fat Pads: Mild-Moderate (slight dark circles and slight hollowing)  Buccal Fat Pads: Well nourished (full, rounded cheeks)  Triceps: Mild-Moderate (less than ample fat tissue)  Muscle Wasting:  Temporalis: Mild-Moderate (slight depression)  Pectoralis (Clavicular Region): Mild-Moderate (some protrusion of clavicle)  Interosseous: Mild-Moderate (slightly depressed area between thumb and forefinger)  Edema:     Physical Findings:  Skin: Negative    Estimated Needs:   Total Energy " Estimated Needs (kCal): 2250 kCal  Method for Estimating Needs: 30 kcal/kg     Method for Estimating Needs:  gm (1.2-1.4 gm/kg)     Method for Estimating Needs: 1ml/kcal        Nutrition Diagnosis   Nutrition Diagnosis:  Malnutrition Diagnosis  Patient has Malnutrition Diagnosis: Yes  Malnutrition Diagnosis: Moderate malnutrition related to acute disease or injury  As Evidenced by: mild to moderate muscle/fat loss per NFPE, and PO intake <75% for >7 days    Nutrition Diagnosis  Patient has Nutrition Diagnosis: Yes  Diagnosis Status (1): New  Nutrition Diagnosis 1: Predicted inadequate energy intake  Related to (1): abd pain, N/V/D  As Evidenced by (1): PO intake <75%       Nutrition Interventions/Recommendations   Nutrition Interventions and Recommendations:        Nutrition Prescription:  Individualized Nutrition Prescription Provided for : Diet advancement, and supplements        Nutrition Interventions:   Food and/or Nutrient Delivery Interventions  Interventions: Medical food supplement  Medical Food Supplement: Commercial beverage  Goal: Strawberry Glucerna shakes BID  Additional Interventions: Advance diet to Consistent carbohydrate 75 gm/meal    Coordination of Nutrition Care by a Nutrition Professional  Collaboration and Referral of Nutrition Care: Collaboration by nutrition professional with other providers  Goal: Dr. Mathis    Nutrition Education:   Education Documentation  No documentation found.      Nutrition Counseling  Counseling Theoretical Approach: Nutrition counseling based on health belief model  Goal: Reviewed supplements, diet       Nutrition Monitoring and Evaluation   Monitoring/Evaluation:   Food/Nutrient Related History Monitoring  Monitoring and Evaluation Plan: Energy intake  Energy Intake: Estimated energy intake  Criteria: Diet advancement, tolerance, and intake >50%    Body Composition/Growth/Weight History  Monitoring and Evaluation Plan: Weight  Weight: Measured weight  Criteria:  Stable weight    Biochemical Data, Medical Tests and Procedures  Monitoring and Evaluation Plan: Electrolyte/renal panel, Glucose/endocrine profile  Electrolyte and Renal Panel: BUN, Sodium, Calcium, serum, Chloride, Creatinine, Magnesium, Phosphorus, Potassium  Criteria: WNL  Glucose/Endocrine Profile: Glucose, casual, Hemoglobin A1c (HgbA1c)  Criteria: Improving    Nutrition Focused Physical Findings  Monitoring and Evaluation Plan: Skin  Skin: Other (Comment)  Criteria: Promote intact skin            Time Spent/Follow-up Reminder:   Follow Up  Time Spent (min): 45 minutes  Last Date of Nutrition Visit: 09/18/24  Nutrition Follow-Up Needed?: Dietitian to reassess per policy  Follow up Comment: 9/20-9/23

## 2024-09-19 ENCOUNTER — TELEPHONE (OUTPATIENT)
Dept: GASTROENTEROLOGY | Facility: CLINIC | Age: 64
End: 2024-09-19
Payer: OTHER GOVERNMENT

## 2024-09-19 NOTE — TELEPHONE ENCOUNTER
----- Message from Da FU sent at 9/19/2024  9:33 AM EDT -----  Regarding: RE: Hospital follow up  Scheduled patient for 10/21/24 at 4:00.  Unable to reach patient by phone.  Sent him a [employer] message and letter via [employer].  ----- Message -----  From: Mala Melara PA-C  Sent: 9/18/2024  11:18 AM EDT  To: Da Ng  Subject: Hospital follow up                               Please make patient a hospital follow up with Dr. Marti to discuss chronic abdominal pain.

## 2024-09-20 LAB
ATRIAL RATE: 63 BPM
P AXIS: 35 DEGREES
PR INTERVAL: 178 MS
Q ONSET: 253 MS
QRS COUNT: 10 BEATS
QRS DURATION: 91 MS
QT INTERVAL: 400 MS
QTC CALCULATION(BAZETT): 407 MS
QTC FREDERICIA: 404 MS
R AXIS: 26 DEGREES
T AXIS: 27 DEGREES
T OFFSET: 453 MS
VENTRICULAR RATE: 62 BPM

## 2024-10-04 ENCOUNTER — APPOINTMENT (OUTPATIENT)
Dept: CARDIOLOGY | Facility: HOSPITAL | Age: 64
DRG: 312 | End: 2024-10-04
Payer: OTHER GOVERNMENT

## 2024-10-04 ENCOUNTER — APPOINTMENT (OUTPATIENT)
Dept: RADIOLOGY | Facility: HOSPITAL | Age: 64
DRG: 312 | End: 2024-10-04
Payer: OTHER GOVERNMENT

## 2024-10-04 ENCOUNTER — HOSPITAL ENCOUNTER (INPATIENT)
Facility: HOSPITAL | Age: 64
LOS: 1 days | Discharge: HOME | End: 2024-10-06
Attending: STUDENT IN AN ORGANIZED HEALTH CARE EDUCATION/TRAINING PROGRAM | Admitting: STUDENT IN AN ORGANIZED HEALTH CARE EDUCATION/TRAINING PROGRAM
Payer: OTHER GOVERNMENT

## 2024-10-04 DIAGNOSIS — I25.10 CAD IN NATIVE ARTERY: ICD-10-CM

## 2024-10-04 DIAGNOSIS — R55 SYNCOPE, UNSPECIFIED SYNCOPE TYPE: ICD-10-CM

## 2024-10-04 DIAGNOSIS — R55 SYNCOPE AND COLLAPSE: Primary | ICD-10-CM
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
## 2024-10-04 DIAGNOSIS — S09.90XA CLOSED HEAD INJURY, INITIAL ENCOUNTER: ICD-10-CM

## 2024-10-04 DIAGNOSIS — R06.09 DOE (DYSPNEA ON EXERTION): ICD-10-CM

## 2024-10-04 LAB
ALBUMIN SERPL BCP-MCNC: 4.1 G/DL (ref 3.4–5)
ALP SERPL-CCNC: 57 U/L (ref 33–136)
ALT SERPL W P-5'-P-CCNC: 87 U/L (ref 10–52)
ANION GAP SERPL CALC-SCNC: 15 MMOL/L (ref 10–20)
AST SERPL W P-5'-P-CCNC: 58 U/L (ref 9–39)
BASOPHILS # BLD AUTO: 0.07 X10*3/UL (ref 0–0.1)
BASOPHILS NFR BLD AUTO: 1 %
BILIRUB SERPL-MCNC: 0.4 MG/DL (ref 0–1.2)
BNP SERPL-MCNC: 10 PG/ML (ref 0–99)
BUN SERPL-MCNC: 25 MG/DL (ref 6–23)
CALCIUM SERPL-MCNC: 8.9 MG/DL (ref 8.6–10.3)
CARDIAC TROPONIN I PNL SERPL HS: 3 NG/L (ref 0–20)
CARDIAC TROPONIN I PNL SERPL HS: <3 NG/L (ref 0–20)
CHLORIDE SERPL-SCNC: 106 MMOL/L (ref 98–107)
CO2 SERPL-SCNC: 20 MMOL/L (ref 21–32)
CREAT SERPL-MCNC: 1.43 MG/DL (ref 0.5–1.3)
EGFRCR SERPLBLD CKD-EPI 2021: 55 ML/MIN/1.73M*2
EOSINOPHIL # BLD AUTO: 0.63 X10*3/UL (ref 0–0.7)
EOSINOPHIL NFR BLD AUTO: 9.4 %
ERYTHROCYTE [DISTWIDTH] IN BLOOD BY AUTOMATED COUNT: 14.6 % (ref 11.5–14.5)
GLUCOSE SERPL-MCNC: 240 MG/DL (ref 74–99)
HCT VFR BLD AUTO: 39.5 % (ref 41–52)
HGB BLD-MCNC: 12.9 G/DL (ref 13.5–17.5)
IMM GRANULOCYTES # BLD AUTO: 0.02 X10*3/UL (ref 0–0.7)
IMM GRANULOCYTES NFR BLD AUTO: 0.3 % (ref 0–0.9)
LACTATE SERPL-SCNC: 3.1 MMOL/L (ref 0.4–2)
LACTATE SERPL-SCNC: 3.6 MMOL/L (ref 0.4–2)
LIPASE SERPL-CCNC: 47 U/L (ref 9–82)
LYMPHOCYTES # BLD AUTO: 1.91 X10*3/UL (ref 1.2–4.8)
LYMPHOCYTES NFR BLD AUTO: 28.6 %
MAGNESIUM SERPL-MCNC: 1.72 MG/DL (ref 1.6–2.4)
MCH RBC QN AUTO: 28.9 PG (ref 26–34)
MCHC RBC AUTO-ENTMCNC: 32.7 G/DL (ref 32–36)
MCV RBC AUTO: 89 FL (ref 80–100)
MONOCYTES # BLD AUTO: 0.5 X10*3/UL (ref 0.1–1)
MONOCYTES NFR BLD AUTO: 7.5 %
NEUTROPHILS # BLD AUTO: 3.56 X10*3/UL (ref 1.2–7.7)
NEUTROPHILS NFR BLD AUTO: 53.2 %
NRBC BLD-RTO: 0 /100 WBCS (ref 0–0)
PLATELET # BLD AUTO: 148 X10*3/UL (ref 150–450)
POTASSIUM SERPL-SCNC: 4.7 MMOL/L (ref 3.5–5.3)
PROT SERPL-MCNC: 6.4 G/DL (ref 6.4–8.2)
RBC # BLD AUTO: 4.46 X10*6/UL (ref 4.5–5.9)
SODIUM SERPL-SCNC: 136 MMOL/L (ref 136–145)
WBC # BLD AUTO: 6.7 X10*3/UL (ref 4.4–11.3)

## 2024-10-04 PROCEDURE — 2500000004 HC RX 250 GENERAL PHARMACY W/ HCPCS (ALT 636 FOR OP/ED): Performed by: STUDENT IN AN ORGANIZED HEALTH CARE EDUCATION/TRAINING PROGRAM

## 2024-10-04 PROCEDURE — 72125 CT NECK SPINE W/O DYE: CPT

## 2024-10-04 PROCEDURE — 83690 ASSAY OF LIPASE: CPT | Performed by: STUDENT IN AN ORGANIZED HEALTH CARE EDUCATION/TRAINING PROGRAM

## 2024-10-04 PROCEDURE — 84484 ASSAY OF TROPONIN QUANT: CPT | Performed by: STUDENT IN AN ORGANIZED HEALTH CARE EDUCATION/TRAINING PROGRAM

## 2024-10-04 PROCEDURE — 83735 ASSAY OF MAGNESIUM: CPT | Performed by: STUDENT IN AN ORGANIZED HEALTH CARE EDUCATION/TRAINING PROGRAM

## 2024-10-04 PROCEDURE — 83605 ASSAY OF LACTIC ACID: CPT | Performed by: STUDENT IN AN ORGANIZED HEALTH CARE EDUCATION/TRAINING PROGRAM

## 2024-10-04 PROCEDURE — 36415 COLL VENOUS BLD VENIPUNCTURE: CPT | Performed by: STUDENT IN AN ORGANIZED HEALTH CARE EDUCATION/TRAINING PROGRAM

## 2024-10-04 PROCEDURE — 72125 CT NECK SPINE W/O DYE: CPT | Performed by: STUDENT IN AN ORGANIZED HEALTH CARE EDUCATION/TRAINING PROGRAM

## 2024-10-04 PROCEDURE — 99285 EMERGENCY DEPT VISIT HI MDM: CPT

## 2024-10-04 PROCEDURE — 70450 CT HEAD/BRAIN W/O DYE: CPT | Performed by: STUDENT IN AN ORGANIZED HEALTH CARE EDUCATION/TRAINING PROGRAM

## 2024-10-04 PROCEDURE — 70450 CT HEAD/BRAIN W/O DYE: CPT

## 2024-10-04 PROCEDURE — 85025 COMPLETE CBC W/AUTO DIFF WBC: CPT | Performed by: STUDENT IN AN ORGANIZED HEALTH CARE EDUCATION/TRAINING PROGRAM

## 2024-10-04 PROCEDURE — 83880 ASSAY OF NATRIURETIC PEPTIDE: CPT | Performed by: STUDENT IN AN ORGANIZED HEALTH CARE EDUCATION/TRAINING PROGRAM

## 2024-10-04 PROCEDURE — 80053 COMPREHEN METABOLIC PANEL: CPT | Performed by: STUDENT IN AN ORGANIZED HEALTH CARE EDUCATION/TRAINING PROGRAM

## 2024-10-04 PROCEDURE — 93005 ELECTROCARDIOGRAM TRACING: CPT

## 2024-10-04 RX ADMIN — SODIUM CHLORIDE 500 ML: 9 INJECTION, SOLUTION INTRAVENOUS at 21:56

## 2024-10-04 RX ADMIN — SODIUM CHLORIDE, POTASSIUM CHLORIDE, SODIUM LACTATE AND CALCIUM CHLORIDE 1000 ML: 600; 310; 30; 20 INJECTION, SOLUTION INTRAVENOUS at 23:41

## 2024-10-04 ASSESSMENT — LIFESTYLE VARIABLES
HAVE PEOPLE ANNOYED YOU BY CRITICIZING YOUR DRINKING: NO
TOTAL SCORE: 0
EVER FELT BAD OR GUILTY ABOUT YOUR DRINKING: NO
EVER HAD A DRINK FIRST THING IN THE MORNING TO STEADY YOUR NERVES TO GET RID OF A HANGOVER: NO
HAVE YOU EVER FELT YOU SHOULD CUT DOWN ON YOUR DRINKING: NO

## 2024-10-04 ASSESSMENT — PAIN SCALES - GENERAL
PAINLEVEL_OUTOF10: 0 - NO PAIN
PAINLEVEL_OUTOF10: 0 - NO PAIN

## 2024-10-05 ENCOUNTER — APPOINTMENT (OUTPATIENT)
Dept: CARDIOLOGY | Facility: HOSPITAL | Age: 64
End: 2024-10-05
Payer: OTHER GOVERNMENT

## 2024-10-05 ENCOUNTER — APPOINTMENT (OUTPATIENT)
Dept: RADIOLOGY | Facility: HOSPITAL | Age: 64
DRG: 312 | End: 2024-10-05
Payer: OTHER GOVERNMENT

## 2024-10-05 PROBLEM — R74.01 TRANSAMINITIS: Status: ACTIVE | Noted: 2024-10-05

## 2024-10-05 PROBLEM — E87.20 LACTIC ACIDOSIS: Status: ACTIVE | Noted: 2024-10-05

## 2024-10-05 PROBLEM — D69.6 THROMBOCYTOPENIA (CMS-HCC): Status: ACTIVE | Noted: 2024-10-05

## 2024-10-05 PROBLEM — I95.9 HYPOTENSION: Status: ACTIVE | Noted: 2023-01-27

## 2024-10-05 PROBLEM — D64.9 NORMOCYTIC ANEMIA: Status: ACTIVE | Noted: 2024-10-05

## 2024-10-05 PROBLEM — R73.9 HYPERGLYCEMIA: Status: ACTIVE | Noted: 2024-10-05

## 2024-10-05 PROBLEM — R55 SYNCOPE AND COLLAPSE: Status: ACTIVE | Noted: 2024-10-05

## 2024-10-05 LAB
ANION GAP SERPL CALC-SCNC: 9 MMOL/L (ref 10–20)
AORTIC VALVE MEAN GRADIENT: 17 MMHG
AORTIC VALVE PEAK VELOCITY: 2.82 M/S
AV PEAK GRADIENT: 31.8 MMHG
AVA (PEAK VEL): 1.06 CM2
AVA (VTI): 0.99 CM2
BUN SERPL-MCNC: 24 MG/DL (ref 6–23)
CALCIUM SERPL-MCNC: 8.7 MG/DL (ref 8.6–10.3)
CHLORIDE SERPL-SCNC: 108 MMOL/L (ref 98–107)
CO2 SERPL-SCNC: 25 MMOL/L (ref 21–32)
CREAT SERPL-MCNC: 1.25 MG/DL (ref 0.5–1.3)
EGFRCR SERPLBLD CKD-EPI 2021: 64 ML/MIN/1.73M*2
EJECTION FRACTION APICAL 4 CHAMBER: 60.6
EJECTION FRACTION: 62 %
ERYTHROCYTE [DISTWIDTH] IN BLOOD BY AUTOMATED COUNT: 14.6 % (ref 11.5–14.5)
GLUCOSE BLD MANUAL STRIP-MCNC: 155 MG/DL (ref 74–99)
GLUCOSE BLD MANUAL STRIP-MCNC: 229 MG/DL (ref 74–99)
GLUCOSE BLD MANUAL STRIP-MCNC: 234 MG/DL (ref 74–99)
GLUCOSE SERPL-MCNC: 169 MG/DL (ref 74–99)
HCT VFR BLD AUTO: 34.7 % (ref 41–52)
HGB BLD-MCNC: 11.4 G/DL (ref 13.5–17.5)
LACTATE SERPL-SCNC: 0.5 MMOL/L (ref 0.4–2)
LEFT ATRIUM VOLUME AREA LENGTH INDEX BSA: 28.8 ML/M2
LEFT VENTRICLE INTERNAL DIMENSION DIASTOLE: 5 CM (ref 3.5–6)
LEFT VENTRICULAR OUTFLOW TRACT DIAMETER: 1.9 CM
LV EJECTION FRACTION BIPLANE: 62 %
MCH RBC QN AUTO: 29.5 PG (ref 26–34)
MCHC RBC AUTO-ENTMCNC: 32.9 G/DL (ref 32–36)
MCV RBC AUTO: 90 FL (ref 80–100)
MITRAL VALVE E/A RATIO: 1.17
NRBC BLD-RTO: 0 /100 WBCS (ref 0–0)
PLATELET # BLD AUTO: 127 X10*3/UL (ref 150–450)
POTASSIUM SERPL-SCNC: 4 MMOL/L (ref 3.5–5.3)
RBC # BLD AUTO: 3.86 X10*6/UL (ref 4.5–5.9)
RIGHT VENTRICLE FREE WALL PEAK S': 12.7 CM/S
SODIUM SERPL-SCNC: 138 MMOL/L (ref 136–145)
TRICUSPID ANNULAR PLANE SYSTOLIC EXCURSION: 2.6 CM
WBC # BLD AUTO: 6.1 X10*3/UL (ref 4.4–11.3)

## 2024-10-05 PROCEDURE — 99222 1ST HOSP IP/OBS MODERATE 55: CPT | Performed by: INTERNAL MEDICINE

## 2024-10-05 PROCEDURE — 2500000002 HC RX 250 W HCPCS SELF ADMINISTERED DRUGS (ALT 637 FOR MEDICARE OP, ALT 636 FOR OP/ED): Performed by: INTERNAL MEDICINE

## 2024-10-05 PROCEDURE — 71045 X-RAY EXAM CHEST 1 VIEW: CPT

## 2024-10-05 PROCEDURE — 36415 COLL VENOUS BLD VENIPUNCTURE: CPT | Performed by: STUDENT IN AN ORGANIZED HEALTH CARE EDUCATION/TRAINING PROGRAM

## 2024-10-05 PROCEDURE — 2500000002 HC RX 250 W HCPCS SELF ADMINISTERED DRUGS (ALT 637 FOR MEDICARE OP, ALT 636 FOR OP/ED): Performed by: STUDENT IN AN ORGANIZED HEALTH CARE EDUCATION/TRAINING PROGRAM

## 2024-10-05 PROCEDURE — 99223 1ST HOSP IP/OBS HIGH 75: CPT | Performed by: STUDENT IN AN ORGANIZED HEALTH CARE EDUCATION/TRAINING PROGRAM

## 2024-10-05 PROCEDURE — 1200000002 HC GENERAL ROOM WITH TELEMETRY DAILY

## 2024-10-05 PROCEDURE — 2500000001 HC RX 250 WO HCPCS SELF ADMINISTERED DRUGS (ALT 637 FOR MEDICARE OP): Performed by: STUDENT IN AN ORGANIZED HEALTH CARE EDUCATION/TRAINING PROGRAM

## 2024-10-05 PROCEDURE — 85027 COMPLETE CBC AUTOMATED: CPT | Performed by: STUDENT IN AN ORGANIZED HEALTH CARE EDUCATION/TRAINING PROGRAM

## 2024-10-05 PROCEDURE — 93306 TTE W/DOPPLER COMPLETE: CPT | Performed by: INTERNAL MEDICINE

## 2024-10-05 PROCEDURE — 82947 ASSAY GLUCOSE BLOOD QUANT: CPT

## 2024-10-05 PROCEDURE — 2500000001 HC RX 250 WO HCPCS SELF ADMINISTERED DRUGS (ALT 637 FOR MEDICARE OP): Performed by: INTERNAL MEDICINE

## 2024-10-05 PROCEDURE — 82374 ASSAY BLOOD CARBON DIOXIDE: CPT | Performed by: STUDENT IN AN ORGANIZED HEALTH CARE EDUCATION/TRAINING PROGRAM

## 2024-10-05 PROCEDURE — 2500000004 HC RX 250 GENERAL PHARMACY W/ HCPCS (ALT 636 FOR OP/ED): Performed by: STUDENT IN AN ORGANIZED HEALTH CARE EDUCATION/TRAINING PROGRAM

## 2024-10-05 PROCEDURE — 93306 TTE W/DOPPLER COMPLETE: CPT

## 2024-10-05 PROCEDURE — 80048 BASIC METABOLIC PNL TOTAL CA: CPT | Performed by: STUDENT IN AN ORGANIZED HEALTH CARE EDUCATION/TRAINING PROGRAM

## 2024-10-05 PROCEDURE — 99233 SBSQ HOSP IP/OBS HIGH 50: CPT | Performed by: INTERNAL MEDICINE

## 2024-10-05 PROCEDURE — 83605 ASSAY OF LACTIC ACID: CPT | Performed by: STUDENT IN AN ORGANIZED HEALTH CARE EDUCATION/TRAINING PROGRAM

## 2024-10-05 PROCEDURE — 96360 HYDRATION IV INFUSION INIT: CPT

## 2024-10-05 PROCEDURE — 71045 X-RAY EXAM CHEST 1 VIEW: CPT | Performed by: RADIOLOGY

## 2024-10-05 RX ORDER — BISACODYL 5 MG
10 TABLET, DELAYED RELEASE (ENTERIC COATED) ORAL DAILY PRN
Status: DISCONTINUED | OUTPATIENT
Start: 2024-10-05 | End: 2024-10-06 | Stop reason: HOSPADM

## 2024-10-05 RX ORDER — BISACODYL 10 MG/1
10 SUPPOSITORY RECTAL DAILY PRN
Status: DISCONTINUED | OUTPATIENT
Start: 2024-10-05 | End: 2024-10-06 | Stop reason: HOSPADM

## 2024-10-05 RX ORDER — GUAIFENESIN 600 MG/1
600 TABLET, EXTENDED RELEASE ORAL EVERY 12 HOURS PRN
Status: DISCONTINUED | OUTPATIENT
Start: 2024-10-05 | End: 2024-10-06 | Stop reason: HOSPADM

## 2024-10-05 RX ORDER — ONDANSETRON 4 MG/1
4 TABLET, FILM COATED ORAL EVERY 8 HOURS PRN
Status: DISCONTINUED | OUTPATIENT
Start: 2024-10-05 | End: 2024-10-06 | Stop reason: HOSPADM

## 2024-10-05 RX ORDER — MIDODRINE HYDROCHLORIDE 2.5 MG/1
2.5 TABLET ORAL
Status: DISCONTINUED | OUTPATIENT
Start: 2024-10-05 | End: 2024-10-06 | Stop reason: HOSPADM

## 2024-10-05 RX ORDER — PREGABALIN 75 MG/1
150 CAPSULE ORAL 2 TIMES DAILY
Status: DISCONTINUED | OUTPATIENT
Start: 2024-10-05 | End: 2024-10-06 | Stop reason: HOSPADM

## 2024-10-05 RX ORDER — METOPROLOL TARTRATE 25 MG/1
12.5 TABLET, FILM COATED ORAL 2 TIMES DAILY
Status: DISCONTINUED | OUTPATIENT
Start: 2024-10-05 | End: 2024-10-06 | Stop reason: HOSPADM

## 2024-10-05 RX ORDER — PANTOPRAZOLE SODIUM 40 MG/10ML
40 INJECTION, POWDER, LYOPHILIZED, FOR SOLUTION INTRAVENOUS DAILY
Status: DISCONTINUED | OUTPATIENT
Start: 2024-10-05 | End: 2024-10-06 | Stop reason: HOSPADM

## 2024-10-05 RX ORDER — LISINOPRIL 20 MG/1
20 TABLET ORAL DAILY
Status: DISCONTINUED | OUTPATIENT
Start: 2024-10-05 | End: 2024-10-05

## 2024-10-05 RX ORDER — ASPIRIN 81 MG/1
81 TABLET ORAL DAILY
Status: DISCONTINUED | OUTPATIENT
Start: 2024-10-05 | End: 2024-10-06 | Stop reason: HOSPADM

## 2024-10-05 RX ORDER — SODIUM CHLORIDE 9 MG/ML
100 INJECTION, SOLUTION INTRAVENOUS CONTINUOUS
Status: DISCONTINUED | OUTPATIENT
Start: 2024-10-05 | End: 2024-10-05

## 2024-10-05 RX ORDER — LISINOPRIL 5 MG/1
5 TABLET ORAL DAILY
Status: DISCONTINUED | OUTPATIENT
Start: 2024-10-06 | End: 2024-10-06

## 2024-10-05 RX ORDER — ATORVASTATIN CALCIUM 40 MG/1
40 TABLET, FILM COATED ORAL NIGHTLY
Status: DISCONTINUED | OUTPATIENT
Start: 2024-10-05 | End: 2024-10-06 | Stop reason: HOSPADM

## 2024-10-05 RX ORDER — TALC
3 POWDER (GRAM) TOPICAL NIGHTLY PRN
Status: DISCONTINUED | OUTPATIENT
Start: 2024-10-05 | End: 2024-10-06 | Stop reason: HOSPADM

## 2024-10-05 RX ORDER — DEXTROSE 50 % IN WATER (D50W) INTRAVENOUS SYRINGE
25
Status: DISCONTINUED | OUTPATIENT
Start: 2024-10-05 | End: 2024-10-06 | Stop reason: HOSPADM

## 2024-10-05 RX ORDER — BUPROPION HYDROCHLORIDE 150 MG/1
150 TABLET ORAL EVERY MORNING
Status: DISCONTINUED | OUTPATIENT
Start: 2024-10-05 | End: 2024-10-06 | Stop reason: HOSPADM

## 2024-10-05 RX ORDER — INSULIN LISPRO 100 [IU]/ML
0-5 INJECTION, SOLUTION INTRAVENOUS; SUBCUTANEOUS
Status: DISCONTINUED | OUTPATIENT
Start: 2024-10-05 | End: 2024-10-06 | Stop reason: HOSPADM

## 2024-10-05 RX ORDER — FLUTICASONE PROPIONATE 50 MCG
2 SPRAY, SUSPENSION (ML) NASAL NIGHTLY
Status: DISCONTINUED | OUTPATIENT
Start: 2024-10-05 | End: 2024-10-06 | Stop reason: HOSPADM

## 2024-10-05 RX ORDER — DEXTROSE 50 % IN WATER (D50W) INTRAVENOUS SYRINGE
12.5
Status: DISCONTINUED | OUTPATIENT
Start: 2024-10-05 | End: 2024-10-06 | Stop reason: HOSPADM

## 2024-10-05 RX ORDER — EZETIMIBE 10 MG/1
10 TABLET ORAL NIGHTLY
Status: DISCONTINUED | OUTPATIENT
Start: 2024-10-05 | End: 2024-10-06 | Stop reason: HOSPADM

## 2024-10-05 RX ORDER — ALLOPURINOL 300 MG/1
300 TABLET ORAL DAILY
Status: DISCONTINUED | OUTPATIENT
Start: 2024-10-05 | End: 2024-10-06 | Stop reason: HOSPADM

## 2024-10-05 RX ORDER — TAMSULOSIN HYDROCHLORIDE 0.4 MG/1
0.4 CAPSULE ORAL NIGHTLY
Status: DISCONTINUED | OUTPATIENT
Start: 2024-10-05 | End: 2024-10-06 | Stop reason: HOSPADM

## 2024-10-05 RX ORDER — INSULIN GLARGINE 100 [IU]/ML
29 INJECTION, SOLUTION SUBCUTANEOUS EVERY MORNING
Status: DISCONTINUED | OUTPATIENT
Start: 2024-10-05 | End: 2024-10-06 | Stop reason: HOSPADM

## 2024-10-05 RX ORDER — DULOXETIN HYDROCHLORIDE 30 MG/1
60 CAPSULE, DELAYED RELEASE ORAL DAILY
Status: DISCONTINUED | OUTPATIENT
Start: 2024-10-05 | End: 2024-10-06 | Stop reason: HOSPADM

## 2024-10-05 RX ORDER — METOPROLOL TARTRATE 25 MG/1
25 TABLET, FILM COATED ORAL 2 TIMES DAILY
Status: DISCONTINUED | OUTPATIENT
Start: 2024-10-05 | End: 2024-10-05

## 2024-10-05 RX ORDER — ENOXAPARIN SODIUM 100 MG/ML
40 INJECTION SUBCUTANEOUS EVERY 24 HOURS
Status: DISCONTINUED | OUTPATIENT
Start: 2024-10-05 | End: 2024-10-06 | Stop reason: HOSPADM

## 2024-10-05 RX ORDER — ONDANSETRON HYDROCHLORIDE 2 MG/ML
4 INJECTION, SOLUTION INTRAVENOUS EVERY 8 HOURS PRN
Status: DISCONTINUED | OUTPATIENT
Start: 2024-10-05 | End: 2024-10-06 | Stop reason: HOSPADM

## 2024-10-05 RX ORDER — PANTOPRAZOLE SODIUM 40 MG/1
40 TABLET, DELAYED RELEASE ORAL DAILY
Status: DISCONTINUED | OUTPATIENT
Start: 2024-10-05 | End: 2024-10-06 | Stop reason: HOSPADM

## 2024-10-05 RX ORDER — HYDRALAZINE HYDROCHLORIDE 20 MG/ML
10 INJECTION INTRAMUSCULAR; INTRAVENOUS EVERY 6 HOURS PRN
Status: DISCONTINUED | OUTPATIENT
Start: 2024-10-05 | End: 2024-10-06 | Stop reason: HOSPADM

## 2024-10-05 RX ADMIN — INSULIN LISPRO 2 UNITS: 100 INJECTION, SOLUTION INTRAVENOUS; SUBCUTANEOUS at 16:13

## 2024-10-05 RX ADMIN — DULOXETINE HYDROCHLORIDE 60 MG: 30 CAPSULE, DELAYED RELEASE ORAL at 09:35

## 2024-10-05 RX ADMIN — INSULIN LISPRO 2 UNITS: 100 INJECTION, SOLUTION INTRAVENOUS; SUBCUTANEOUS at 11:56

## 2024-10-05 RX ADMIN — METOPROLOL TARTRATE 12.5 MG: 25 TABLET, FILM COATED ORAL at 21:41

## 2024-10-05 RX ADMIN — SODIUM CHLORIDE 100 ML/HR: 9 INJECTION, SOLUTION INTRAVENOUS at 04:30

## 2024-10-05 RX ADMIN — INSULIN GLARGINE 29 UNITS: 100 INJECTION, SOLUTION SUBCUTANEOUS at 09:32

## 2024-10-05 RX ADMIN — ENOXAPARIN SODIUM 40 MG: 40 INJECTION SUBCUTANEOUS at 09:33

## 2024-10-05 RX ADMIN — ATORVASTATIN CALCIUM 40 MG: 40 TABLET, FILM COATED ORAL at 21:41

## 2024-10-05 RX ADMIN — ALLOPURINOL 300 MG: 300 TABLET ORAL at 09:38

## 2024-10-05 RX ADMIN — MIDODRINE HYDROCHLORIDE 2.5 MG: 2.5 TABLET ORAL at 16:11

## 2024-10-05 RX ADMIN — PANTOPRAZOLE SODIUM 40 MG: 40 TABLET, DELAYED RELEASE ORAL at 09:37

## 2024-10-05 RX ADMIN — TAMSULOSIN HYDROCHLORIDE 0.4 MG: 0.4 CAPSULE ORAL at 21:41

## 2024-10-05 RX ADMIN — INSULIN LISPRO 1 UNITS: 100 INJECTION, SOLUTION INTRAVENOUS; SUBCUTANEOUS at 09:30

## 2024-10-05 RX ADMIN — PREGABALIN 150 MG: 75 CAPSULE ORAL at 09:36

## 2024-10-05 RX ADMIN — TICAGRELOR 90 MG: 90 TABLET ORAL at 09:35

## 2024-10-05 RX ADMIN — FLUTICASONE PROPIONATE 2 SPRAY: 50 SPRAY, METERED NASAL at 21:41

## 2024-10-05 RX ADMIN — PREGABALIN 150 MG: 75 CAPSULE ORAL at 21:40

## 2024-10-05 RX ADMIN — MIDODRINE HYDROCHLORIDE 2.5 MG: 2.5 TABLET ORAL at 09:44

## 2024-10-05 RX ADMIN — METOPROLOL TARTRATE 25 MG: 25 TABLET, FILM COATED ORAL at 09:36

## 2024-10-05 RX ADMIN — EMPAGLIFLOZIN 25 MG: 25 TABLET, FILM COATED ORAL at 16:11

## 2024-10-05 RX ADMIN — MIDODRINE HYDROCHLORIDE 2.5 MG: 2.5 TABLET ORAL at 11:55

## 2024-10-05 RX ADMIN — LISINOPRIL 20 MG: 20 TABLET ORAL at 09:37

## 2024-10-05 RX ADMIN — TICAGRELOR 90 MG: 90 TABLET ORAL at 21:41

## 2024-10-05 RX ADMIN — BUPROPION HYDROCHLORIDE 150 MG: 150 TABLET, EXTENDED RELEASE ORAL at 09:37

## 2024-10-05 RX ADMIN — EZETIMIBE 10 MG: 10 TABLET ORAL at 21:40

## 2024-10-05 RX ADMIN — ASPIRIN 81 MG: 81 TABLET, COATED ORAL at 09:38

## 2024-10-05 SDOH — SOCIAL STABILITY: SOCIAL INSECURITY: WERE YOU ABLE TO COMPLETE ALL THE BEHAVIORAL HEALTH SCREENINGS?: YES

## 2024-10-05 SDOH — ECONOMIC STABILITY: FOOD INSECURITY: WITHIN THE PAST 12 MONTHS, THE FOOD YOU BOUGHT JUST DIDN'T LAST AND YOU DIDN'T HAVE MONEY TO GET MORE.: NEVER TRUE

## 2024-10-05 SDOH — SOCIAL STABILITY: SOCIAL INSECURITY
WITHIN THE LAST YEAR, HAVE YOU BEEN RAPED OR FORCED TO HAVE ANY KIND OF SEXUAL ACTIVITY BY YOUR PARTNER OR EX-PARTNER?: NO

## 2024-10-05 SDOH — ECONOMIC STABILITY: INCOME INSECURITY: IN THE PAST 12 MONTHS HAS THE ELECTRIC, GAS, OIL, OR WATER COMPANY THREATENED TO SHUT OFF SERVICES IN YOUR HOME?: NO

## 2024-10-05 SDOH — SOCIAL STABILITY: SOCIAL INSECURITY: WITHIN THE LAST YEAR, HAVE YOU BEEN AFRAID OF YOUR PARTNER OR EX-PARTNER?: NO

## 2024-10-05 SDOH — SOCIAL STABILITY: SOCIAL INSECURITY: WITHIN THE LAST YEAR, HAVE YOU BEEN HUMILIATED OR EMOTIONALLY ABUSED IN OTHER WAYS BY YOUR PARTNER OR EX-PARTNER?: NO

## 2024-10-05 SDOH — SOCIAL STABILITY: SOCIAL INSECURITY
WITHIN THE LAST YEAR, HAVE YOU BEEN KICKED, HIT, SLAPPED, OR OTHERWISE PHYSICALLY HURT BY YOUR PARTNER OR EX-PARTNER?: NO

## 2024-10-05 SDOH — ECONOMIC STABILITY: FOOD INSECURITY: WITHIN THE PAST 12 MONTHS, YOU WORRIED THAT YOUR FOOD WOULD RUN OUT BEFORE YOU GOT MONEY TO BUY MORE.: NEVER TRUE

## 2024-10-05 SDOH — SOCIAL STABILITY: SOCIAL INSECURITY
WITHIN THE LAST YEAR, HAVE TO BEEN RAPED OR FORCED TO HAVE ANY KIND OF SEXUAL ACTIVITY BY YOUR PARTNER OR EX-PARTNER?: NO

## 2024-10-05 SDOH — ECONOMIC STABILITY: INCOME INSECURITY: IN THE PAST 12 MONTHS, HAS THE ELECTRIC, GAS, OIL, OR WATER COMPANY THREATENED TO SHUT OFF SERVICE IN YOUR HOME?: NO

## 2024-10-05 SDOH — ECONOMIC STABILITY: FOOD INSECURITY: WITHIN THE PAST 12 MONTHS, YOU WORRIED THAT YOUR FOOD WOULD RUN OUT BEFORE YOU GOT THE MONEY TO BUY MORE.: NEVER TRUE

## 2024-10-05 SDOH — SOCIAL STABILITY: SOCIAL INSECURITY: ARE YOU OR HAVE YOU BEEN THREATENED OR ABUSED PHYSICALLY, EMOTIONALLY, OR SEXUALLY BY ANYONE?: NO

## 2024-10-05 SDOH — SOCIAL STABILITY: SOCIAL INSECURITY: ABUSE: ADULT

## 2024-10-05 SDOH — SOCIAL STABILITY: SOCIAL INSECURITY: HAS ANYONE EVER THREATENED TO HURT YOUR FAMILY OR YOUR PETS?: NO

## 2024-10-05 SDOH — SOCIAL STABILITY: SOCIAL INSECURITY: DO YOU FEEL ANYONE HAS EXPLOITED OR TAKEN ADVANTAGE OF YOU FINANCIALLY OR OF YOUR PERSONAL PROPERTY?: NO

## 2024-10-05 SDOH — SOCIAL STABILITY: SOCIAL INSECURITY: DO YOU FEEL UNSAFE GOING BACK TO THE PLACE WHERE YOU ARE LIVING?: NO

## 2024-10-05 SDOH — SOCIAL STABILITY: SOCIAL INSECURITY: ARE THERE ANY APPARENT SIGNS OF INJURIES/BEHAVIORS THAT COULD BE RELATED TO ABUSE/NEGLECT?: NO

## 2024-10-05 SDOH — SOCIAL STABILITY: SOCIAL INSECURITY: HAVE YOU HAD ANY THOUGHTS OF HARMING ANYONE ELSE?: NO

## 2024-10-05 SDOH — SOCIAL STABILITY: SOCIAL INSECURITY: DOES ANYONE TRY TO KEEP YOU FROM HAVING/CONTACTING OTHER FRIENDS OR DOING THINGS OUTSIDE YOUR HOME?: NO

## 2024-10-05 SDOH — SOCIAL STABILITY: SOCIAL INSECURITY: HAVE YOU HAD THOUGHTS OF HARMING ANYONE ELSE?: YES

## 2024-10-05 ASSESSMENT — COGNITIVE AND FUNCTIONAL STATUS - GENERAL
DAILY ACTIVITIY SCORE: 24
WALKING IN HOSPITAL ROOM: A LITTLE
TURNING FROM BACK TO SIDE WHILE IN FLAT BAD: TOTAL
MOBILITY SCORE: 14
MOVING FROM LYING ON BACK TO SITTING ON SIDE OF FLAT BED WITH BEDRAILS: TOTAL
MOVING TO AND FROM BED TO CHAIR: A LITTLE
CLIMB 3 TO 5 STEPS WITH RAILING: A LITTLE
MOBILITY SCORE: 14
CLIMB 3 TO 5 STEPS WITH RAILING: A LITTLE
MOVING FROM LYING ON BACK TO SITTING ON SIDE OF FLAT BED WITH BEDRAILS: TOTAL
CLIMB 3 TO 5 STEPS WITH RAILING: A LITTLE
STANDING UP FROM CHAIR USING ARMS: A LITTLE
MOVING FROM LYING ON BACK TO SITTING ON SIDE OF FLAT BED WITH BEDRAILS: TOTAL
MOVING TO AND FROM BED TO CHAIR: A LITTLE
PATIENT BASELINE BEDBOUND: NO
TURNING FROM BACK TO SIDE WHILE IN FLAT BAD: TOTAL
MOBILITY SCORE: 14
STANDING UP FROM CHAIR USING ARMS: A LITTLE
WALKING IN HOSPITAL ROOM: A LITTLE
STANDING UP FROM CHAIR USING ARMS: A LITTLE
TURNING FROM BACK TO SIDE WHILE IN FLAT BAD: TOTAL
DAILY ACTIVITIY SCORE: 24
MOVING TO AND FROM BED TO CHAIR: A LITTLE
WALKING IN HOSPITAL ROOM: A LITTLE
DAILY ACTIVITIY SCORE: 24

## 2024-10-05 ASSESSMENT — ACTIVITIES OF DAILY LIVING (ADL)
LACK_OF_TRANSPORTATION: NO
DRESSING YOURSELF: INDEPENDENT
TOILETING: INDEPENDENT
PATIENT'S MEMORY ADEQUATE TO SAFELY COMPLETE DAILY ACTIVITIES?: YES
PATIENT'S MEMORY ADEQUATE TO SAFELY COMPLETE DAILY ACTIVITIES?: YES
HEARING - RIGHT EAR: FUNCTIONAL
ADEQUATE_TO_COMPLETE_ADL: YES
FEEDING YOURSELF: INDEPENDENT
HEARING - RIGHT EAR: FUNCTIONAL
ADEQUATE_TO_COMPLETE_ADL: YES
FEEDING YOURSELF: INDEPENDENT
HEARING - LEFT EAR: FUNCTIONAL
TOILETING: INDEPENDENT
BATHING: INDEPENDENT
JUDGMENT_ADEQUATE_SAFELY_COMPLETE_DAILY_ACTIVITIES: YES
BATHING: INDEPENDENT
WALKS IN HOME: INDEPENDENT
GROOMING: INDEPENDENT
LACK_OF_TRANSPORTATION: NO
HEARING - LEFT EAR: FUNCTIONAL
GROOMING: INDEPENDENT

## 2024-10-05 ASSESSMENT — ENCOUNTER SYMPTOMS
ARTHRALGIAS: 0
ACTIVITY CHANGE: 0
COLOR CHANGE: 0
SHORTNESS OF BREATH: 0
DIFFICULTY URINATING: 0
NUMBNESS: 0
HEMATURIA: 0
BACK PAIN: 0
DYSURIA: 0
DIZZINESS: 1
FEVER: 0
NAUSEA: 0
JOINT SWELLING: 0
CHILLS: 0
DECREASED CONCENTRATION: 0
COUGH: 0
RHINORRHEA: 0
FLANK PAIN: 0
LIGHT-HEADEDNESS: 1
SEIZURES: 0
TREMORS: 0
DIAPHORESIS: 0
DIARRHEA: 0
SINUS PAIN: 0
ABDOMINAL DISTENTION: 0
WHEEZING: 0
APNEA: 0
FATIGUE: 0
NERVOUS/ANXIOUS: 0
APPETITE CHANGE: 0
WOUND: 0
CONFUSION: 0
FREQUENCY: 0
VOMITING: 0
ABDOMINAL PAIN: 0
PALPITATIONS: 0
SORE THROAT: 0
WEAKNESS: 1
MYALGIAS: 0
HEADACHES: 0
AGITATION: 0
CONSTIPATION: 0
CHEST TIGHTNESS: 0
STRIDOR: 0

## 2024-10-05 ASSESSMENT — PAIN - FUNCTIONAL ASSESSMENT: PAIN_FUNCTIONAL_ASSESSMENT: 0-10

## 2024-10-05 ASSESSMENT — PAIN SCALES - GENERAL
PAINLEVEL_OUTOF10: 0 - NO PAIN
PAINLEVEL_OUTOF10: 0 - NO PAIN

## 2024-10-05 ASSESSMENT — LIFESTYLE VARIABLES
HOW OFTEN DO YOU HAVE 6 OR MORE DRINKS ON ONE OCCASION: NEVER
HOW OFTEN DO YOU HAVE A DRINK CONTAINING ALCOHOL: MONTHLY OR LESS
AUDIT-C TOTAL SCORE: 1
SKIP TO QUESTIONS 9-10: 1
AUDIT-C TOTAL SCORE: 1
HOW MANY STANDARD DRINKS CONTAINING ALCOHOL DO YOU HAVE ON A TYPICAL DAY: PATIENT DOES NOT DRINK

## 2024-10-05 ASSESSMENT — PATIENT HEALTH QUESTIONNAIRE - PHQ9
2. FEELING DOWN, DEPRESSED OR HOPELESS: NOT AT ALL
1. LITTLE INTEREST OR PLEASURE IN DOING THINGS: NOT AT ALL
SUM OF ALL RESPONSES TO PHQ9 QUESTIONS 1 & 2: 0

## 2024-10-05 NOTE — PROGRESS NOTES
Manish Lance is a 64 y.o. male on day 0 of admission presenting with Syncope and collapse.      Subjective   Manish Lance is a 64 y.o. male with PMHx s/f CAD, HTN, aortic stenosis, DM2, asthma, gastric bypass presenting with syncope. Pt has a longstanding history of syncope. He says roughly once a month he gets very lightheaded and usually has to sit or lay down for it to resolve. Today he came in became he had one of these episodes while going up the stairs and lost consciousness completely and hit his head. This time he really had no preceding symptoms and did not feel it coming on. Afterwards he came to relatively quickly and felt fine afterwards. No seizures or convulsions noted during any of these episodes. No chest pain, nausea, vomiting, shortness of breath, or other cardiac symptoms during these episodes. He has had 9 heart caths in the past per the pt. At least one of these were done because he was having recurrent syncopal episodes back in 2021. He last saw Dr. Castillo in December of 2023. His records show he has had PCI of the Cx in 2020 and RCA and then repeat laser PCI of Cx done in 2021. He is on DAPT (ASA and Brilinta) and has not missed any of his doses as far as ne knows. He is taking all of his medications as prescribed. He was also admitted to [facility] month in September for chronic recurrent epigastric pain and CRISSY that was attributed to gastroenteritis and improved with supportive care. He was admitted last year in October of 2023 for similar recurrent syncope episodes which was attributed to an adverse effect of Flomax, which was changed to Finasteride at the time.     ED Course (Summary):   Vitals on presentation: 97.3 F, 68 bpm, 16 rr, 78/49 -> 125/73, 97% on RA  Labs: CMP glu 240, BUN 25, Cr 1.43, AST 58, ALT 87, lactate 3.1 -> 3.6,   BNP 10  Lipase 47  Trop 3 -> 3  CBC Hg 12.9, Plt 148  Imaging: CT head/cervical - No acute intracranial abnormality.   No evidence of cervical  spine fracture or traumatic malalignment.   Intact C6-C7 ACDF. Degenerative change with mild to moderate central   canal stenosis at C4-C5 and moderate bilateral foraminal stenosis at   C5-C6.   EKG - sinus rhythm at 65 bpm  Interventions: LR 1 L bolus,  ml bolus, Admission for further workup.      10/05: Patient was seen and examined, feeling a bit better today no more dizziness, no chest pain or shortness of breath, no nausea vomiting, renal function gradually improving with IV fluid.  Evaluated by cardiology and recommended starting on midodrine for suspected orthostatic hypotension.  e          Objective     Last Recorded Vitals  /81 (BP Location: Right arm, Patient Position: Lying)   Pulse 70   Temp 36.6 °C (97.8 °F) (Temporal)   Resp 17   Wt 72.6 kg (160 lb)   SpO2 96%   Intake/Output last 3 Shifts:    Intake/Output Summary (Last 24 hours) at 10/5/2024 1334  Last data filed at 10/5/2024 1053  Gross per 24 hour   Intake 1078.33 ml   Output --   Net 1078.33 ml       Admission Weight  Weight: 72.6 kg (160 lb) (10/04/24 2131)    Daily Weight  10/04/24 : 72.6 kg (160 lb)    Image Results  CT head wo IV contrast, CT cervical spine wo IV contrast  Narrative: Interpreted By:  Bryant Mcneill,   STUDY:  CT HEAD WO IV CONTRAST; CT CERVICAL SPINE WO IV CONTRAST;  10/4/2024  9:59 pm      INDICATION:  Signs/Symptoms:fall on thinner; Signs/Symptoms:fall. Altered mental  status      COMPARISON:  None.      ACCESSION NUMBER(S):  [id number]; [id number]      ORDERING CLINICIAN:  ZORAIDA SHETH      TECHNIQUE:  Axial noncontrast CT images of the head and cervical spine.      FINDINGS:  BRAIN PARENCHYMA: Gray-white matter interfaces are preserved. No  mass, mass effect or midline shift. No significant brain parenchymal  abnormality.      HEMORRHAGE: No acute intracranial hemorrhage.  VENTRICLES and EXTRA-AXIAL SPACES: Normal size for age.  EXTRACRANIAL SOFT TISSUES:  CALVARIUM: No depressed calvarial  fracture.      .  SOFT TISSUES: Within normal limits.  PARANASAL SINUSES: No hemorrhage in the paranasal sinuses.  MASTOIDS: Within normal limits.  ORBITS: Grossly normal.      ALIGNMENT: Normal cervical lordosis.  VERTEBRAE: No acute fracture. Vertebral body heights are maintained.  There are no suspicious osseous lesions. Postsurgical change of C6-C7  ACDF. Hardware is intact without loosening. There is degenerative  disc space narrowing and posterolateral osseous spurring most  pronounced at C5-C6. This contributes to mild-to-moderate bilateral  foraminal stenosis at C5-C6. SPINAL CANAL:Central disc protrusion and  ligamentum flavum infolding at C4-C5 contributes to mild-to-moderate  central canal stenosis.  No critical spinal canal stenosis.  PREVERTEBRAL SOFT TISSUES: No prevertebral soft tissue swelling. LUNG  APICES: Imaged portion of the lung apices are within normal limits.      OTHER FINDINGS: Atherosclerotic calcifications of the bilateral  carotid bifurcations.          Impression: No acute intracranial abnormality.      No evidence of cervical spine fracture or traumatic malalignment.  Intact C6-C7 ACDF. Degenerative change with mild to moderate central  canal stenosis at C4-C5 and moderate bilateral foraminal stenosis at  C5-C6.      Signed by: Bryant Mcneill 10/4/2024 10:15 PM  Dictation workstation:   [id number]      Physical Exam  Patient is awake and orient, not in apparent distress  Eyes: PERRLA, no conjunctival congestion  Chest: Bilateral Air entry, no crackles or wheezing  Heart: s1S2 regular, no murmur  Abdomen: Soft, non tender, BS present  Ext:    Relevant Results               Assessment/Plan      Recurrent syncope, fall, CRISSY:  CT imaging negative for acute injuries.  Patient was hypotensive in ER-blood pressure improved with IV fluid bolus.  Give fluids, check orthostatics, trend lactate-improved.  Given CAD history, consult cardiology-recommendation appreciated   Last echo 10/23 showed  LVEF 50% with moderate aortic stenosis and regurgitation.  DC lisinopril     Hx CAD:  Continue ASA and Plavix.  Continue home statin and Zetia     Hx gout:  Continue home allopurinol     Depression - continue home psych medications     Type II DM  Continue home Lantus and add SSI while inpatient.  Monitor sugars,     Hypertension  Discontinue lisinopril and Norvasc as patient was hypotensive on presentation  Will continue on metoprolol at lower dose  Evaluated by cardiology and started on low-dose midodrine  Monitor blood pressure adjust dose as needed     BPH  Continue home Flomax. Will have to check to see if he is still on this as it was discontinued during a previous admission for syncope.      Lactic acidosis  Secondary to tissue hypoperfusion  Improved with fluid restriction station and restoration of blood pressure     Diet: Carb controlled  DVT Prophylaxis: Lovenox SQ  Code Status: Full code per discussion with pt.               Marline Peres MD

## 2024-10-05 NOTE — CARE PLAN
The patient's goals for the shift include      The clinical goals for the shift include Patient will be free from falls this shift.    Over the shift, the patient remains free from falls.

## 2024-10-05 NOTE — ED PROVIDER NOTES
HPI   Chief Complaint   Patient presents with    Fall     PT had syncopal episode and fell hitting back of head, also c/o left elbow and right knee pain. PT on Brilinta.       This is a 64-year-old male past medical history of hypertension, edema, coronary artery disease status post PCI, aortic stenosis who presents as a HIA.  Patient has been having more episodes of syncope.  He states that his blood pressure will drop.  He happened 3 times today he was having lightheadedness and it happened again and he fell hitting his head.  He states that he passed out before falling.  He is having some nausea but no other issues or concerns otherwise.    Patient states that for about 3 weeks he has been having less episodes of syncope.  He states that today he had 3 episodes and only hit his head on one of them.                          [place] Coma Scale Score: 15         NIH Stroke Scale: 0          Patient History   Past Medical History:   Diagnosis Date    Diabetes mellitus (Multi)     Personal history of other diseases of the circulatory system 10/16/2020    History of heart disease    Personal history of other diseases of the circulatory system 10/16/2020    History of hypertension     Past Surgical History:   Procedure Laterality Date    BACK SURGERY  2017    Back Surgery    CARPAL TUNNEL RELEASE  2017    Neuroplasty Median Nerve At Carpal Tunnel    CORONARY ANGIOPLASTY  2017    PTCA    GASTRIC BYPASS  2017    Gastric Surgery For Morbid Obesity Gastric Bypass     Family History   Problem Relation Name Age of Onset    Coronary artery disease Mother      Coronary artery disease Father      Coronary artery disease Brother       Social History     Tobacco Use    Smoking status: Former     Current packs/day: 0.00     Types: Cigarettes     Quit date:      Years since quittin.7    Smokeless tobacco: Former   Vaping Use    Vaping status: Never Used   Substance Use Topics    Alcohol use: Not  Currently    Drug use: Never       Physical Exam   ED Triage Vitals [10/04/24 2131]   Temperature Heart Rate Respirations BP   36.3 °C (97.3 °F) 68 16 (!) 78/49      Pulse Ox Temp src Heart Rate Source Patient Position   97 % -- -- --      BP Location FiO2 (%)     -- --       Physical Exam  Constitutional:       General: He is not in acute distress.  HENT:      Head: Normocephalic and atraumatic.      Right Ear: Tympanic membrane normal.      Left Ear: Tympanic membrane normal.      Mouth/Throat:      Mouth: Mucous membranes are moist.   Eyes:      Extraocular Movements: Extraocular movements intact.      Conjunctiva/sclera: Conjunctivae normal.      Pupils: Pupils are equal, round, and reactive to light.   Cardiovascular:      Rate and Rhythm: Normal rate and regular rhythm.      Heart sounds: No murmur heard.  Pulmonary:      Effort: Pulmonary effort is normal. No respiratory distress.      Breath sounds: Normal breath sounds. No stridor. No wheezing or rales.   Abdominal:      General: Bowel sounds are normal. There is no distension.      Tenderness: There is no abdominal tenderness. There is no guarding or rebound.   Musculoskeletal:         General: No swelling, tenderness or deformity. Normal range of motion.   Skin:     General: Skin is warm and dry.      Coloration: Skin is not jaundiced.      Findings: No bruising or lesion.   Neurological:      General: No focal deficit present.      Mental Status: He is alert and oriented to person, place, and time. Mental status is at baseline.      Cranial Nerves: No cranial nerve deficit.      Motor: No weakness.   Psychiatric:         Mood and Affect: Mood normal.       Labs Reviewed   CBC WITH AUTO DIFFERENTIAL   MAGNESIUM   COMPREHENSIVE METABOLIC PANEL   LIPASE   LACTATE   TROPONIN SERIES- (INITIAL, 1 HR)    Narrative:     The following orders were created for panel order Troponin I Series, High Sensitivity (0, 1 HR).  Procedure                                Abnormality         Status                     ---------                               -----------         ------                     Troponin I, High Sensiti...[[id number]]                                                 Troponin, High Sensitivi...[[id number]]                                                   Please view results for these tests on the individual orders.   B-TYPE NATRIURETIC PEPTIDE   SERIAL TROPONIN-INITIAL   SERIAL TROPONIN, 1 HOUR     CT head wo IV contrast    (Results Pending)   CT cervical spine wo IV contrast    (Results Pending)       ED Course & MDM   ED Course as of 10/05/24 0051   Fri Oct 04, 2024   2209 EKG on my read shows sinus rhythm at a rate of 65 bpm no ST change elevation nonischemic EKG, normal intervals. [CF]   2217 IMPRESSION:  No acute intracranial abnormality.      No evidence of cervical spine fracture or traumatic malalignment.  Intact C6-C7 ACDF. Degenerative change with mild to moderate central  canal stenosis at C4-C5 and moderate bilateral foraminal stenosis at  C5-C6.   [CF]      ED Course User Index  [CF] Peg Lopez MD       Medical Decision Making  64-year-old male who presents emergency department for multiple episodes of syncope.  He did hit his head as well.  He has no focal logical deficits.  Here he is orthostatic positive and his blood pressure was originally in the 70s systolically.  CT of his head and neck showed no acute pathology.  Patient does have a worsening CRISSY and his LFTs are slightly bumped.  He has no tense palpation of his right upper abdomen looks patient for biliary pathology.  His lactate is also elevated aspect of this is all secondary to his hypotension.  Blood pressure did improve with fluids but he continues to be orthostatic positive.  His EKG is nonischemic and his troponin is negative x 2.  Patient and family state the last time this happened he did need a heart catheterization for a blocked stent they state that he presented exact  same way and his heart markers were not elevated as well.        Procedure  Procedures     Peg Lopez MD  10/05/24 0053

## 2024-10-05 NOTE — CARE PLAN
The patient's goals for the shift include  I don't want to fall    The clinical goals for the shift include Pt will be free from a fall and injury

## 2024-10-05 NOTE — H&P
[facility] - GENERAL MEDICINE HISTORY AND PHYSICAL    History Obtained From: Pt, some chart review    History Of Present Illness:  Manish Lance is a 64 y.o. male with PMHx s/f CAD, HTN, aortic stenosis, DM2, asthma, gastric bypass presenting with syncope. Pt has a longstanding history of syncope. He says roughly once a month he gets very lightheaded and usually has to sit or lay down for it to resolve. Today he came in became he had one of these episodes while going up the stairs and lost consciousness completely and hit his head. This time he really had no preceding symptoms and did not feel it coming on. Afterwards he came to relatively quickly and felt fine afterwards. No seizures or convulsions noted during any of these episodes. No chest pain, nausea, vomiting, shortness of breath, or other cardiac symptoms during these episodes. He has had 9 heart caths in the past per the pt. At least one of these were done because he was having recurrent syncopal episodes back in 2021. He last saw Dr. Castillo in December of 2023. His records show he has had PCI of the Cx in 2020 and RCA and then repeat laser PCI of Cx done in 2021. He is on DAPT (ASA and Brilinta) and has not missed any of his doses as far as ne knows. He is taking all of his medications as prescribed. He was also admitted to [facility] last month in September for chronic recurrent epigastric pain and CRISSY that was attributed to gastroenteritis and improved with supportive care. He was admitted last year in October of 2023 for similar recurrent syncope episodes which was attributed to an adverse effect of Flomax, which was changed to Finasteride at the time.    ED Course (Summary):   Vitals on presentation: 97.3 F, 68 bpm, 16 rr, 78/49 -> 125/73, 97% on RA  Labs: CMP glu 240, BUN 25, Cr 1.43, AST 58, ALT 87, lactate 3.1 -> 3.6,   BNP 10  Lipase 47  Trop 3 -> 3  CBC Hg 12.9, Plt 148  Imaging: CT head/cervical - No acute intracranial  abnormality.   No evidence of cervical spine fracture or traumatic malalignment.   Intact C6-C7 ACDF. Degenerative change with mild to moderate central   canal stenosis at C4-C5 and moderate bilateral foraminal stenosis at   C5-C6.   EKG - sinus rhythm at 65 bpm  Interventions: LR 1 L bolus,  ml bolus, Admission for further workup.    ED Course (From Provider):  ED Course as of 10/05/24 0401   Fri Oct 04, 2024   2209 EKG on my read shows sinus rhythm at a rate of 65 bpm no ST change elevation nonischemic EKG, normal intervals. [CF]   2217 IMPRESSION:  No acute intracranial abnormality.      No evidence of cervical spine fracture or traumatic malalignment.  Intact C6-C7 ACDF. Degenerative change with mild to moderate central  canal stenosis at C4-C5 and moderate bilateral foraminal stenosis at  C5-C6.   [CF]      ED Course User Index  [CF] Peg Lopez MD         Diagnoses as of 10/05/24 0401   Closed head injury, initial encounter   Fall, initial encounter   Syncope, unspecified syncope type   Syncope and collapse     Relevant Results  Results for orders placed or performed during the hospital encounter of 10/04/24 (from the past 24 hour(s))   CBC and Auto Differential   Result Value Ref Range    WBC 6.7 4.4 - 11.3 x10*3/uL    nRBC 0.0 0.0 - 0.0 /100 WBCs    RBC 4.46 (L) 4.50 - 5.90 x10*6/uL    Hemoglobin 12.9 (L) 13.5 - 17.5 g/dL    Hematocrit 39.5 (L) 41.0 - 52.0 %    MCV 89 80 - 100 fL    MCH 28.9 26.0 - 34.0 pg    MCHC 32.7 32.0 - 36.0 g/dL    RDW 14.6 (H) 11.5 - 14.5 %    Platelets 148 (L) 150 - 450 x10*3/uL    Neutrophils % 53.2 40.0 - 80.0 %    Immature Granulocytes %, Automated 0.3 0.0 - 0.9 %    Lymphocytes % 28.6 13.0 - 44.0 %    Monocytes % 7.5 2.0 - 10.0 %    Eosinophils % 9.4 0.0 - 6.0 %    Basophils % 1.0 0.0 - 2.0 %    Neutrophils Absolute 3.56 1.20 - 7.70 x10*3/uL    Immature Granulocytes Absolute, Automated 0.02 0.00 - 0.70 x10*3/uL    Lymphocytes Absolute 1.91 1.20 - 4.80 x10*3/uL     Monocytes Absolute 0.50 0.10 - 1.00 x10*3/uL    Eosinophils Absolute 0.63 0.00 - 0.70 x10*3/uL    Basophils Absolute 0.07 0.00 - 0.10 x10*3/uL   Magnesium   Result Value Ref Range    Magnesium 1.72 1.60 - 2.40 mg/dL   Comprehensive metabolic panel   Result Value Ref Range    Glucose 240 (H) 74 - 99 mg/dL    Sodium 136 136 - 145 mmol/L    Potassium 4.7 3.5 - 5.3 mmol/L    Chloride 106 98 - 107 mmol/L    Bicarbonate 20 (L) 21 - 32 mmol/L    Anion Gap 15 10 - 20 mmol/L    Urea Nitrogen 25 (H) 6 - 23 mg/dL    Creatinine 1.43 (H) 0.50 - 1.30 mg/dL    eGFR 55 (L) >60 mL/min/1.73m*2    Calcium 8.9 8.6 - 10.3 mg/dL    Albumin 4.1 3.4 - 5.0 g/dL    Alkaline Phosphatase 57 33 - 136 U/L    Total Protein 6.4 6.4 - 8.2 g/dL    AST 58 (H) 9 - 39 U/L    Bilirubin, Total 0.4 0.0 - 1.2 mg/dL    ALT 87 (H) 10 - 52 U/L   Lipase   Result Value Ref Range    Lipase 47 9 - 82 U/L   Lactate   Result Value Ref Range    Lactate 3.1 (H) 0.4 - 2.0 mmol/L   B-Type Natriuretic Peptide   Result Value Ref Range    BNP 10 0 - 99 pg/mL   Troponin I, High Sensitivity, Initial   Result Value Ref Range    Troponin I, High Sensitivity 3 0 - 20 ng/L   Troponin, High Sensitivity, 1 Hour   Result Value Ref Range    Troponin I, High Sensitivity <3 0 - 20 ng/L   Lactate   Result Value Ref Range    Lactate 3.6 (H) 0.4 - 2.0 mmol/L      CT head wo IV contrast    Result Date: 10/4/2024  Interpreted By:  Bryant Mcneill, STUDY: CT HEAD WO IV CONTRAST; CT CERVICAL SPINE WO IV CONTRAST;  10/4/2024 9:59 pm   INDICATION: Signs/Symptoms:fall on thinner; Signs/Symptoms:fall. Altered mental status   COMPARISON: None.   ACCESSION NUMBER(S): [id number]; [id number]   ORDERING CLINICIAN: ZORAIDA SHETH   TECHNIQUE: Axial noncontrast CT images of the head and cervical spine.   FINDINGS: BRAIN PARENCHYMA: Gray-white matter interfaces are preserved. No mass, mass effect or midline shift. No significant brain parenchymal abnormality.   HEMORRHAGE: No acute intracranial  hemorrhage. VENTRICLES and EXTRA-AXIAL SPACES: Normal size for age. EXTRACRANIAL SOFT TISSUES: CALVARIUM: No depressed calvarial fracture.   . SOFT TISSUES: Within normal limits. PARANASAL SINUSES: No hemorrhage in the paranasal sinuses. MASTOIDS: Within normal limits. ORBITS: Grossly normal.   ALIGNMENT: Normal cervical lordosis. VERTEBRAE: No acute fracture. Vertebral body heights are maintained. There are no suspicious osseous lesions. Postsurgical change of C6-C7 ACDF. Hardware is intact without loosening. There is degenerative disc space narrowing and posterolateral osseous spurring most pronounced at C5-C6. This contributes to mild-to-moderate bilateral foraminal stenosis at C5-C6. SPINAL CANAL:Central disc protrusion and ligamentum flavum infolding at C4-C5 contributes to mild-to-moderate central canal stenosis.  No critical spinal canal stenosis. PREVERTEBRAL SOFT TISSUES: No prevertebral soft tissue swelling. LUNG APICES: Imaged portion of the lung apices are within normal limits.   OTHER FINDINGS: Atherosclerotic calcifications of the bilateral carotid bifurcations.         No acute intracranial abnormality.   No evidence of cervical spine fracture or traumatic malalignment. Intact C6-C7 ACDF. Degenerative change with mild to moderate central canal stenosis at C4-C5 and moderate bilateral foraminal stenosis at C5-C6.   Signed by: Bryant Mcneill 10/4/2024 10:15 PM Dictation workstation:   [id number]    CT cervical spine wo IV contrast    Result Date: 10/4/2024  Interpreted By:  Bryant Mcneill, STUDY: CT HEAD WO IV CONTRAST; CT CERVICAL SPINE WO IV CONTRAST;  10/4/2024 9:59 pm   INDICATION: Signs/Symptoms:fall on thinner; Signs/Symptoms:fall. Altered mental status   COMPARISON: None.   ACCESSION NUMBER(S): [id number]; [id number]   ORDERING CLINICIAN: ZORAIDA SHETH   TECHNIQUE: Axial noncontrast CT images of the head and cervical spine.   FINDINGS: BRAIN PARENCHYMA: Gray-white matter interfaces are  preserved. No mass, mass effect or midline shift. No significant brain parenchymal abnormality.   HEMORRHAGE: No acute intracranial hemorrhage. VENTRICLES and EXTRA-AXIAL SPACES: Normal size for age. EXTRACRANIAL SOFT TISSUES: CALVARIUM: No depressed calvarial fracture.   . SOFT TISSUES: Within normal limits. PARANASAL SINUSES: No hemorrhage in the paranasal sinuses. MASTOIDS: Within normal limits. ORBITS: Grossly normal.   ALIGNMENT: Normal cervical lordosis. VERTEBRAE: No acute fracture. Vertebral body heights are maintained. There are no suspicious osseous lesions. Postsurgical change of C6-C7 ACDF. Hardware is intact without loosening. There is degenerative disc space narrowing and posterolateral osseous spurring most pronounced at C5-C6. This contributes to mild-to-moderate bilateral foraminal stenosis at C5-C6. SPINAL CANAL:Central disc protrusion and ligamentum flavum infolding at C4-C5 contributes to mild-to-moderate central canal stenosis.  No critical spinal canal stenosis. PREVERTEBRAL SOFT TISSUES: No prevertebral soft tissue swelling. LUNG APICES: Imaged portion of the lung apices are within normal limits.   OTHER FINDINGS: Atherosclerotic calcifications of the bilateral carotid bifurcations.         No acute intracranial abnormality.   No evidence of cervical spine fracture or traumatic malalignment. Intact C6-C7 ACDF. Degenerative change with mild to moderate central canal stenosis at C4-C5 and moderate bilateral foraminal stenosis at C5-C6.   Signed by: Bryant Mcneill 10/4/2024 10:15 PM Dictation workstation:   [id number]    Scheduled medications:  allopurinol, 300 mg, oral, Daily  aspirin, 81 mg, oral, Daily  atorvastatin, 40 mg, oral, Nightly  buPROPion XL, 150 mg, oral, q AM  DULoxetine, 60 mg, oral, Daily  enoxaparin, 40 mg, subcutaneous, q24h  ezetimibe, 10 mg, oral, Nightly  insulin glargine, 29 Units, subcutaneous, q AM  lisinopril, 20 mg, oral, Daily  metoprolol tartrate, 25 mg, oral,  BID  pantoprazole, 40 mg, oral, Daily before breakfast   Or  pantoprazole, 40 mg, intravenous, Daily before breakfast  pregabalin, 150 mg, oral, BID  tamsulosin, 0.4 mg, oral, Nightly  ticagrelor, 90 mg, oral, BID      Continuous medications:     PRN medications:  PRN medications: bisacodyl, bisacodyl, guaiFENesin, melatonin, ondansetron **OR** ondansetron     Past Medical History  He has a past medical history of Diabetes mellitus (Multi), Personal history of other diseases of the circulatory system (10/16/2020), and Personal history of other diseases of the circulatory system (10/16/2020).    Surgical History  He has a past surgical history that includes Coronary angioplasty (07/14/2017); Gastric bypass (07/14/2017); Back surgery (07/14/2017); and Carpal tunnel release (07/14/2017).     Social History  He reports that he quit smoking about 29 years ago. His smoking use included cigarettes. He has quit using smokeless tobacco. He reports that he does not currently use alcohol. He reports that he does not use drugs.    Family History  Family History   Problem Relation Name Age of Onset    Coronary artery disease Mother      Coronary artery disease Father      Coronary artery disease Brother         Allergies  Patient has no known allergies.    Code Status  Full Code     Review of Systems   Constitutional:  Negative for activity change, appetite change, chills, diaphoresis, fatigue and fever.   HENT:  Negative for congestion, ear pain, rhinorrhea, sinus pain and sore throat.    Respiratory:  Negative for apnea, cough, chest tightness, shortness of breath, wheezing and stridor.    Cardiovascular:  Negative for chest pain, palpitations and leg swelling.   Gastrointestinal:  Negative for abdominal distention, abdominal pain, constipation, diarrhea, nausea and vomiting.   Genitourinary:  Negative for difficulty urinating, dysuria, flank pain, frequency, hematuria and urgency.   Musculoskeletal:  Negative for arthralgias,  back pain, gait problem, joint swelling and myalgias.   Skin:  Negative for color change, pallor, rash and wound.   Neurological:  Positive for dizziness, syncope, weakness and light-headedness. Negative for tremors, seizures, numbness and headaches.   Psychiatric/Behavioral:  Negative for agitation, behavioral problems, confusion and decreased concentration. The patient is not nervous/anxious.    All other systems reviewed and are negative.      Last Recorded Vitals  /63   Pulse 67   Temp 36.3 °C (97.3 °F)   Resp 11   Wt 72.6 kg (160 lb)   SpO2 99%      Physical Exam:  Vital signs and nursing notes reviewed.   Constitutional: Pleasant and cooperative. Laying in bed in no acute distress. Conversant.   Skin: Warm and dry; no obvious lesions, rashes, pallor, or jaundice. Good turgor.   Eyes: EOMI. Anicteric sclera.   ENT: Mucous membranes moist; no obvious injury or deformity appreciated.   Head and Neck: Normocephalic, atraumatic. ROM preserved. Trachea midline. No appreciable JVD.   Respiratory: Nonlabored on RA. Lungs clear to auscultation bilaterally without obvious adventitious sounds. Chest rise is equal.  Cardiovascular: RRR. No gross murmur, gallop, or rub. Extremities are warm and well-perfused with good capillary refill (< 3 seconds). No chest wall tenderness.   GI: Abdomen soft, nontender, nondistended. No obvious organomegaly appreciated. Bowel sounds are present and normoactive.  : No CVA tenderness.   MSK: No gross abnormalities appreciated. No limitations to AROM/PROM appreciated.   Extremities: No cyanosis, edema, or clubbing evident. Neurovascularly intact.   Neuro: A&Ox3. CN 2-12 grossly intact. Able to respond to questions appropriately and clearly. No acute focal neurologic deficits appreciated.  Psych: Appropriate mood and behavior.    Assessment/Plan   Assessment & Plan  Syncope and collapse    Type 2 diabetes mellitus with diabetic neuropathy, with long-term current use of  insulin    Posttraumatic stress disorder    Nonrheumatic aortic valve stenosis    Moderate episode of recurrent major depressive disorder    Hypertension    Hyperlipidemia    Gout, chronic    Extrinsic asthma without status asthmaticus ([facility])    Diastolic heart failure    Depression    Benign prostatic hyperplasia without lower urinary tract symptoms    Hypotension    Lactic acidosis    Hyperglycemia    Transaminitis    Thrombocytopenia (CMS-HCC)    Normocytic anemia    CRISSY (acute kidney injury) (CMS-HCC)    Plan:  Admit to gen med with tele    Recurrent syncope, fall, CRISSY:  CT imaging negative for acute injuries.  Give fluids, check orthostatics, trend lactate.  Given CAD history, consult cardiology  Will order updated echo. Last echo 10/23 showed LVEF 50% with moderate aortic stenosis and regurgitation.    Hx CAD:  Continue ASA and Plavix.  Continue home statin and Zetia    Hx gout:  Continue home allopurinol    Depression - continue home psych medications    ID DM2:  Continue home Lantus and add SSI while inpatient.  Monitor sugars, may be contributing to syncopal episodes    HTN: Continue home meds. May have to adjust/hold if he becomes hypotensive again.  He is on lisinopril and Lopressor, which may be contributing to his hypotension if he is orthostatic.    BPH  Continue home Flomax. Will have to check to see if he is still on this as it was discontinued during a previous admission for syncope.    Diet: Carb controlled  DVT Prophylaxis: Lovenox SQ  Code Status: Full code per discussion with pt.     Brant Smith DO    Dragon dictation software was used to dictate this note and thus there may be minor errors in translation/transcription including garbled speech or misspellings. Please contact for clarification if needed.

## 2024-10-05 NOTE — PROGRESS NOTES
Emergency Medicine Transition of Care Note.    I received Manish Lance in signout from Dr. Lopez.  Please see the previous ED provider note for all HPI, PE and MDM up to the time of signout. This is in addition to the primary record.    In brief Manish Lance is an 64 y.o. male presenting for   Chief Complaint   Patient presents with    Fall     PT had syncopal episode and fell hitting back of head, also c/o left elbow and right knee pain. PT on Brilinta.     At the time of signout we were awaiting: Admission    ED Course as of 10/05/24 0328   Fri Oct 04, 2024   2209 EKG on my read shows sinus rhythm at a rate of 65 bpm no ST change elevation nonischemic EKG, normal intervals. [CF]   2217 IMPRESSION:  No acute intracranial abnormality.      No evidence of cervical spine fracture or traumatic malalignment.  Intact C6-C7 ACDF. Degenerative change with mild to moderate central  canal stenosis at C4-C5 and moderate bilateral foraminal stenosis at  C5-C6.   [CF]      ED Course User Index  [CF] Peg Lopez MD         Diagnoses as of 10/05/24 0328   Closed head injury, initial encounter   Fall, initial encounter   Syncope, unspecified syncope type   Syncope and collapse       Medical Decision Making  Discussed with hospitalist, Dr. Smith.  The patient was accepted for admission.    Final diagnoses:   [S09.90XA] Closed head injury, initial encounter   [W19.XXXA] Fall, initial encounter   [R55] Syncope, unspecified syncope type   [R55] Syncope and collapse           Procedure  Procedures    Beverley Powell DO

## 2024-10-05 NOTE — ED TRIAGE NOTES
PT had syncopal episode and fell hitting back of head, also c/o left elbow and right knee pain. PT on Brilinta. PT A&Ox4, GCS 15, Airway patent.

## 2024-10-05 NOTE — CONSULTS
Inpatient consult to Cardiology  Consult performed by: Alexis Castillo MD  Consult ordered by: Brant Smith DO        History Of Present Illness:    Manish Lance is a 64 y.o. male with PMHx s/f CAD, HTN, aortic stenosis, DM2, asthma, gastric bypass presenting with syncope. Pt has a longstanding history of syncope. He says roughly once a month he gets very lightheaded and usually has to sit or lay down for it to resolve. Today he came in became he had one of these episodes while going up the stairs and lost consciousness completely and hit his head. This time he really had no preceding symptoms and did not feel it coming on. Afterwards he came to relatively quickly and felt fine afterwards. No seizures or convulsions noted during any of these episodes. No chest pain, nausea, vomiting, shortness of breath, or other cardiac symptoms during these episodes. He has had 9 heart caths in the past per the pt. At least one of these were done because he was having recurrent syncopal episodes back in 2021. He last saw Dr. Castillo in December of 2023. His records show he has had PCI of the Cx in 2020 and RCA and then repeat laser PCI of Cx done in 2021. He is on DAPT (ASA and Brilinta) and has not missed any of his doses as far as ne knows. He is taking all of his medications as prescribed. He was also admitted to [facility] last month in September for chronic recurrent epigastric pain and CRISSY that was attributed to gastroenteritis and improved with supportive care. He was admitted last year in October of 2023 for similar recurrent syncope episodes which was attributed to an adverse effect of Flomax, which was changed to Finasteride at the time.      Last Recorded Vitals:  Vitals:    10/05/24 0300 10/05/24 0330 10/05/24 0400 10/05/24 0430   BP: 107/63 104/63 125/73 118/84   BP Location:       Patient Position:       Pulse: 66 67 63 64   Resp: 11 11 12 15   Temp:       SpO2: 99% 99% 97% 98%   Weight:       Height:           Last  Labs:  CBC - 10/5/2024:  5:19 AM  6.1 11.4 127    34.7      CMP - 10/5/2024:  5:19 AM  8.7 6.4 58 --- 0.4   4.5 4.1 87 57      PTT - No results in last year.  1.0   11.2 _     Troponin I, High Sensitivity   Date/Time Value Ref Range Status   10/04/2024 11:19 PM <3 0 - 20 ng/L Final   10/04/2024 10:03 PM 3 0 - 20 ng/L Final   09/16/2024 07:16 PM 3 0 - 20 ng/L Final     BNP   Date/Time Value Ref Range Status   10/04/2024 10:03 PM 10 0 - 99 pg/mL Final   10/27/2023 11:35 PM 16 0 - 99 pg/mL Final     Hemoglobin A1C   Date/Time Value Ref Range Status   10/28/2023 05:05 AM 10.3 (H) see below % Final   04/27/2023 05:10 PM 8.4 (A) % Final     Comment:          Diagnosis of Diabetes-Adults   Non-Diabetic: < or = 5.6%   Increased risk for developing diabetes: 5.7-6.4%   Diagnostic of diabetes: > or = 6.5%  .       Monitoring of Diabetes                Age (y)     Therapeutic Goal (%)   Adults:          >18           <7.0   Pediatrics:    13-18           <7.5                   7-12           <8.0                   0- 6            7.5-8.5   American Diabetes Association. Diabetes Care 33(S1), Jan 2010.     02/11/2020 01:15 PM 7.5 % Final     Comment:          Diagnosis of Diabetes-Adults   Non-Diabetic: < or = 5.6%   Increased risk for developing diabetes: 5.7-6.4%   Diagnostic of diabetes: > or = 6.5%  .       Monitoring of Diabetes                Age (y)     Therapeutic Goal (%)   Adults:          >18           <7.0   Pediatrics:    13-18           <7.5                   7-12           <8.0                   0- 6            7.5-8.5   American Diabetes Association. Diabetes Care 33(S1), Jan 2010.       LDL Calculated   Date/Time Value Ref Range Status   10/28/2023 05:05 AM 40 <=99 mg/dL Final     Comment:                                 Near   Borderline      AGE      Desirable  Optimal    High     High     Very High     0-19 Y     0 - 109     ---    110-129   >/= 130     ----    20-24 Y     0 - 119     ---    120-159   >/=  "160     ----      >24 Y     0 -  99   100-129  130-159   160-189     >/=190       VLDL   Date/Time Value Ref Range Status   10/28/2023 05:05 AM 15 0 - 40 mg/dL Final   05/26/2022 09:10 AM 18 0 - 40 mg/dL Final   03/08/2021 07:51 AM 32 0 - 40 mg/dL Final   01/18/2021 08:26 AM 53 (H) 0 - 40 mg/dL Final      Last I/O:  No intake/output data recorded.    Past Cardiology Tests (Last 3 Years):  EKG:  ECG 12 lead 09/16/2024      ECG 12 lead 08/22/2024      ECG 12 lead 08/19/2024      ECG 12 Lead 12/18/2023    Echo:  Transthoracic Echo (TTE) Complete 10/28/2023    Ejection Fractions:  No results found for: \"EF\"  Cath:  No results found for this or any previous visit from the past 1095 days.    Stress Test:  No results found for this or any previous visit from the past 1095 days.    Cardiac Imaging:  No results found for this or any previous visit from the past 1095 days.      Past Medical History:  He has a past medical history of Diabetes mellitus (Multi), Personal history of other diseases of the circulatory system (10/16/2020), and Personal history of other diseases of the circulatory system (10/16/2020).    Past Surgical History:  He has a past surgical history that includes Coronary angioplasty (07/14/2017); Gastric bypass (07/14/2017); Back surgery (07/14/2017); and Carpal tunnel release (07/14/2017).      Social History:  He reports that he quit smoking about 29 years ago. His smoking use included cigarettes. He has quit using smokeless tobacco. He reports that he does not currently use alcohol. He reports that he does not use drugs.    Family History:  Family History   Problem Relation Name Age of Onset    Coronary artery disease Mother      Coronary artery disease Father      Coronary artery disease Brother          Allergies:  Patient has no known allergies.    Inpatient Medications:  Scheduled medications   Medication Dose Route Frequency    allopurinol  300 mg oral Daily    aspirin  81 mg oral Daily    " atorvastatin  40 mg oral Nightly    buPROPion XL  150 mg oral q AM    DULoxetine  60 mg oral Daily    enoxaparin  40 mg subcutaneous q24h    ezetimibe  10 mg oral Nightly    insulin glargine  29 Units subcutaneous q AM    insulin lispro  0-5 Units subcutaneous TID    lisinopril  20 mg oral Daily    metoprolol tartrate  25 mg oral BID    midodrine  2.5 mg oral TID    pantoprazole  40 mg oral Daily    Or    pantoprazole  40 mg intravenous Daily    pregabalin  150 mg oral BID    tamsulosin  0.4 mg oral Nightly    ticagrelor  90 mg oral BID     PRN medications   Medication    bisacodyl    bisacodyl    dextrose    dextrose    glucagon    glucagon    guaiFENesin    melatonin    ondansetron    Or    ondansetron     Continuous Medications   Medication Dose Last Rate    sodium chloride 0.9%  100 mL/hr 100 mL/hr (10/05/24 0430)     Outpatient Medications:  Current Outpatient Medications   Medication Instructions    acetaminophen (TYLENOL) 325 mg, oral, Every 6 hours PRN    albuterol (ProAir HFA) 90 mcg/actuation inhaler INHALE 1-2 PUFFS QID PRN SHORTNESS OR BREATH/WHEEZING     allopurinol (ZYLOPRIM) 300 mg, oral, Daily    amLODIPine (Norvasc) 5 mg tablet 1 tablet, oral, Daily    aspirin-acetaminophen-caffeine (Excedrin Migraine) 250-250-65 mg tablet 1 tablet, oral, As needed    aspirin 81 mg, oral, Daily    atorvastatin (LIPITOR) 40 mg, oral, Nightly    buPROPion XL (WELLBUTRIN XL) 150 mg, oral, Every morning, Do not crush, chew, or split.    cholecalciferol (Vitamin D-3) 50 MCG (2000 UT) tablet 1 tablet, oral, Daily, CLARIFY DIRECTIONS    DULoxetine (CYMBALTA) 60 mg, oral, Daily    empagliflozin (JARDIANCE) 25 mg, oral, Daily    ezetimibe (Zetia) 10 mg tablet 1 tablet, oral, Daily    fluticasone (Flonase) 50 mcg/actuation nasal spray 2 sprays, Each Nostril, Nightly, CLARIFY DIRECTIONS    insulin glargine (LANTUS U-100 INSULIN) 29 Units, subcutaneous, Every morning    lisinopril 20 mg, oral, Daily    metFORMIN (Glucophage)  1,000 mg tablet 1 tablet, oral, Every 12 hours    metoprolol tartrate (Lopressor) 25 mg tablet 1 tablet, oral, 2 times daily    naproxen (NAPROSYN) 375 mg, oral, 2 times daily (morning and late afternoon)    nitroglycerin (NITROSTAT) 0.4 mg, sublingual, Every 5 min PRN, PLACE 1 TABLET UNDER THE TONGUE EVERY 5 MINUTES FOR UP TO 3 DOSES AS NEEDED FOR CHEST PAIN.CALL 911 IF PAIN PERSISTS.    omeprazole (PriLOSEC) 20 mg DR capsule 1 capsule, oral, 2 times daily    ondansetron ODT (ZOFRAN-ODT) 4 mg, oral, Every 8 hours PRN    pregabalin (Lyrica) 150 mg capsule 1 capsule, oral, 2 times daily    tamsulosin (Flomax) 0.4 mg 24 hr capsule 1 capsule, oral, Nightly    ticagrelor (Brilinta) 60 mg tablet oral, 2 times daily       Physical Exam:  Constitutional: Pleasant and cooperative. Laying in bed in no acute distress. Conversant.   Skin: Warm and dry; no obvious lesions, rashes, pallor, or jaundice. Good turgor.   Eyes: EOMI. Anicteric sclera.   ENT: Mucous membranes moist; no obvious injury or deformity appreciated.   Head and Neck: Normocephalic, atraumatic. ROM preserved. Trachea midline. No appreciable JVD.   Respiratory: Nonlabored on RA. Lungs clear to auscultation bilaterally without obvious adventitious sounds. Chest rise is equal.  Cardiovascular: RRR. 3/6 systolic murmur, gallop, or rub. Extremities are warm and well-perfused with good capillary refill (< 3 seconds). No chest wall tenderness.   GI: Abdomen soft, nontender, nondistended. No obvious organomegaly appreciated. Bowel sounds are present and normoactive.  : No CVA tenderness.   MSK: No gross abnormalities appreciated. No limitations to AROM/PROM appreciated.   Extremities: No cyanosis, edema, or clubbing evident. Neurovascularly intact.   Neuro: A&Ox3. CN 2-12 grossly intact. Able to respond to questions appropriately and clearly. No acute focal neurologic deficits appreciated.  Psych: Appropriate mood and behavior.        Assessment/Plan   1) Recurrent  syncope  Probably due to dehydration as evident by bloodwork (Increased BUN/Creatinine)  However in light of recurrent orthostatic hypotension, start Midodrine  Encourage PO fluids  Will repeat echo to assess AS (Was moderate by previous echo)  Needs Outpatient Holter monitor    Peripheral IV 10/04/24 18 G Distal;Left Forearm (Active)   Site Assessment Clean 10/04/24 2156   Line Status Blood return noted 10/04/24 2156   Number of days: 1       Code Status:  Full Code    I spent 30 minutes in the professional and overall care of this patient.        Alexis Castillo MD

## 2024-10-06 VITALS
TEMPERATURE: 98.2 F | RESPIRATION RATE: 18 BRPM | DIASTOLIC BLOOD PRESSURE: 67 MMHG | HEART RATE: 75 BPM | OXYGEN SATURATION: 96 % | BODY MASS INDEX: 22.9 KG/M2 | SYSTOLIC BLOOD PRESSURE: 115 MMHG | WEIGHT: 160 LBS | HEIGHT: 70 IN

## 2024-10-06 PROBLEM — F33.1 MODERATE EPISODE OF RECURRENT MAJOR DEPRESSIVE DISORDER: Status: RESOLVED | Noted: 2024-06-11 | Resolved: 2024-10-06

## 2024-10-06 PROBLEM — E87.20 LACTIC ACIDOSIS: Status: RESOLVED | Noted: 2024-10-05 | Resolved: 2024-10-06

## 2024-10-06 PROBLEM — R55 SYNCOPE AND COLLAPSE: Status: RESOLVED | Noted: 2024-10-05 | Resolved: 2024-10-06

## 2024-10-06 PROBLEM — F43.10 POSTTRAUMATIC STRESS DISORDER: Status: RESOLVED | Noted: 2024-06-11 | Resolved: 2024-10-06

## 2024-10-06 PROBLEM — I95.9 HYPOTENSION: Status: RESOLVED | Noted: 2023-01-27 | Resolved: 2024-10-06

## 2024-10-06 PROBLEM — D69.6 THROMBOCYTOPENIA (CMS-HCC): Status: RESOLVED | Noted: 2024-10-05 | Resolved: 2024-10-06

## 2024-10-06 PROBLEM — N17.9 AKI (ACUTE KIDNEY INJURY) (CMS-HCC): Status: RESOLVED | Noted: 2024-09-16 | Resolved: 2024-10-06

## 2024-10-06 PROBLEM — R73.9 HYPERGLYCEMIA: Status: RESOLVED | Noted: 2024-10-05 | Resolved: 2024-10-06

## 2024-10-06 PROBLEM — R74.01 TRANSAMINITIS: Status: RESOLVED | Noted: 2024-10-05 | Resolved: 2024-10-06

## 2024-10-06 LAB
ANION GAP SERPL CALC-SCNC: 9 MMOL/L (ref 10–20)
BUN SERPL-MCNC: 21 MG/DL (ref 6–23)
CALCIUM SERPL-MCNC: 8.8 MG/DL (ref 8.6–10.3)
CHLORIDE SERPL-SCNC: 110 MMOL/L (ref 98–107)
CO2 SERPL-SCNC: 25 MMOL/L (ref 21–32)
CREAT SERPL-MCNC: 0.96 MG/DL (ref 0.5–1.3)
EGFRCR SERPLBLD CKD-EPI 2021: 88 ML/MIN/1.73M*2
ERYTHROCYTE [DISTWIDTH] IN BLOOD BY AUTOMATED COUNT: 14.2 % (ref 11.5–14.5)
GLUCOSE BLD MANUAL STRIP-MCNC: 119 MG/DL (ref 74–99)
GLUCOSE BLD MANUAL STRIP-MCNC: 218 MG/DL (ref 74–99)
GLUCOSE SERPL-MCNC: 113 MG/DL (ref 74–99)
HCT VFR BLD AUTO: 34.9 % (ref 41–52)
HGB BLD-MCNC: 11.7 G/DL (ref 13.5–17.5)
MAGNESIUM SERPL-MCNC: 1.64 MG/DL (ref 1.6–2.4)
MCH RBC QN AUTO: 29.3 PG (ref 26–34)
MCHC RBC AUTO-ENTMCNC: 33.5 G/DL (ref 32–36)
MCV RBC AUTO: 87 FL (ref 80–100)
NRBC BLD-RTO: 0 /100 WBCS (ref 0–0)
PLATELET # BLD AUTO: 128 X10*3/UL (ref 150–450)
POTASSIUM SERPL-SCNC: 3.8 MMOL/L (ref 3.5–5.3)
PSA SERPL-MCNC: 0.2 NG/ML
RBC # BLD AUTO: 4 X10*6/UL (ref 4.5–5.9)
SODIUM SERPL-SCNC: 140 MMOL/L (ref 136–145)
WBC # BLD AUTO: 5.4 X10*3/UL (ref 4.4–11.3)

## 2024-10-06 PROCEDURE — 2500000001 HC RX 250 WO HCPCS SELF ADMINISTERED DRUGS (ALT 637 FOR MEDICARE OP): Performed by: INTERNAL MEDICINE

## 2024-10-06 PROCEDURE — 80048 BASIC METABOLIC PNL TOTAL CA: CPT | Performed by: INTERNAL MEDICINE

## 2024-10-06 PROCEDURE — 2500000004 HC RX 250 GENERAL PHARMACY W/ HCPCS (ALT 636 FOR OP/ED): Performed by: STUDENT IN AN ORGANIZED HEALTH CARE EDUCATION/TRAINING PROGRAM

## 2024-10-06 PROCEDURE — 36415 COLL VENOUS BLD VENIPUNCTURE: CPT | Performed by: INTERNAL MEDICINE

## 2024-10-06 PROCEDURE — 2500000002 HC RX 250 W HCPCS SELF ADMINISTERED DRUGS (ALT 637 FOR MEDICARE OP, ALT 636 FOR OP/ED): Performed by: STUDENT IN AN ORGANIZED HEALTH CARE EDUCATION/TRAINING PROGRAM

## 2024-10-06 PROCEDURE — 83735 ASSAY OF MAGNESIUM: CPT | Performed by: INTERNAL MEDICINE

## 2024-10-06 PROCEDURE — 2500000001 HC RX 250 WO HCPCS SELF ADMINISTERED DRUGS (ALT 637 FOR MEDICARE OP): Performed by: STUDENT IN AN ORGANIZED HEALTH CARE EDUCATION/TRAINING PROGRAM

## 2024-10-06 PROCEDURE — 82947 ASSAY GLUCOSE BLOOD QUANT: CPT

## 2024-10-06 PROCEDURE — 99232 SBSQ HOSP IP/OBS MODERATE 35: CPT | Performed by: INTERNAL MEDICINE

## 2024-10-06 PROCEDURE — 85027 COMPLETE CBC AUTOMATED: CPT | Performed by: INTERNAL MEDICINE

## 2024-10-06 PROCEDURE — 84153 ASSAY OF PSA TOTAL: CPT | Performed by: INTERNAL MEDICINE

## 2024-10-06 PROCEDURE — 99239 HOSP IP/OBS DSCHRG MGMT >30: CPT | Performed by: INTERNAL MEDICINE

## 2024-10-06 RX ORDER — MIDODRINE HYDROCHLORIDE 2.5 MG/1
2.5 TABLET ORAL
Qty: 90 TABLET | Refills: 0 | Status: SHIPPED | OUTPATIENT
Start: 2024-10-06

## 2024-10-06 RX ORDER — METOPROLOL TARTRATE 25 MG/1
12.5 TABLET, FILM COATED ORAL 2 TIMES DAILY
Start: 2024-10-06

## 2024-10-06 RX ADMIN — EMPAGLIFLOZIN 25 MG: 25 TABLET, FILM COATED ORAL at 09:31

## 2024-10-06 RX ADMIN — ASPIRIN 81 MG: 81 TABLET, COATED ORAL at 09:31

## 2024-10-06 RX ADMIN — DULOXETINE HYDROCHLORIDE 60 MG: 30 CAPSULE, DELAYED RELEASE ORAL at 09:33

## 2024-10-06 RX ADMIN — ENOXAPARIN SODIUM 40 MG: 40 INJECTION SUBCUTANEOUS at 09:30

## 2024-10-06 RX ADMIN — METOPROLOL TARTRATE 12.5 MG: 25 TABLET, FILM COATED ORAL at 09:32

## 2024-10-06 RX ADMIN — BUPROPION HYDROCHLORIDE 150 MG: 150 TABLET, EXTENDED RELEASE ORAL at 09:31

## 2024-10-06 RX ADMIN — PREGABALIN 150 MG: 75 CAPSULE ORAL at 09:31

## 2024-10-06 RX ADMIN — PANTOPRAZOLE SODIUM 40 MG: 40 TABLET, DELAYED RELEASE ORAL at 09:33

## 2024-10-06 RX ADMIN — ALLOPURINOL 300 MG: 300 TABLET ORAL at 09:32

## 2024-10-06 RX ADMIN — MIDODRINE HYDROCHLORIDE 2.5 MG: 2.5 TABLET ORAL at 10:27

## 2024-10-06 RX ADMIN — INSULIN GLARGINE 29 UNITS: 100 INJECTION, SOLUTION SUBCUTANEOUS at 09:34

## 2024-10-06 RX ADMIN — TICAGRELOR 90 MG: 90 TABLET ORAL at 09:32

## 2024-10-06 ASSESSMENT — COGNITIVE AND FUNCTIONAL STATUS - GENERAL
DAILY ACTIVITIY SCORE: 24
MOBILITY SCORE: 24

## 2024-10-06 ASSESSMENT — PAIN SCALES - GENERAL: PAINLEVEL_OUTOF10: 0 - NO PAIN

## 2024-10-06 NOTE — PROGRESS NOTES
Subjective Data:  64 y.o. male with PMHx s/f CAD, HTN, aortic stenosis, DM2, asthma, gastric bypass presenting with syncope. Pt has a longstanding history of syncope. He says roughly once a month he gets very lightheaded and usually has to sit or lay down for it to resolve. Today he came in became he had one of these episodes while going up the stairs and lost consciousness completely and hit his head. This time he really had no preceding symptoms and did not feel it coming on. Afterwards he came to relatively quickly and felt fine afterwards. No seizures or convulsions noted during any of these episodes. No chest pain, nausea, vomiting, shortness of breath, or other cardiac symptoms during these episodes. He has had 9 heart caths in the past per the pt. At least one of these were done because he was having recurrent syncopal episodes back in 2021. He last saw Dr. Castillo in December of 2023. His records show he has had PCI of the Cx in 2020 and RCA and then repeat laser PCI of Cx done in 2021. He is on DAPT (ASA and Brilinta) and has not missed any of his doses as far as ne knows. He is taking all of his medications as prescribed. He was also admitted to [facility] last month in September for chronic recurrent epigastric pain and CRISSY that was attributed to gastroenteritis and improved with supportive care. He was admitted last year in October of 2023 for similar recurrent syncope episodes which was attributed to an adverse effect of Flomax, which was changed to Finasteride at the time.        Overnight Events:    10/6/2024 : Feels well. No dizziness     Objective Data:  Last Recorded Vitals:  Vitals:    10/05/24 1720 10/05/24 2238 10/06/24 0149 10/06/24 0519   BP: 123/74 107/60 111/65 95/57   BP Location: Right arm Right arm Right arm Right arm   Patient Position: Lying Lying Lying Lying   Pulse: 68 67 78 60   Resp: 15 16 17 17   Temp: 36.9 °C (98.5 °F) 37.7 °C (99.9 °F) 36.8 °C (98.3 °F) 36.6 °C (97.9 °F)   TempSrc:  Temporal Temporal Temporal Temporal   SpO2: 95% 96% 97% 95%   Weight:       Height:           Last Labs:  CBC - 10/6/2024:  5:09 AM  5.4 11.7 128    34.9      CMP - 10/6/2024:  5:09 AM  8.8 6.4 58 --- 0.4   4.5 4.1 87 57      PTT - No results in last year.  1.0   11.2 _     TROPHS   Date/Time Value Ref Range Status   10/04/2024 11:19 PM <3 0 - 20 ng/L Final   10/04/2024 10:03 PM 3 0 - 20 ng/L Final   09/16/2024 07:16 PM 3 0 - 20 ng/L Final     BNP   Date/Time Value Ref Range Status   10/04/2024 10:03 PM 10 0 - 99 pg/mL Final   10/27/2023 11:35 PM 16 0 - 99 pg/mL Final     HGBA1C   Date/Time Value Ref Range Status   10/28/2023 05:05 AM 10.3 see below % Final   04/27/2023 05:10 PM 8.4 % Final     Comment:          Diagnosis of Diabetes-Adults   Non-Diabetic: < or = 5.6%   Increased risk for developing diabetes: 5.7-6.4%   Diagnostic of diabetes: > or = 6.5%  .       Monitoring of Diabetes                Age (y)     Therapeutic Goal (%)   Adults:          >18           <7.0   Pediatrics:    13-18           <7.5                   7-12           <8.0                   0- 6            7.5-8.5   American Diabetes Association. Diabetes Care 33(S1), Jan 2010.     02/11/2020 01:15 PM 7.5 % Final     Comment:          Diagnosis of Diabetes-Adults   Non-Diabetic: < or = 5.6%   Increased risk for developing diabetes: 5.7-6.4%   Diagnostic of diabetes: > or = 6.5%  .       Monitoring of Diabetes                Age (y)     Therapeutic Goal (%)   Adults:          >18           <7.0   Pediatrics:    13-18           <7.5                   7-12           <8.0                   0- 6            7.5-8.5   American Diabetes Association. Diabetes Care 33(S1), Jan 2010.       LDLCALC   Date/Time Value Ref Range Status   10/28/2023 05:05 AM 40 <=99 mg/dL Final     Comment:                                 Near   Borderline      AGE      Desirable  Optimal    High     High     Very High     0-19 Y     0 - 109     ---    110-129   >/= 130      ----    20-24 Y     0 - 119     ---    120-159   >/= 160     ----      >24 Y     0 -  99   100-129  130-159   160-189     >/=190       VLDL   Date/Time Value Ref Range Status   10/28/2023 05:05 AM 15 0 - 40 mg/dL Final   05/26/2022 09:10 AM 18 0 - 40 mg/dL Final   03/08/2021 07:51 AM 32 0 - 40 mg/dL Final   01/18/2021 08:26 AM 53 0 - 40 mg/dL Final      Last I/O:  I/O last 3 completed shifts:  In: 2085.6 (28.7 mL/kg) [P.O.:440; I.V.:1645.6 (22.7 mL/kg)]  Out: - (0 mL/kg)   Weight: 72.6 kg     Past Cardiology Tests (Last 3 Years):  EKG:  ECG 12 lead 09/16/2024      ECG 12 lead 08/22/2024      ECG 12 lead 08/19/2024      ECG 12 Lead 12/18/2023    Echo:  Transthoracic Echo (TTE) Complete 10/05/2024      Transthoracic Echo (TTE) Complete 10/28/2023    Ejection Fractions:  EF   Date/Time Value Ref Range Status   10/05/2024 10:57 AM 62 %      Cath:  No results found for this or any previous visit from the past 1095 days.    Stress Test:  No results found for this or any previous visit from the past 1095 days.    Cardiac Imaging:  No results found for this or any previous visit from the past 1095 days.      Inpatient Medications:  Scheduled medications   Medication Dose Route Frequency    allopurinol  300 mg oral Daily    aspirin  81 mg oral Daily    atorvastatin  40 mg oral Nightly    buPROPion XL  150 mg oral q AM    DULoxetine  60 mg oral Daily    empagliflozin  25 mg oral Daily    enoxaparin  40 mg subcutaneous q24h    ezetimibe  10 mg oral Nightly    fluticasone  2 spray Each Nostril Nightly    insulin glargine  29 Units subcutaneous q AM    insulin lispro  0-5 Units subcutaneous TID    [Held by provider] lisinopril  5 mg oral Daily    metoprolol tartrate  12.5 mg oral BID    midodrine  2.5 mg oral TID    pantoprazole  40 mg oral Daily    Or    pantoprazole  40 mg intravenous Daily    pregabalin  150 mg oral BID    tamsulosin  0.4 mg oral Nightly    ticagrelor  90 mg oral BID     PRN medications   Medication     bisacodyl    bisacodyl    dextrose    dextrose    glucagon    glucagon    guaiFENesin    hydrALAZINE    melatonin    ondansetron    Or    ondansetron     Continuous Medications   Medication Dose Last Rate       Physical Exam:  Constitutional: Pleasant and cooperative. Laying in bed in no acute distress. Conversant.   Skin: Warm and dry; no obvious lesions, rashes, pallor, or jaundice. Good turgor.   Eyes: EOMI. Anicteric sclera.   ENT: Mucous membranes moist; no obvious injury or deformity appreciated.   Head and Neck: Normocephalic, atraumatic. ROM preserved. Trachea midline. No appreciable JVD.   Respiratory: Nonlabored on RA. Lungs clear to auscultation bilaterally without obvious adventitious sounds. Chest rise is equal.  Cardiovascular: RRR. 3/6 systolic murmur, gallop, or rub. Extremities are warm and well-perfused with good capillary refill (< 3 seconds). No chest wall tenderness.   GI: Abdomen soft, nontender, nondistended. No obvious organomegaly appreciated. Bowel sounds are present and normoactive.  : No CVA tenderness.   MSK: No gross abnormalities appreciated. No limitations to AROM/PROM appreciated.   Extremities: No cyanosis, edema, or clubbing evident. Neurovascularly intact.   Neuro: A&Ox3. CN 2-12 grossly intact. Able to respond to questions appropriately and clearly. No acute focal neurologic deficits appreciated.  Psych: Appropriate mood and behavior.     Assessment/Plan   1) Dizziness/Recurrent syncope  Probably due to dehydration as evident by bloodwork (Increased BUN/Creatinine)  However in light of recurrent orthostatic hypotension, start Midodrine  Encourage PO fluids  Will repeat echo to assess AS (Was moderate by previous echo)  Needs Outpatient Holter monitor  10/6/2024 : Echo with normal LV function and mod AS (unchanged from before)  Can discharge home on Midodrine  Holter as outpatient    Peripheral IV 10/04/24 18 G Distal;Left Forearm (Active)   Site Assessment Clean;Dry;Intact  10/05/24 2100   Dressing Status Clean;Dry 10/05/24 2100   Number of days: 2       Code Status:  Full Code    I spent 30 minutes in the professional and overall care of this patient.        Alexis Castillo MD

## 2024-10-06 NOTE — CARE PLAN
The patient's goals for the shift include      The clinical goals for the shift include Patient will be free from falls this shift.    Over the shift, the remains free from falls.

## 2024-10-06 NOTE — DISCHARGE SUMMARY
Discharge Diagnosis    Recurrent syncope, fall, CRISSY:  Possibly secondary to dehydration and  medication.  Started on IV fluid, amlodipine and lisinopril was discontinued.  Cardiology consulted and recommended starting on low-dose midodrine for suspected orthostatic hypotension.  Blood pressure remained stable, renal function improved.  Patient will be discharged home  Hx CAD:  Continue ASA and Plavix.  Continue home statin and Zetia     Hx gout:  Continue home allopurinol     Depression - continue home psych medications     Type II DM  Continue home Lantus and add SSI while inpatient.  Monitor sugars,     Hypertension  Discontinue lisinopril and Norvasc as patient was hypotensive on presentation  Will continue on metoprolol at lower dose  Evaluated by cardiology and started on low-dose midodrine  Monitor blood pressure adjust dose as needed     BPH  Continue home Flomax. Will have to check to see if he is still on this as it was discontinued during a previous admission for syncope.        Lactic acidosis  Secondary to tissue hypoperfusion  Improved with fluid restriction station and restoration of blood pressure    This discharge took greater than 35 minutes.    Test Results Pending At Discharge  Pending Labs       Order Current Status    Prostate Specific Antigen Collected (10/06/24 1043)            Hospital Course     Manish Lance is a 64 y.o. male with PMHx s/f CAD, HTN, aortic stenosis, DM2, asthma, gastric bypass presenting with syncope. Pt has a longstanding history of syncope. He says roughly once a month he gets very lightheaded and usually has to sit or lay down for it to resolve. Today he came in became he had one of these episodes while going up the stairs and lost consciousness completely and hit his head. This time he really had no preceding symptoms and did not feel it coming on. Afterwards he came to relatively quickly and felt fine afterwards. No seizures or convulsions noted during any of these  episodes. No chest pain, nausea, vomiting, shortness of breath, or other cardiac symptoms during these episodes. He has had 9 heart caths in the past per the pt. At least one of these were done because he was having recurrent syncopal episodes back in 2021. He last saw Dr. Castillo in December of 2023. His records show he has had PCI of the Cx in 2020 and RCA and then repeat laser PCI of Cx done in 2021. He is on DAPT (ASA and Brilinta) and has not missed any of his doses as far as ne knows. He is taking all of his medications as prescribed. He was also admitted to [facility] last month in September for chronic recurrent epigastric pain and CRISSY that was attributed to gastroenteritis and improved with supportive care. He was admitted last year in October of 2023 for similar recurrent syncope episodes which was attributed to an adverse effect of Flomax, which was changed to Finasteride at the time.     ED Course (Summary):   Vitals on presentation: 97.3 F, 68 bpm, 16 rr, 78/49 -> 125/73, 97% on RA  Labs: CMP glu 240, BUN 25, Cr 1.43, AST 58, ALT 87, lactate 3.1 -> 3.6,   BNP 10  Lipase 47  Trop 3 -> 3  CBC Hg 12.9, Plt 148  Imaging: CT head/cervical - No acute intracranial abnormality.   No evidence of cervical spine fracture or traumatic malalignment.   Intact C6-C7 ACDF. Degenerative change with mild to moderate central   canal stenosis at C4-C5 and moderate bilateral foraminal stenosis at   C5-C6.   EKG - sinus rhythm at 65 bpm  Interventions: LR 1 L bolus,  ml bolus, Admission for further workup.        10/05: Patient was seen and examined, feeling a bit better today no more dizziness, no chest pain or shortness of breath, no nausea vomiting, renal function gradually improving with IV fluid.  Evaluated by cardiology and recommended starting on midodrine for suspected orthostatic hypotension.     10/6: Patient will be discharged home on midodrine 2.5 mg 3 times daily.  Amlodipine and lisinopril was discontinued,  metoprolol decreased to 12.5 mg twice daily.  Patient was advised to monitor blood pressure at home and report to his PCP/cardiology if systolic blood pressure is more than 160 and diastolic more than 100.  Evaluated by cardiology-recommended starting on midodrine as well as Holter monitoring as an outpatient.  He has an appointment to see cardiology next week  Pertinent Physical Exam At Time of Discharge  Physical Exam  Patient is awake and orient, not in apparent distress  Eyes: PERRLA, no conjunctival congestion  Chest: Bilateral Air entry, no crackles or wheezing  Heart: s1S2 regular, no murmur  Abdomen: Soft, non tender, BS present  Ext:    Home Medications     Medication List      START taking these medications     midodrine 2.5 mg tablet; Commonly known as: Proamatine; Take 1 tablet   (2.5 mg) by mouth 3 times daily (morning, midday, late afternoon).     CONTINUE taking these medications     allopurinol 300 mg tablet; Commonly known as: Zyloprim   aspirin 81 mg EC tablet   aspirin-acetaminophen-caffeine 250-250-65 mg tablet; Commonly known as:   Excedrin Migraine   atorvastatin 40 mg tablet; Commonly known as: Lipitor   Brilinta 60 mg tablet; Generic drug: ticagrelor   buPROPion  mg 24 hr tablet; Commonly known as: Wellbutrin XL   cholecalciferol 50 MCG (2000 UT) tablet; Commonly known as: Vitamin D-3   DULoxetine 60 mg DR capsule; Commonly known as: Cymbalta   ezetimibe 10 mg tablet; Commonly known as: Zetia   Flomax 0.4 mg 24 hr capsule; Generic drug: tamsulosin   fluticasone 50 mcg/actuation nasal spray; Commonly known as: Flonase   Jardiance 25 mg; Generic drug: empagliflozin   Lantus U-100 Insulin 100 unit/mL injection; Generic drug: insulin   glargine   metFORMIN 1,000 mg tablet; Commonly known as: Glucophage   metoprolol tartrate 25 mg tablet; Commonly known as: Lopressor   nitroglycerin 0.4 mg SL tablet; Commonly known as: Nitrostat   omeprazole 20 mg DR capsule; Commonly known as: PriLOSEC    pregabalin 150 mg capsule; Commonly known as: Lyrica   ProAir HFA 90 mcg/actuation inhaler; Generic drug: albuterol   TylenoL 325 mg tablet; Generic drug: acetaminophen     STOP taking these medications     amLODIPine 5 mg tablet; Commonly known as: Norvasc   lisinopril 20 mg tablet   naproxen 375 mg tablet; Commonly known as: Naprosyn   ondansetron ODT 4 mg disintegrating tablet; Commonly known as:   Zofran-ODT       Outpatient Follow-Up  No follow-ups on file.     Marline Peres MD  10/6/2024  10:45 AM

## 2024-10-07 LAB
ATRIAL RATE: 66 BPM
P AXIS: 49 DEGREES
PR INTERVAL: 176 MS
Q ONSET: 251 MS
QRS COUNT: 11 BEATS
QRS DURATION: 104 MS
QT INTERVAL: 426 MS
QTC CALCULATION(BAZETT): 443 MS
QTC FREDERICIA: 437 MS
R AXIS: 31 DEGREES
T AXIS: 38 DEGREES
T OFFSET: 464 MS
VENTRICULAR RATE: 65 BPM

## 2024-10-08 DIAGNOSIS — R55 SYNCOPE AND COLLAPSE: Primary | ICD-10-CM

## 2024-10-10 ENCOUNTER — APPOINTMENT (OUTPATIENT)
Dept: CARDIOLOGY | Facility: HOSPITAL | Age: 64
End: 2024-10-10
Payer: OTHER GOVERNMENT

## 2024-10-11 ENCOUNTER — PATIENT OUTREACH (OUTPATIENT)
Dept: CARE COORDINATION | Facility: CLINIC | Age: 64
End: 2024-10-11
Payer: OTHER GOVERNMENT

## 2024-10-11 SDOH — ECONOMIC STABILITY: FOOD INSECURITY
ARE ANY OF YOUR NEEDS URGENT? FOR EXAMPLE, UNCERTAINTY OF WHERE YOU WILL GET YOUR NEXT MEAL OR NOT HAVING THE MEDICATIONS YOU NEED TO TAKE TOMORROW.: NO

## 2024-10-11 SDOH — ECONOMIC STABILITY: GENERAL: WOULD YOU LIKE HELP WITH ANY OF THE FOLLOWING NEEDS?: I DONT NEED HELP WITH ANY OF THESE

## 2024-10-17 ENCOUNTER — TELEPHONE (OUTPATIENT)
Dept: CARDIOLOGY | Facility: HOSPITAL | Age: 64
End: 2024-10-17
Payer: OTHER GOVERNMENT

## 2024-10-17 NOTE — TELEPHONE ENCOUNTER
Patient called for refill of Brilinta to be sent to   [id number] - [place], [place] - [address]  [address] [address]  Phone: [phone]  Fax: [phone]     Routing to Ligia TRISTAN.        Patient also requested that his holter monitor be mailed to him. Mailed Zio home kit to address on file 10/17.    Statement Selected

## 2024-10-18 DIAGNOSIS — I25.10 CAD IN NATIVE ARTERY: Primary | ICD-10-CM

## 2024-10-18 DIAGNOSIS — Z95.820 STATUS POST ANGIOPLASTY WITH STENT: ICD-10-CM

## 2024-10-21 ENCOUNTER — APPOINTMENT (OUTPATIENT)
Dept: GASTROENTEROLOGY | Facility: CLINIC | Age: 64
End: 2024-10-21
Payer: OTHER GOVERNMENT

## 2024-11-06 ENCOUNTER — PATIENT OUTREACH (OUTPATIENT)
Dept: CARE COORDINATION | Facility: CLINIC | Age: 64
End: 2024-11-06
Payer: OTHER GOVERNMENT

## 2024-11-11 ENCOUNTER — OFFICE VISIT (OUTPATIENT)
Dept: CARDIOLOGY | Facility: HOSPITAL | Age: 64
End: 2024-11-11
Payer: OTHER GOVERNMENT

## 2024-11-11 VITALS
HEIGHT: 70 IN | HEART RATE: 61 BPM | SYSTOLIC BLOOD PRESSURE: 118 MMHG | DIASTOLIC BLOOD PRESSURE: 70 MMHG | BODY MASS INDEX: 23.08 KG/M2 | WEIGHT: 161.2 LBS

## 2024-11-11 DIAGNOSIS — I25.10 CAD IN NATIVE ARTERY: ICD-10-CM

## 2024-11-14 ENCOUNTER — ANCILLARY PROCEDURE (OUTPATIENT)
Dept: CARDIOLOGY | Facility: HOSPITAL | Age: 64
End: 2024-11-14
Payer: COMMERCIAL

## 2024-11-14 DIAGNOSIS — R55 SYNCOPE AND COLLAPSE: ICD-10-CM

## 2024-11-14 PROCEDURE — 93246 EXT ECG>7D<15D RECORDING: CPT

## 2024-11-14 PROCEDURE — 93248 EXT ECG>7D<15D REV&INTERPJ: CPT | Performed by: INTERNAL MEDICINE

## 2024-11-22 NOTE — PROGRESS NOTES
Counseling:  The patient was counseled regarding diagnostic results, instructions for management, risk factor reductions, prognosis, patient and family education, impressions, risks and benefits of treatment options and importance of compliance with treatment.       Chief Complaint:   The patient presents today for post-hospitalization followup of syncope.      History Of Present Illness:    Manish Lance is a 64 year old male patient who presents today for post-hospitalization followup of syncope. His PMH is significant for CAD s/p PCI of the Cx and RCA and then repeat laser PCI of Cx, HTN, DM type 2, hyperlipidemia, BHARTI and palpitations. The patient was admitted to hospital from 09/16/2024 to 09/18/2024 with a presyncope/syncope and abdominal pain. While in hospital, he was found to have orthostatic hypotension that steadily resolved with resolution of hyperkalemia. As for the patient's abdominal pain, this appeared to be chronic and recurrent with extensive workup in the past showing some duodenal inflammation, but no ulcers, and a patent SMA when last assessed 1 year ago. GI was consulted while inpatient who recommended no further workup or intervention and outpatient followup. The patient was readmitted to hospital from 10/04/2024 to 10/06/2024 with recurrent syncope. While in hospital, echocardiogram demonstrated an EF of 62%, normal RV systolic function, moderate aortic stenosis and mild aortic regurgitation. The patient's symptoms were likely s/t dehydration as evident by his labs (increased BUN/Creatinine); however, in light of recurrent orthostatic hypotension, the patient was started on midodrine. The patient was discharged home with an outpatient Zio patch monitor. Zio patch monitoring performed from 10/23/2024 to 11/02/2024 was unremarkable. Today, the patient states that he has been feeling significantly improved since being discharged home from hospital. He denies any recurrent dizziness,  "lightheadedness or syncope since starting midodrine. He unfortunately continues to lose weight with no explanation. The patient is compliant with his prescribed medications.      Last Recorded Vitals:  Vitals:    11/29/24 1531   BP: 130/68   BP Location: Left arm   Patient Position: Sitting   BP Cuff Size: Adult   Pulse: 61   Weight: 73.1 kg (161 lb 3.2 oz)   Height: 1.778 m (5' 10\")         Past Surgical History:  He has a past surgical history that includes Coronary angioplasty (07/14/2017); Gastric bypass (07/14/2017); Back surgery (07/14/2017); and Carpal tunnel release (07/14/2017).      Social History:  He reports that he quit smoking about 29 years ago. His smoking use included cigarettes. He has quit using smokeless tobacco. He reports that he does not currently use alcohol. He reports that he does not use drugs.    Family History:  Family History   Problem Relation Name Age of Onset    Coronary artery disease Mother      Coronary artery disease Father      Coronary artery disease Brother          Allergies:  Patient has no known allergies.    Outpatient Medications:  Current Outpatient Medications   Medication Instructions    acetaminophen (TYLENOL) 325 mg, Every 6 hours PRN    albuterol (ProAir HFA) 90 mcg/actuation inhaler INHALE 1-2 PUFFS QID PRN SHORTNESS OR BREATH/WHEEZING    allopurinol (ZYLOPRIM) 300 mg, Daily    aspirin-acetaminophen-caffeine (Excedrin Migraine) 250-250-65 mg tablet 1 tablet, As needed    aspirin 81 mg, Daily    atorvastatin (LIPITOR) 40 mg, Nightly    buPROPion XL (WELLBUTRIN XL) 150 mg, Every morning    cholecalciferol (Vitamin D-3) 50 MCG (2000 UT) tablet 1 tablet, Daily    DULoxetine (CYMBALTA) 60 mg, Daily    empagliflozin (JARDIANCE) 25 mg, Daily    ezetimibe (Zetia) 10 mg tablet 1 tablet, Daily    fluticasone (Flonase) 50 mcg/actuation nasal spray 2 sprays, Nightly    insulin glargine (LANTUS U-100 INSULIN) 29 Units, Every morning    metFORMIN (Glucophage) 1,000 mg tablet 1 " tablet, Every 12 hours    metoprolol tartrate (LOPRESSOR) 12.5 mg, oral, 2 times daily    midodrine (PROAMATINE) 2.5 mg, oral, 3 times daily (morning, midday, late afternoon)    nitroglycerin (NITROSTAT) 0.4 mg, Every 5 min PRN    omeprazole (PriLOSEC) 20 mg DR capsule 1 capsule, 2 times daily    pregabalin (Lyrica) 150 mg capsule 1 capsule, 2 times daily    tamsulosin (Flomax) 0.4 mg 24 hr capsule 1 capsule, Nightly    ticagrelor (BRILINTA) 60 mg, oral, 2 times daily     Review of Systems   All other systems reviewed and are negative.     Physical Exam:  Constitutional:       Appearance: Healthy appearance. Not in distress.   Neck:      Vascular: No JVR. JVD normal.   Pulmonary:      Effort: Pulmonary effort is normal.      Breath sounds: Normal breath sounds. No wheezing. No rhonchi. No rales.   Chest:      Chest wall: Not tender to palpatation.   Cardiovascular:      PMI at left midclavicular line. Normal rate. Regular rhythm. Normal S1. Normal S2.       Murmurs: There is no murmur.      No gallop.  No click. No rub.   Pulses:     Intact distal pulses.   Edema:     Peripheral edema absent.   Abdominal:      General: Bowel sounds are normal.      Palpations: Abdomen is soft.      Tenderness: There is no abdominal tenderness.   Musculoskeletal: Normal range of motion.         General: No tenderness. Skin:     General: Skin is warm and dry.   Neurological:      General: No focal deficit present.      Mental Status: Alert and oriented to person, place and time.          Last Labs:  CBC -  Lab Results   Component Value Date    WBC 5.4 10/06/2024    HGB 11.7 (L) 10/06/2024    HCT 34.9 (L) 10/06/2024    MCV 87 10/06/2024     (L) 10/06/2024       CMP -  Lab Results   Component Value Date    CALCIUM 8.8 10/06/2024    PHOS 4.5 10/28/2023    PROT 6.4 10/04/2024    ALBUMIN 4.1 10/04/2024    AST 58 (H) 10/04/2024    ALT 87 (H) 10/04/2024    ALKPHOS 57 10/04/2024    BILITOT 0.4 10/04/2024       LIPID PANEL -   Lab  Results   Component Value Date    CHOL 86 10/28/2023    TRIG 76 10/28/2023    HDL 31.0 10/28/2023    CHHDL 2.8 10/28/2023    LDLF 41 05/26/2022    VLDL 15 10/28/2023    NHDL 55 10/28/2023       RENAL FUNCTION PANEL -   Lab Results   Component Value Date    GLUCOSE 113 (H) 10/06/2024     10/06/2024    K 3.8 10/06/2024     (H) 10/06/2024    CO2 25 10/06/2024    ANIONGAP 9 (L) 10/06/2024    BUN 21 10/06/2024    CREATININE 0.96 10/06/2024    GFRMALE 52 (A) 05/07/2023    CALCIUM 8.8 10/06/2024    PHOS 4.5 10/28/2023    ALBUMIN 4.1 10/04/2024        Lab Results   Component Value Date    BNP 10 10/04/2024    HGBA1C 10.3 (H) 10/28/2023       Last Cardiology Tests:  10/23/2024 to 11/02/2024 - Holter Monitor  1. Predominant underlying rhythm was sinus rhythm; min HR 50 bpm, max  bpm, avg HR 65 bpm.  2. Isolated SVEs were rare, and no SVE couplets or SVE triplets were present.  3. Isolated VEs were rare, VE couplets were rare, and no VE triplets were present.     10/05/2024 - TTE  1. The left ventricular systolic function is normal, with a Nickerson's biplane calculated ejection fraction of 62%.  2. There is normal right ventricular global systolic function.  3. Moderate aortic valve stenosis.  4. Mild aortic valve regurgitation.    10/28/2023 - TTE  1. Left ventricular systolic function is normal with a 60% estimated ejection fraction.  2. Moderate aortic valve stenosis.  3. Mild aortic valve regurgitation.     07/20/2022 - Exercise Stress Echo  1. No clinical, echocardiographic or electrocardiographic evidence for ischemia at maximal workload.  2. No ECG changes from baseline.  3. The adequate level of stress was achieved.     12/13/2021 - TTE  1. The left ventricular systolic function is normal with a 60-65% estimated ejection fraction.  2. There is moderate concentric left ventricular hypertrophy.  3. Mild aortic valve stenosis.  4. There is mild aortic valve regurgitation.     07/19/2021 to 08/17/2021 -  Event Monitoring  1. Baseline transmission showing sinus rhythm with a heart rate of 67 bpm.   2. PAC burden of less than 1%.  3. PVC burden of 1%.  4. 50 symptomatic events with complaints include chest pain, shortness of breath, or other correlating with sinus rhythm or sinus tachycardia.      06/10/2021 - Cardiac Catheterization (LH)  1. Single vessel coronary artery disease without proximal left anterior descending involvement.  2. Culprit vessel(s): circumflex.  3. Successful PCI of CX.     03/08/2021 - Vascular Lab Renal Artery U/S  1. Renal Artery Duplex: No evidence of an abdominal aortic aneurysm.  2. Right Renal Artery: Right renal arteries demonstrate no evidence of hemodynamically significant stenosis. The right renal vein is widely patent.  3. Left Renal Artery: Left renal arteries demonstrate no evidence of hemodynamically significant stenosis. The left renal vein is widely patent.  4. Additional Findings: Technically difficult due to body habitus and overlying bowel gas.     05/22/2020 - Exercise Stress Echo  1. The resting ejection fraction was estimated at 55 to 60% with a peak exercise ejection fraction estimated at 55 to 60%.  2. Normal global left ventricular systolic function.  3. Stage: Impost: Basal and mid inferior septum and basal and mid inferolateral wall are abnormal.  4. Hypertensive BP response may reduce specificity of the examination.  5. At peak, there are stress-induced regional wall motion abnormalities.  6. No electrocardiographic evidence for ischemia at maximal workload.  7. The adequate level of stress was achieved.     02/18/2020 - Cardiac Catheterization (LH)  1. Single vessel coronary artery disease without proximal left anterior descending involvement.  2. Successful PCI of Cx.     10/03/2019 - Cardiac Catheterization (LH)  Single vessel coronary artery disease without proximal left anterior descending involvement.     05/21/2019 - Echocardiogram   1. Normal left  ventricular size and regional systolic function with an ejection fraction of 55%.  2. Normal right ventricular size and systolic function.  3. Trace mitral regurgitation.  4. Mild aortic regurgitation.   5. Mild aortic stenosis.  6. Trace tricuspid regurgitation.     04/24/2019 - Cardiac Catheterization (LH)  1. Double vessel coronary artery disease without proximal left anterior descending involvement.  2. Successful PTCA/stent of Cx.     03/25/2019 - Echocardiogram   1. Normal left ventricular regional systolic function with an ejection fraction of 55%.  2. Normal right ventricular size and systolic function.  3. Trace mitral regurgitation.  4. Mild aortic regurgitation.   5. Mild aortic stenosis.  6. Trace tricuspid regurgitation.     08/31/2018 - Stress Test  Normal myocardial perfusion scan with normal LV function after exercise stress.     06/06/2018 - Exercise Stress Echocardiogram   1. Abnormal graded exercise treadmill stress test secondary to EKG changes without chest pain.  2. Exercise capacity: Average exercise capacity.  3. Fowler treadmill score is: No applicable for this patient.      Lab review: I have personally reviewed the laboratory result(s).  Diagnostic review: I have personally reviewed the result(s) of the Echocardiogram.     Assessment/Plan   1) CAD s/p Repeat PCI of Cx Feb 2020 and Repeat PCI Cx June 2021, Moderate Aortic Stenosis with Mild Regurgitation  On DAPT - ASA 81 mg daily, Brilinta 60 mg daily  On metoprolol tartrate 12.5 mg BID, atorvastatin 40 mg daily, Zetia 10 mg daily  Needs aggressive control of DM per PCP  Needs aggressive control of BP   TTE 10/05/2024 with LVEF 62%, normal RV systolic function, moderate aortic stenosis, mild aortic regurgitation  Stable  Continue current medical Rx   Followup with Yasmin Gore NP, in 6 months     2) HTN   Stable  On metoprolol tartrate 12.5 mg BID, midodrine 2.5 mg TID  Imdur previously discontinued  Renal artery duplex negative TALIA March  2021  Hospital admission 09/16/2024 to 09/18/2024 with presyncope/syncope - found to have orthostatic hypotension that steadily resolved with resolution of hyperkalemia  Hospital admission 10/04/2024 to 10/06/2024 with recurrent syncope and orthostatic hypotension - started on midodrine.  BP improved with midodrine with no recurrent hypotensive episodes   Increase p.o. fluids   Continue current medical Rx   Followup with Yasmin Gore NP, in 6 months    3) Hyperlipidemia  Goal LDL <70  On atorvastatin 40 mg daily, Zetia 10 mg daily  Low fat/low cholesterol diet  Lipid panel 10/28/2023 with LDL of 40; at goal  Continue current medical Rx   Followup with Yasmin Gore NP, in 6 months     4) Weight Loss - Abdominal Pain  CT chest and abdomen negative for malignancy  Previously referred to GI and Internal Medicine  Hospital admission 09/16/2024 to 09/18/2024 with abdominal pain  GI consulted - appears to be chronic and recurrent, extensive workup in the past showing some duodenal inflammation, but no ulcers, and a patent SMA when last assessed 1 year ago.   No further workup or intervention required as per GI - advised outpatient followup.    5) Recurrent Syncope  On midodrine 2.5 mg TID  Hospital admission 09/16/2024 to 09/18/2024 with presyncope/syncope - found to have orthostatic hypotension that steadily resolved with resolution of hyperkalemia.  Hospital admission 10/04/2024 to 10/06/2024 with recurrent syncope  TTE with LVEF 62%, normal RV systolic function, moderate aortic stenosis, mild aortic regurgitation  Symptoms likely s/t dehydration as evident by labs (increased BUN/Creatinine)  Patient started on midodrine in light of recurrent orthostatic hypotension  Discharged home with outpatient Zio patch monitor  Zio patch monitor unremarkable   Denies recurrent dizziness, lightheadedness or syncope  Increase p.o. fluids   Continue current medical Rx   Followup with Yasmin Gore NP, in 6 months        Scribe  Attestation  By signing my name below, I, Angelica Ordonez   attest that this documentation has been prepared under the direction and in the presence of Alexis Castillo MD.

## 2024-11-23 PROBLEM — G31.84 MILD NEUROCOGNITIVE DISORDER: Status: ACTIVE | Noted: 2024-11-23

## 2024-11-29 ENCOUNTER — OFFICE VISIT (OUTPATIENT)
Dept: CARDIOLOGY | Facility: HOSPITAL | Age: 64
End: 2024-11-29
Payer: OTHER GOVERNMENT

## 2024-11-29 VITALS
HEART RATE: 61 BPM | SYSTOLIC BLOOD PRESSURE: 130 MMHG | DIASTOLIC BLOOD PRESSURE: 68 MMHG | WEIGHT: 161.2 LBS | HEIGHT: 70 IN | BODY MASS INDEX: 23.08 KG/M2

## 2024-11-29 DIAGNOSIS — R55 SYNCOPE AND COLLAPSE: ICD-10-CM

## 2024-11-29 PROCEDURE — 99213 OFFICE O/P EST LOW 20 MIN: CPT | Performed by: INTERNAL MEDICINE

## 2024-11-29 PROCEDURE — 3078F DIAST BP <80 MM HG: CPT | Performed by: INTERNAL MEDICINE

## 2024-11-29 PROCEDURE — 3008F BODY MASS INDEX DOCD: CPT | Performed by: INTERNAL MEDICINE

## 2024-11-29 PROCEDURE — 3075F SYST BP GE 130 - 139MM HG: CPT | Performed by: INTERNAL MEDICINE

## 2024-11-29 NOTE — PATIENT INSTRUCTIONS
Continue all current medications as prescribed.   Please be sure to keep yourself well hydrated throughout the day.  Followup with Yasmin Gore NP, in 6 months.      If you have any questions or cardiac concerns, please call our office at [phone].

## 2024-11-29 NOTE — Clinical Note
November 29, 2024     No Recipients    Patient: Manish Lance   YOB: 1960   Date of Visit: 11/29/2024       Dear Dr. Coker Recipients:    Thank you for referring Manish Lance to me for evaluation. Below are my notes for this consultation.  If you have questions, please do not hesitate to call me. I look forward to following your patient along with you.       Sincerely,     Alexis Castillo MD      CC: No Recipients  ______________________________________________________________________________________    Counseling:  The patient was counseled regarding diagnostic results, instructions for management, risk factor reductions, prognosis, patient and family education, impressions, risks and benefits of treatment options and importance of compliance with treatment.       Chief Complaint:   The patient presents today for post-hospitalization followup of syncope.      History Of Present Illness:    Manish Lance is a 64 year old male patient who presents today for post-hospitalization followup of syncope. His PMH is significant for CAD s/p PCI of the Cx and RCA and then repeat laser PCI of Cx, HTN, DM type 2, hyperlipidemia, BHARTI and palpitations. The patient was admitted to hospital from 09/16/2024 to 09/18/2024 with a presyncope/syncope and abdominal pain. While in hospital, he was found to have orthostatic hypotension that steadily resolved with resolution of hyperkalemia. As for the patient's abdominal pain, this appeared to be chronic and recurrent with extensive workup in the past showing some duodenal inflammation, but no ulcers, and a patent SMA when last assessed 1 year ago. GI was consulted while inpatient who recommended no further workup or intervention and outpatient followup. The patient was readmitted to hospital from 10/04/2024 to 10/06/2024 with recurrent syncope. While in hospital, echocardiogram demonstrated an EF of 62%, normal RV systolic function, moderate aortic stenosis and mild  "aortic regurgitation. The patient's symptoms were likely s/t dehydration as evident by his labs (increased BUN/Creatinine); however, in light of recurrent orthostatic hypotension, the patient was started on midodrine. The patient was discharged home with an outpatient Zio patch monitor. Zio patch monitoring performed from 10/23/2024 to 11/02/2024 was unremarkable. Today, the patient states that he has been feeling significantly improved since being discharged home from hospital. He denies any recurrent dizziness, lightheadedness or syncope since starting midodrine. He unfortunately continues to lose weight with no explanation. The patient is compliant with his prescribed medications.      Last Recorded Vitals:  Vitals:    11/29/24 1531   BP: 130/68   BP Location: Left arm   Patient Position: Sitting   BP Cuff Size: Adult   Pulse: 61   Weight: 73.1 kg (161 lb 3.2 oz)   Height: 1.778 m (5' 10\")         Past Surgical History:  He has a past surgical history that includes Coronary angioplasty (07/14/2017); Gastric bypass (07/14/2017); Back surgery (07/14/2017); and Carpal tunnel release (07/14/2017).      Social History:  He reports that he quit smoking about 29 years ago. His smoking use included cigarettes. He has quit using smokeless tobacco. He reports that he does not currently use alcohol. He reports that he does not use drugs.    Family History:  Family History   Problem Relation Name Age of Onset    Coronary artery disease Mother      Coronary artery disease Father      Coronary artery disease Brother          Allergies:  Patient has no known allergies.    Outpatient Medications:  Current Outpatient Medications   Medication Instructions    acetaminophen (TYLENOL) 325 mg, Every 6 hours PRN    albuterol (ProAir HFA) 90 mcg/actuation inhaler INHALE 1-2 PUFFS QID PRN SHORTNESS OR BREATH/WHEEZING    allopurinol (ZYLOPRIM) 300 mg, Daily    aspirin-acetaminophen-caffeine (Excedrin Migraine) 250-250-65 mg tablet 1 " tablet, As needed    aspirin 81 mg, Daily    atorvastatin (LIPITOR) 40 mg, Nightly    buPROPion XL (WELLBUTRIN XL) 150 mg, Every morning    cholecalciferol (Vitamin D-3) 50 MCG (2000 UT) tablet 1 tablet, Daily    DULoxetine (CYMBALTA) 60 mg, Daily    empagliflozin (JARDIANCE) 25 mg, Daily    ezetimibe (Zetia) 10 mg tablet 1 tablet, Daily    fluticasone (Flonase) 50 mcg/actuation nasal spray 2 sprays, Nightly    insulin glargine (LANTUS U-100 INSULIN) 29 Units, Every morning    metFORMIN (Glucophage) 1,000 mg tablet 1 tablet, Every 12 hours    metoprolol tartrate (LOPRESSOR) 12.5 mg, oral, 2 times daily    midodrine (PROAMATINE) 2.5 mg, oral, 3 times daily (morning, midday, late afternoon)    nitroglycerin (NITROSTAT) 0.4 mg, Every 5 min PRN    omeprazole (PriLOSEC) 20 mg DR capsule 1 capsule, 2 times daily    pregabalin (Lyrica) 150 mg capsule 1 capsule, 2 times daily    tamsulosin (Flomax) 0.4 mg 24 hr capsule 1 capsule, Nightly    ticagrelor (BRILINTA) 60 mg, oral, 2 times daily     Review of Systems   All other systems reviewed and are negative.     Physical Exam:  Constitutional:       Appearance: Healthy appearance. Not in distress.   Neck:      Vascular: No JVR. JVD normal.   Pulmonary:      Effort: Pulmonary effort is normal.      Breath sounds: Normal breath sounds. No wheezing. No rhonchi. No rales.   Chest:      Chest wall: Not tender to palpatation.   Cardiovascular:      PMI at left midclavicular line. Normal rate. Regular rhythm. Normal S1. Normal S2.       Murmurs: There is no murmur.      No gallop.  No click. No rub.   Pulses:     Intact distal pulses.   Edema:     Peripheral edema absent.   Abdominal:      General: Bowel sounds are normal.      Palpations: Abdomen is soft.      Tenderness: There is no abdominal tenderness.   Musculoskeletal: Normal range of motion.         General: No tenderness. Skin:     General: Skin is warm and dry.   Neurological:      General: No focal deficit present.       Mental Status: Alert and oriented to person, place and time.          Last Labs:  CBC -  Lab Results   Component Value Date    WBC 5.4 10/06/2024    HGB 11.7 (L) 10/06/2024    HCT 34.9 (L) 10/06/2024    MCV 87 10/06/2024     (L) 10/06/2024       CMP -  Lab Results   Component Value Date    CALCIUM 8.8 10/06/2024    PHOS 4.5 10/28/2023    PROT 6.4 10/04/2024    ALBUMIN 4.1 10/04/2024    AST 58 (H) 10/04/2024    ALT 87 (H) 10/04/2024    ALKPHOS 57 10/04/2024    BILITOT 0.4 10/04/2024       LIPID PANEL -   Lab Results   Component Value Date    CHOL 86 10/28/2023    TRIG 76 10/28/2023    HDL 31.0 10/28/2023    CHHDL 2.8 10/28/2023    LDLF 41 05/26/2022    VLDL 15 10/28/2023    NHDL 55 10/28/2023       RENAL FUNCTION PANEL -   Lab Results   Component Value Date    GLUCOSE 113 (H) 10/06/2024     10/06/2024    K 3.8 10/06/2024     (H) 10/06/2024    CO2 25 10/06/2024    ANIONGAP 9 (L) 10/06/2024    BUN 21 10/06/2024    CREATININE 0.96 10/06/2024    GFRMALE 52 (A) 05/07/2023    CALCIUM 8.8 10/06/2024    PHOS 4.5 10/28/2023    ALBUMIN 4.1 10/04/2024        Lab Results   Component Value Date    BNP 10 10/04/2024    HGBA1C 10.3 (H) 10/28/2023       Last Cardiology Tests:  10/23/2024 to 11/02/2024 - Holter Monitor  1. Predominant underlying rhythm was sinus rhythm; min HR 50 bpm, max  bpm, avg HR 65 bpm.  2. Isolated SVEs were rare, and no SVE couplets or SVE triplets were present.  3. Isolated VEs were rare, VE couplets were rare, and no VE triplets were present.     10/05/2024 - TTE  1. The left ventricular systolic function is normal, with a Nickerson's biplane calculated ejection fraction of 62%.  2. There is normal right ventricular global systolic function.  3. Moderate aortic valve stenosis.  4. Mild aortic valve regurgitation.    10/28/2023 - TTE  1. Left ventricular systolic function is normal with a 60% estimated ejection fraction.  2. Moderate aortic valve stenosis.  3. Mild aortic valve  regurgitation.     07/20/2022 - Exercise Stress Echo  1. No clinical, echocardiographic or electrocardiographic evidence for ischemia at maximal workload.  2. No ECG changes from baseline.  3. The adequate level of stress was achieved.     12/13/2021 - TTE  1. The left ventricular systolic function is normal with a 60-65% estimated ejection fraction.  2. There is moderate concentric left ventricular hypertrophy.  3. Mild aortic valve stenosis.  4. There is mild aortic valve regurgitation.     07/19/2021 to 08/17/2021 - Event Monitoring  1. Baseline transmission showing sinus rhythm with a heart rate of 67 bpm.   2. PAC burden of less than 1%.  3. PVC burden of 1%.  4. 50 symptomatic events with complaints include chest pain, shortness of breath, or other correlating with sinus rhythm or sinus tachycardia.      06/10/2021 - Cardiac Catheterization (LH)  1. Single vessel coronary artery disease without proximal left anterior descending involvement.  2. Culprit vessel(s): circumflex.  3. Successful PCI of CX.     03/08/2021 - Vascular Lab Renal Artery U/S  1. Renal Artery Duplex: No evidence of an abdominal aortic aneurysm.  2. Right Renal Artery: Right renal arteries demonstrate no evidence of hemodynamically significant stenosis. The right renal vein is widely patent.  3. Left Renal Artery: Left renal arteries demonstrate no evidence of hemodynamically significant stenosis. The left renal vein is widely patent.  4. Additional Findings: Technically difficult due to body habitus and overlying bowel gas.     05/22/2020 - Exercise Stress Echo  1. The resting ejection fraction was estimated at 55 to 60% with a peak exercise ejection fraction estimated at 55 to 60%.  2. Normal global left ventricular systolic function.  3. Stage: Impost: Basal and mid inferior septum and basal and mid inferolateral wall are abnormal.  4. Hypertensive BP response may reduce specificity of the examination.  5. At peak, there are  stress-induced regional wall motion abnormalities.  6. No electrocardiographic evidence for ischemia at maximal workload.  7. The adequate level of stress was achieved.     02/18/2020 - Cardiac Catheterization (LH)  1. Single vessel coronary artery disease without proximal left anterior descending involvement.  2. Successful PCI of Cx.     10/03/2019 - Cardiac Catheterization (LH)  Single vessel coronary artery disease without proximal left anterior descending involvement.     05/21/2019 - Echocardiogram   1. Normal left ventricular size and regional systolic function with an ejection fraction of 55%.  2. Normal right ventricular size and systolic function.  3. Trace mitral regurgitation.  4. Mild aortic regurgitation.   5. Mild aortic stenosis.  6. Trace tricuspid regurgitation.     04/24/2019 - Cardiac Catheterization (LH)  1. Double vessel coronary artery disease without proximal left anterior descending involvement.  2. Successful PTCA/stent of Cx.     03/25/2019 - Echocardiogram   1. Normal left ventricular regional systolic function with an ejection fraction of 55%.  2. Normal right ventricular size and systolic function.  3. Trace mitral regurgitation.  4. Mild aortic regurgitation.   5. Mild aortic stenosis.  6. Trace tricuspid regurgitation.     08/31/2018 - Stress Test  Normal myocardial perfusion scan with normal LV function after exercise stress.     06/06/2018 - Exercise Stress Echocardiogram   1. Abnormal graded exercise treadmill stress test secondary to EKG changes without chest pain.  2. Exercise capacity: Average exercise capacity.  3. Fowler treadmill score is: No applicable for this patient.      Lab review: I have personally reviewed the laboratory result(s).  Diagnostic review: I have personally reviewed the result(s) of the Echocardiogram.     Assessment/Plan  1) CAD s/p Repeat PCI of Cx Feb 2020 and Repeat PCI Cx June 2021, Moderate Aortic Stenosis with Mild Regurgitation  On DAPT - ASA 81 mg  daily, Brilinta 60 mg daily  On metoprolol tartrate 12.5 mg BID, atorvastatin 40 mg daily, Zetia 10 mg daily  Needs aggressive control of DM per PCP  Needs aggressive control of BP   TTE 10/05/2024 with LVEF 62%, normal RV systolic function, moderate aortic stenosis, mild aortic regurgitation  Stable  Continue current medical Rx   Followup with Yasmin Gore NP, in 6 months     2) HTN   Stable  On metoprolol tartrate 12.5 mg BID, midodrine 2.5 mg TID  Imdur previously discontinued  Renal artery duplex negative TALIA March 2021  Hospital admission 09/16/2024 to 09/18/2024 with presyncope/syncope - found to have orthostatic hypotension that steadily resolved with resolution of hyperkalemia  Hospital admission 10/04/2024 to 10/06/2024 with recurrent syncope and orthostatic hypotension - started on midodrine.  BP improved with midodrine with no recurrent hypotensive episodes   Increase p.o. fluids   Continue current medical Rx   Followup with Yasmin Gore NP, in 6 months    3) Hyperlipidemia  Goal LDL <70  On atorvastatin 40 mg daily, Zetia 10 mg daily  Low fat/low cholesterol diet  Lipid panel 10/28/2023 with LDL of 40; at goal  Continue current medical Rx   Followup with Yasmin Gore NP, in 6 months     4) Weight Loss - Abdominal Pain  CT chest and abdomen negative for malignancy  Previously referred to GI and Internal Medicine  Hospital admission 09/16/2024 to 09/18/2024 with abdominal pain  GI consulted - appears to be chronic and recurrent, extensive workup in the past showing some duodenal inflammation, but no ulcers, and a patent SMA when last assessed 1 year ago.   No further workup or intervention required as per GI - advised outpatient followup.    5) Recurrent Syncope  On midodrine 2.5 mg TID  Hospital admission 09/16/2024 to 09/18/2024 with presyncope/syncope - found to have orthostatic hypotension that steadily resolved with resolution of hyperkalemia.  Hospital admission 10/04/2024 to 10/06/2024 with  recurrent syncope  TTE with LVEF 62%, normal RV systolic function, moderate aortic stenosis, mild aortic regurgitation  Symptoms likely s/t dehydration as evident by labs (increased BUN/Creatinine)  Patient started on midodrine in light of recurrent orthostatic hypotension  Discharged home with outpatient Zio patch monitor  Zio patch monitor unremarkable   Denies recurrent dizziness, lightheadedness or syncope  Increase p.o. fluids   Continue current medical Rx   Followup with Yasmin Gore NP, in 6 months        Scribe Attestation  By signing my name below, I, Angelica Ordonez   attest that this documentation has been prepared under the direction and in the presence of Alexis Castillo MD.

## 2025-01-29 ENCOUNTER — HOSPITAL ENCOUNTER (OUTPATIENT)
Facility: HOSPITAL | Age: 65
Setting detail: OBSERVATION
Discharge: HOME | End: 2025-02-02
Attending: EMERGENCY MEDICINE | Admitting: INTERNAL MEDICINE
Payer: OTHER GOVERNMENT

## 2025-01-29 ENCOUNTER — APPOINTMENT (OUTPATIENT)
Dept: RADIOLOGY | Facility: HOSPITAL | Age: 65
End: 2025-01-29
Payer: OTHER GOVERNMENT

## 2025-01-29 DIAGNOSIS — R55 SYNCOPE, UNSPECIFIED SYNCOPE TYPE: ICD-10-CM

## 2025-01-29 DIAGNOSIS — I25.10 CAD IN NATIVE ARTERY: ICD-10-CM

## 2025-01-29 DIAGNOSIS — S09.90XA CLOSED HEAD INJURY, INITIAL ENCOUNTER: ICD-10-CM

## 2025-01-29 DIAGNOSIS — I95.1 ORTHOSTATIC HYPOTENSION: ICD-10-CM

## 2025-01-29 DIAGNOSIS — I95.1 ORTHOSTASIS: ICD-10-CM
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
## 2025-01-29 DIAGNOSIS — S00.83XA CONTUSION OF FACE, INITIAL ENCOUNTER: ICD-10-CM

## 2025-01-29 DIAGNOSIS — E86.0 DEHYDRATION: ICD-10-CM

## 2025-01-29 DIAGNOSIS — N40.0 BENIGN PROSTATIC HYPERPLASIA, UNSPECIFIED WHETHER LOWER URINARY TRACT SYMPTOMS PRESENT: ICD-10-CM

## 2025-01-29 PROCEDURE — 76377 3D RENDER W/INTRP POSTPROCES: CPT

## 2025-01-29 PROCEDURE — 99291 CRITICAL CARE FIRST HOUR: CPT | Performed by: EMERGENCY MEDICINE

## 2025-01-29 PROCEDURE — 70450 CT HEAD/BRAIN W/O DYE: CPT

## 2025-01-29 PROCEDURE — 72125 CT NECK SPINE W/O DYE: CPT

## 2025-01-29 PROCEDURE — G0390 TRAUMA RESPONS W/HOSP CRITI: HCPCS

## 2025-01-29 PROCEDURE — 70486 CT MAXILLOFACIAL W/O DYE: CPT

## 2025-01-29 ASSESSMENT — PAIN SCALES - GENERAL: PAINLEVEL_OUTOF10: 0 - NO PAIN

## 2025-01-29 ASSESSMENT — LIFESTYLE VARIABLES
HAVE YOU EVER FELT YOU SHOULD CUT DOWN ON YOUR DRINKING: NO
EVER FELT BAD OR GUILTY ABOUT YOUR DRINKING: NO
TOTAL SCORE: 0
HAVE PEOPLE ANNOYED YOU BY CRITICIZING YOUR DRINKING: NO
EVER HAD A DRINK FIRST THING IN THE MORNING TO STEADY YOUR NERVES TO GET RID OF A HANGOVER: NO

## 2025-01-29 ASSESSMENT — COLUMBIA-SUICIDE SEVERITY RATING SCALE - C-SSRS
6. HAVE YOU EVER DONE ANYTHING, STARTED TO DO ANYTHING, OR PREPARED TO DO ANYTHING TO END YOUR LIFE?: NO
2. HAVE YOU ACTUALLY HAD ANY THOUGHTS OF KILLING YOURSELF?: NO
1. IN THE PAST MONTH, HAVE YOU WISHED YOU WERE DEAD OR WISHED YOU COULD GO TO SLEEP AND NOT WAKE UP?: NO

## 2025-01-29 ASSESSMENT — PAIN - FUNCTIONAL ASSESSMENT: PAIN_FUNCTIONAL_ASSESSMENT: 0-10

## 2025-01-30 ENCOUNTER — APPOINTMENT (OUTPATIENT)
Dept: CARDIOLOGY | Facility: HOSPITAL | Age: 65
End: 2025-01-30
Payer: OTHER GOVERNMENT

## 2025-01-30 PROBLEM — I95.1 ORTHOSTASIS: Status: ACTIVE | Noted: 2025-01-30

## 2025-01-30 LAB
ALBUMIN SERPL BCP-MCNC: 3.4 G/DL (ref 3.4–5)
ALP SERPL-CCNC: 58 U/L (ref 33–136)
ALT SERPL W P-5'-P-CCNC: 43 U/L (ref 10–52)
ANION GAP SERPL CALC-SCNC: 12 MMOL/L (ref 10–20)
AST SERPL W P-5'-P-CCNC: 21 U/L (ref 9–39)
ATRIAL RATE: 60 BPM
BASOPHILS # BLD AUTO: 0.05 X10*3/UL (ref 0–0.1)
BASOPHILS NFR BLD AUTO: 0.6 %
BILIRUB SERPL-MCNC: 0.4 MG/DL (ref 0–1.2)
BNP SERPL-MCNC: 27 PG/ML (ref 0–99)
BUN SERPL-MCNC: 25 MG/DL (ref 6–23)
CALCIUM SERPL-MCNC: 8.1 MG/DL (ref 8.6–10.3)
CARDIAC TROPONIN I PNL SERPL HS: 6 NG/L (ref 0–20)
CHLORIDE SERPL-SCNC: 109 MMOL/L (ref 98–107)
CO2 SERPL-SCNC: 22 MMOL/L (ref 21–32)
CREAT SERPL-MCNC: 1.46 MG/DL (ref 0.5–1.3)
EGFRCR SERPLBLD CKD-EPI 2021: 53 ML/MIN/1.73M*2
EOSINOPHIL # BLD AUTO: 0.22 X10*3/UL (ref 0–0.7)
EOSINOPHIL NFR BLD AUTO: 2.7 %
ERYTHROCYTE [DISTWIDTH] IN BLOOD BY AUTOMATED COUNT: 14.6 % (ref 11.5–14.5)
GLUCOSE BLD MANUAL STRIP-MCNC: 123 MG/DL (ref 74–99)
GLUCOSE BLD MANUAL STRIP-MCNC: 203 MG/DL (ref 74–99)
GLUCOSE BLD MANUAL STRIP-MCNC: 223 MG/DL (ref 74–99)
GLUCOSE BLD MANUAL STRIP-MCNC: 268 MG/DL (ref 74–99)
GLUCOSE SERPL-MCNC: 169 MG/DL (ref 74–99)
HCT VFR BLD AUTO: 38.1 % (ref 41–52)
HGB BLD-MCNC: 12.3 G/DL (ref 13.5–17.5)
IMM GRANULOCYTES # BLD AUTO: 0.04 X10*3/UL (ref 0–0.7)
IMM GRANULOCYTES NFR BLD AUTO: 0.5 % (ref 0–0.9)
LYMPHOCYTES # BLD AUTO: 1.25 X10*3/UL (ref 1.2–4.8)
LYMPHOCYTES NFR BLD AUTO: 15.2 %
MAGNESIUM SERPL-MCNC: 1.77 MG/DL (ref 1.6–2.4)
MCH RBC QN AUTO: 29.2 PG (ref 26–34)
MCHC RBC AUTO-ENTMCNC: 32.3 G/DL (ref 32–36)
MCV RBC AUTO: 91 FL (ref 80–100)
MONOCYTES # BLD AUTO: 0.48 X10*3/UL (ref 0.1–1)
MONOCYTES NFR BLD AUTO: 5.8 %
NEUTROPHILS # BLD AUTO: 6.21 X10*3/UL (ref 1.2–7.7)
NEUTROPHILS NFR BLD AUTO: 75.2 %
NRBC BLD-RTO: 0 /100 WBCS (ref 0–0)
P AXIS: 61 DEGREES
PLATELET # BLD AUTO: 115 X10*3/UL (ref 150–450)
POTASSIUM SERPL-SCNC: 4.6 MMOL/L (ref 3.5–5.3)
PR INTERVAL: 176 MS
PROT SERPL-MCNC: 5.5 G/DL (ref 6.4–8.2)
Q ONSET: 251 MS
QRS COUNT: 9 BEATS
QRS DURATION: 96 MS
QT INTERVAL: 427 MS
QTC CALCULATION(BAZETT): 427 MS
QTC FREDERICIA: 427 MS
R AXIS: 25 DEGREES
RBC # BLD AUTO: 4.21 X10*6/UL (ref 4.5–5.9)
SODIUM SERPL-SCNC: 138 MMOL/L (ref 136–145)
T AXIS: 30 DEGREES
T OFFSET: 464 MS
VENTRICULAR RATE: 60 BPM
WBC # BLD AUTO: 8.3 X10*3/UL (ref 4.4–11.3)

## 2025-01-30 PROCEDURE — 80053 COMPREHEN METABOLIC PANEL: CPT | Performed by: EMERGENCY MEDICINE

## 2025-01-30 PROCEDURE — G0378 HOSPITAL OBSERVATION PER HR: HCPCS

## 2025-01-30 PROCEDURE — 36415 COLL VENOUS BLD VENIPUNCTURE: CPT | Performed by: EMERGENCY MEDICINE

## 2025-01-30 PROCEDURE — 2500000004 HC RX 250 GENERAL PHARMACY W/ HCPCS (ALT 636 FOR OP/ED): Performed by: EMERGENCY MEDICINE

## 2025-01-30 PROCEDURE — 83735 ASSAY OF MAGNESIUM: CPT | Performed by: EMERGENCY MEDICINE

## 2025-01-30 PROCEDURE — 82947 ASSAY GLUCOSE BLOOD QUANT: CPT

## 2025-01-30 PROCEDURE — 2500000002 HC RX 250 W HCPCS SELF ADMINISTERED DRUGS (ALT 637 FOR MEDICARE OP, ALT 636 FOR OP/ED): Performed by: INTERNAL MEDICINE

## 2025-01-30 PROCEDURE — 96360 HYDRATION IV INFUSION INIT: CPT

## 2025-01-30 PROCEDURE — 96361 HYDRATE IV INFUSION ADD-ON: CPT

## 2025-01-30 PROCEDURE — 85025 COMPLETE CBC W/AUTO DIFF WBC: CPT | Performed by: EMERGENCY MEDICINE

## 2025-01-30 PROCEDURE — 99223 1ST HOSP IP/OBS HIGH 75: CPT | Performed by: INTERNAL MEDICINE

## 2025-01-30 PROCEDURE — 2500000004 HC RX 250 GENERAL PHARMACY W/ HCPCS (ALT 636 FOR OP/ED): Performed by: INTERNAL MEDICINE

## 2025-01-30 PROCEDURE — 99222 1ST HOSP IP/OBS MODERATE 55: CPT | Performed by: INTERNAL MEDICINE

## 2025-01-30 PROCEDURE — 84484 ASSAY OF TROPONIN QUANT: CPT | Performed by: EMERGENCY MEDICINE

## 2025-01-30 PROCEDURE — 93005 ELECTROCARDIOGRAM TRACING: CPT

## 2025-01-30 PROCEDURE — 82947 ASSAY GLUCOSE BLOOD QUANT: CPT | Mod: 59

## 2025-01-30 PROCEDURE — 2500000001 HC RX 250 WO HCPCS SELF ADMINISTERED DRUGS (ALT 637 FOR MEDICARE OP): Performed by: INTERNAL MEDICINE

## 2025-01-30 PROCEDURE — 83880 ASSAY OF NATRIURETIC PEPTIDE: CPT | Performed by: EMERGENCY MEDICINE

## 2025-01-30 RX ORDER — ASPIRIN 81 MG/1
81 TABLET ORAL DAILY
Status: DISCONTINUED | OUTPATIENT
Start: 2025-01-30 | End: 2025-02-02 | Stop reason: HOSPADM

## 2025-01-30 RX ORDER — DEXTROSE 50 % IN WATER (D50W) INTRAVENOUS SYRINGE
12.5
Status: DISCONTINUED | OUTPATIENT
Start: 2025-01-30 | End: 2025-02-02 | Stop reason: HOSPADM

## 2025-01-30 RX ORDER — ACETAMINOPHEN 325 MG/1
650 TABLET ORAL EVERY 4 HOURS PRN
Status: DISCONTINUED | OUTPATIENT
Start: 2025-01-30 | End: 2025-02-02 | Stop reason: HOSPADM

## 2025-01-30 RX ORDER — SODIUM CHLORIDE 9 MG/ML
125 INJECTION, SOLUTION INTRAVENOUS CONTINUOUS
Status: ACTIVE | OUTPATIENT
Start: 2025-01-30 | End: 2025-01-31

## 2025-01-30 RX ORDER — ACETAMINOPHEN 650 MG/1
650 SUPPOSITORY RECTAL EVERY 4 HOURS PRN
Status: DISCONTINUED | OUTPATIENT
Start: 2025-01-30 | End: 2025-02-02 | Stop reason: HOSPADM

## 2025-01-30 RX ORDER — BUPROPION HYDROCHLORIDE 150 MG/1
150 TABLET ORAL EVERY MORNING
Status: DISCONTINUED | OUTPATIENT
Start: 2025-01-30 | End: 2025-02-02 | Stop reason: HOSPADM

## 2025-01-30 RX ORDER — NAPROXEN 375 MG/1
375 TABLET ORAL 2 TIMES DAILY PRN
COMMUNITY

## 2025-01-30 RX ORDER — INSULIN GLARGINE 100 [IU]/ML
29 INJECTION, SOLUTION SUBCUTANEOUS EVERY MORNING
Status: DISCONTINUED | OUTPATIENT
Start: 2025-01-30 | End: 2025-02-02 | Stop reason: HOSPADM

## 2025-01-30 RX ORDER — PANTOPRAZOLE SODIUM 20 MG/1
20 TABLET, DELAYED RELEASE ORAL
Status: DISCONTINUED | OUTPATIENT
Start: 2025-01-30 | End: 2025-02-02 | Stop reason: HOSPADM

## 2025-01-30 RX ORDER — EZETIMIBE 10 MG/1
10 TABLET ORAL NIGHTLY
Status: DISCONTINUED | OUTPATIENT
Start: 2025-01-30 | End: 2025-02-02 | Stop reason: HOSPADM

## 2025-01-30 RX ORDER — DEXTROSE 50 % IN WATER (D50W) INTRAVENOUS SYRINGE
25
Status: DISCONTINUED | OUTPATIENT
Start: 2025-01-30 | End: 2025-02-02 | Stop reason: HOSPADM

## 2025-01-30 RX ORDER — VENLAFAXINE HYDROCHLORIDE 150 MG/1
150 CAPSULE, EXTENDED RELEASE ORAL DAILY
COMMUNITY

## 2025-01-30 RX ORDER — ONDANSETRON HYDROCHLORIDE 2 MG/ML
4 INJECTION, SOLUTION INTRAVENOUS EVERY 8 HOURS PRN
Status: DISCONTINUED | OUTPATIENT
Start: 2025-01-30 | End: 2025-02-02 | Stop reason: HOSPADM

## 2025-01-30 RX ORDER — GUAIFENESIN 600 MG/1
600 TABLET, EXTENDED RELEASE ORAL EVERY 12 HOURS PRN
Status: DISCONTINUED | OUTPATIENT
Start: 2025-01-30 | End: 2025-02-02 | Stop reason: HOSPADM

## 2025-01-30 RX ORDER — ONDANSETRON 4 MG/1
4 TABLET, FILM COATED ORAL EVERY 8 HOURS PRN
Status: DISCONTINUED | OUTPATIENT
Start: 2025-01-30 | End: 2025-02-02 | Stop reason: HOSPADM

## 2025-01-30 RX ORDER — DULOXETIN HYDROCHLORIDE 30 MG/1
60 CAPSULE, DELAYED RELEASE ORAL DAILY
Status: DISCONTINUED | OUTPATIENT
Start: 2025-01-30 | End: 2025-01-31

## 2025-01-30 RX ORDER — PREGABALIN 75 MG/1
150 CAPSULE ORAL 2 TIMES DAILY
Status: DISCONTINUED | OUTPATIENT
Start: 2025-01-30 | End: 2025-02-02 | Stop reason: HOSPADM

## 2025-01-30 RX ORDER — ALUMINUM HYDROXIDE, MAGNESIUM HYDROXIDE, AND SIMETHICONE 1200; 120; 1200 MG/30ML; MG/30ML; MG/30ML
30 SUSPENSION ORAL EVERY 6 HOURS PRN
Status: DISCONTINUED | OUTPATIENT
Start: 2025-01-30 | End: 2025-02-02 | Stop reason: HOSPADM

## 2025-01-30 RX ORDER — ATORVASTATIN CALCIUM 40 MG/1
40 TABLET, FILM COATED ORAL NIGHTLY
Status: DISCONTINUED | OUTPATIENT
Start: 2025-01-30 | End: 2025-02-02 | Stop reason: HOSPADM

## 2025-01-30 RX ORDER — MIDODRINE HYDROCHLORIDE 2.5 MG/1
5 TABLET ORAL
Status: DISCONTINUED | OUTPATIENT
Start: 2025-01-30 | End: 2025-02-02

## 2025-01-30 RX ORDER — POLYETHYLENE GLYCOL 3350 17 G/17G
17 POWDER, FOR SOLUTION ORAL 2 TIMES DAILY PRN
Status: DISCONTINUED | OUTPATIENT
Start: 2025-01-30 | End: 2025-02-02 | Stop reason: HOSPADM

## 2025-01-30 RX ORDER — FLUDROCORTISONE ACETATE 0.1 MG/1
0.1 TABLET ORAL DAILY
Status: DISCONTINUED | OUTPATIENT
Start: 2025-01-30 | End: 2025-01-31

## 2025-01-30 RX ORDER — TALC
3 POWDER (GRAM) TOPICAL NIGHTLY PRN
Status: DISCONTINUED | OUTPATIENT
Start: 2025-01-30 | End: 2025-02-02 | Stop reason: HOSPADM

## 2025-01-30 RX ORDER — INSULIN LISPRO 100 [IU]/ML
0-10 INJECTION, SOLUTION INTRAVENOUS; SUBCUTANEOUS
Status: DISCONTINUED | OUTPATIENT
Start: 2025-01-30 | End: 2025-02-02 | Stop reason: HOSPADM

## 2025-01-30 RX ORDER — ACETAMINOPHEN 160 MG/5ML
650 SUSPENSION ORAL EVERY 4 HOURS PRN
Status: DISCONTINUED | OUTPATIENT
Start: 2025-01-30 | End: 2025-02-02 | Stop reason: HOSPADM

## 2025-01-30 RX ORDER — FERROUS SULFATE 325(65) MG
325 TABLET, DELAYED RELEASE (ENTERIC COATED) ORAL
COMMUNITY

## 2025-01-30 RX ORDER — FINASTERIDE 5 MG/1
5 TABLET, FILM COATED ORAL DAILY
Status: DISCONTINUED | OUTPATIENT
Start: 2025-01-30 | End: 2025-02-02 | Stop reason: HOSPADM

## 2025-01-30 RX ORDER — GUAIFENESIN/DEXTROMETHORPHAN 100-10MG/5
5 SYRUP ORAL EVERY 4 HOURS PRN
Status: DISCONTINUED | OUTPATIENT
Start: 2025-01-30 | End: 2025-02-02 | Stop reason: HOSPADM

## 2025-01-30 RX ADMIN — BUPROPION HYDROCHLORIDE 150 MG: 150 TABLET, EXTENDED RELEASE ORAL at 10:09

## 2025-01-30 RX ADMIN — SODIUM CHLORIDE 125 ML/HR: 9 INJECTION, SOLUTION INTRAVENOUS at 19:52

## 2025-01-30 RX ADMIN — DULOXETINE HYDROCHLORIDE 60 MG: 30 CAPSULE, DELAYED RELEASE ORAL at 10:09

## 2025-01-30 RX ADMIN — ATORVASTATIN CALCIUM 40 MG: 40 TABLET, FILM COATED ORAL at 22:34

## 2025-01-30 RX ADMIN — PANTOPRAZOLE SODIUM 20 MG: 20 TABLET, DELAYED RELEASE ORAL at 06:59

## 2025-01-30 RX ADMIN — INSULIN GLARGINE 29 UNITS: 100 INJECTION, SOLUTION SUBCUTANEOUS at 10:09

## 2025-01-30 RX ADMIN — MIDODRINE HYDROCHLORIDE 5 MG: 2.5 TABLET ORAL at 16:34

## 2025-01-30 RX ADMIN — INSULIN LISPRO 6 UNITS: 100 INJECTION, SOLUTION INTRAVENOUS; SUBCUTANEOUS at 11:39

## 2025-01-30 RX ADMIN — MIDODRINE HYDROCHLORIDE 5 MG: 2.5 TABLET ORAL at 11:47

## 2025-01-30 RX ADMIN — SODIUM CHLORIDE 1000 ML: 9 INJECTION, SOLUTION INTRAVENOUS at 01:42

## 2025-01-30 RX ADMIN — INSULIN LISPRO 4 UNITS: 100 INJECTION, SOLUTION INTRAVENOUS; SUBCUTANEOUS at 18:10

## 2025-01-30 RX ADMIN — EMPAGLIFLOZIN 25 MG: 25 TABLET, FILM COATED ORAL at 10:10

## 2025-01-30 RX ADMIN — SODIUM CHLORIDE 125 ML/HR: 9 INJECTION, SOLUTION INTRAVENOUS at 11:51

## 2025-01-30 RX ADMIN — PREGABALIN 150 MG: 75 CAPSULE ORAL at 10:09

## 2025-01-30 RX ADMIN — MIDODRINE HYDROCHLORIDE 5 MG: 2.5 TABLET ORAL at 06:59

## 2025-01-30 RX ADMIN — FLUDROCORTISONE ACETATE 0.1 MG: 0.1 TABLET ORAL at 15:51

## 2025-01-30 RX ADMIN — SODIUM CHLORIDE 125 ML/HR: 9 INJECTION, SOLUTION INTRAVENOUS at 03:36

## 2025-01-30 RX ADMIN — FINASTERIDE 5 MG: 5 TABLET, FILM COATED ORAL at 10:09

## 2025-01-30 RX ADMIN — PREGABALIN 150 MG: 75 CAPSULE ORAL at 22:34

## 2025-01-30 RX ADMIN — EZETIMIBE 10 MG: 10 TABLET ORAL at 22:34

## 2025-01-30 RX ADMIN — ASPIRIN 81 MG: 81 TABLET, COATED ORAL at 10:09

## 2025-01-30 SDOH — HEALTH STABILITY: MENTAL HEALTH: HOW OFTEN DO YOU HAVE A DRINK CONTAINING ALCOHOL?: NEVER

## 2025-01-30 SDOH — HEALTH STABILITY: MENTAL HEALTH: HOW OFTEN DO YOU HAVE SIX OR MORE DRINKS ON ONE OCCASION?: NEVER

## 2025-01-30 SDOH — ECONOMIC STABILITY: INCOME INSECURITY: IN THE PAST 12 MONTHS HAS THE ELECTRIC, GAS, OIL, OR WATER COMPANY THREATENED TO SHUT OFF SERVICES IN YOUR HOME?: NO

## 2025-01-30 SDOH — SOCIAL STABILITY: SOCIAL INSECURITY: HAVE YOU HAD THOUGHTS OF HARMING ANYONE ELSE?: NO

## 2025-01-30 SDOH — HEALTH STABILITY: MENTAL HEALTH: HOW MANY DRINKS CONTAINING ALCOHOL DO YOU HAVE ON A TYPICAL DAY WHEN YOU ARE DRINKING?: PATIENT DOES NOT DRINK

## 2025-01-30 SDOH — SOCIAL STABILITY: SOCIAL INSECURITY: WERE YOU ABLE TO COMPLETE ALL THE BEHAVIORAL HEALTH SCREENINGS?: YES

## 2025-01-30 SDOH — ECONOMIC STABILITY: FOOD INSECURITY: WITHIN THE PAST 12 MONTHS, THE FOOD YOU BOUGHT JUST DIDN'T LAST AND YOU DIDN'T HAVE MONEY TO GET MORE.: NEVER TRUE

## 2025-01-30 SDOH — SOCIAL STABILITY: SOCIAL INSECURITY: WITHIN THE LAST YEAR, HAVE YOU BEEN HUMILIATED OR EMOTIONALLY ABUSED IN OTHER WAYS BY YOUR PARTNER OR EX-PARTNER?: NO

## 2025-01-30 SDOH — SOCIAL STABILITY: SOCIAL INSECURITY: WITHIN THE LAST YEAR, HAVE YOU BEEN AFRAID OF YOUR PARTNER OR EX-PARTNER?: NO

## 2025-01-30 SDOH — ECONOMIC STABILITY: FOOD INSECURITY: WITHIN THE PAST 12 MONTHS, YOU WORRIED THAT YOUR FOOD WOULD RUN OUT BEFORE YOU GOT THE MONEY TO BUY MORE.: NEVER TRUE

## 2025-01-30 ASSESSMENT — COGNITIVE AND FUNCTIONAL STATUS - GENERAL
MOBILITY SCORE: 21
MOVING TO AND FROM BED TO CHAIR: A LITTLE
TOILETING: A LITTLE
PATIENT BASELINE BEDBOUND: NO
DAILY ACTIVITIY SCORE: 21
HELP NEEDED FOR BATHING: A LITTLE
DAILY ACTIVITIY SCORE: 22
MOBILITY SCORE: 20
CLIMB 3 TO 5 STEPS WITH RAILING: A LITTLE
STANDING UP FROM CHAIR USING ARMS: A LITTLE
WALKING IN HOSPITAL ROOM: A LITTLE
DRESSING REGULAR LOWER BODY CLOTHING: A LITTLE
CLIMB 3 TO 5 STEPS WITH RAILING: A LITTLE
WALKING IN HOSPITAL ROOM: A LITTLE
STANDING UP FROM CHAIR USING ARMS: A LITTLE
HELP NEEDED FOR BATHING: A LITTLE
DRESSING REGULAR LOWER BODY CLOTHING: A LITTLE

## 2025-01-30 ASSESSMENT — ACTIVITIES OF DAILY LIVING (ADL)
ADEQUATE_TO_COMPLETE_ADL: YES
DRESSING YOURSELF: INDEPENDENT
TOILETING: INDEPENDENT
JUDGMENT_ADEQUATE_SAFELY_COMPLETE_DAILY_ACTIVITIES: YES
BATHING: INDEPENDENT
GROOMING: INDEPENDENT
LACK_OF_TRANSPORTATION: NO
HEARING - RIGHT EAR: FUNCTIONAL
HEARING - LEFT EAR: FUNCTIONAL
FEEDING YOURSELF: INDEPENDENT
WALKS IN HOME: NEEDS ASSISTANCE
PATIENT'S MEMORY ADEQUATE TO SAFELY COMPLETE DAILY ACTIVITIES?: YES
LACK_OF_TRANSPORTATION: NO

## 2025-01-30 ASSESSMENT — PAIN - FUNCTIONAL ASSESSMENT
PAIN_FUNCTIONAL_ASSESSMENT: 0-10
PAIN_FUNCTIONAL_ASSESSMENT: 0-10

## 2025-01-30 ASSESSMENT — PAIN SCALES - GENERAL
PAINLEVEL_OUTOF10: 0 - NO PAIN
PAINLEVEL_OUTOF10: 0 - NO PAIN

## 2025-01-30 ASSESSMENT — LIFESTYLE VARIABLES
AUDIT-C TOTAL SCORE: 0
SKIP TO QUESTIONS 9-10: 1
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
## 2025-01-30 NOTE — CONSULTS
"Consults  History Of Present Illness:    Manish Lance is a 64 y.o. male with history of CAD s/p stent, IDDM type II, moderate aortic stenosis, BPH, and orthostatic hypotension presents with orthostasis and falls. Patient was in his normal state of health until yesterday when he started experiencing orthostasis had 2 falls. Endorses lightheadedness and dizziness prior to falls. The first fall he fell to the floor and he suspects he hit his head but is not sure. The second time his wife was able to lower him to the floor. Also had a fall last week tripping over his dog but this was unrelated. During this fall he suffered from of contusion of his left eye. History of orthostatic hypotension and had an admission in October 2024 for this. At that time, was seen by cardiology and amlodipine and lisinopril were discontinued. Started on midodrine. Patient appears to still be on metoprolol and Flomax. In the ED, was orthostatic despite multiple liters of IV fluids. Admitted to medicine.      Last Recorded Vitals:  Vitals:    01/30/25 0304 01/30/25 0340 01/30/25 0513 01/30/25 0630   BP: 76/54 138/84 118/71 124/68   BP Location:    Left arm   Patient Position: Standing   Lying   Pulse: 68  61 60   Resp: 14  14 16   Temp:    36.6 °C (97.9 °F)   TempSrc:    Temporal   SpO2: 97%  96% 96%   Weight:    73 kg (161 lb)   Height:    1.778 m (5' 10\")       Last Labs:  CBC - 1/30/2025:  3:18 AM  8.3 12.3 115    38.1      CMP - 1/30/2025:  3:18 AM  8.1 5.5 21 --- 0.4   _ 3.4 43 58      PTT - No results in last year.  1.0   11.2 _     Troponin I, High Sensitivity   Date/Time Value Ref Range Status   01/30/2025 03:18 AM 6 0 - 20 ng/L Final   10/04/2024 11:19 PM <3 0 - 20 ng/L Final   10/04/2024 10:03 PM 3 0 - 20 ng/L Final     BNP   Date/Time Value Ref Range Status   01/30/2025 03:18 AM 27 0 - 99 pg/mL Final   10/04/2024 10:03 PM 10 0 - 99 pg/mL Final     Hemoglobin A1C   Date/Time Value Ref Range Status   10/28/2023 05:05 AM 10.3 (H) " see below % Final   04/27/2023 05:10 PM 8.4 (A) % Final     Comment:          Diagnosis of Diabetes-Adults   Non-Diabetic: < or = 5.6%   Increased risk for developing diabetes: 5.7-6.4%   Diagnostic of diabetes: > or = 6.5%  .       Monitoring of Diabetes                Age (y)     Therapeutic Goal (%)   Adults:          >18           <7.0   Pediatrics:    13-18           <7.5                   7-12           <8.0                   0- 6            7.5-8.5   American Diabetes Association. Diabetes Care 33(S1), Jan 2010.     02/11/2020 01:15 PM 7.5 % Final     Comment:          Diagnosis of Diabetes-Adults   Non-Diabetic: < or = 5.6%   Increased risk for developing diabetes: 5.7-6.4%   Diagnostic of diabetes: > or = 6.5%  .       Monitoring of Diabetes                Age (y)     Therapeutic Goal (%)   Adults:          >18           <7.0   Pediatrics:    13-18           <7.5                   7-12           <8.0                   0- 6            7.5-8.5   American Diabetes Association. Diabetes Care 33(S1), Jan 2010.       LDL Calculated   Date/Time Value Ref Range Status   10/28/2023 05:05 AM 40 <=99 mg/dL Final     Comment:                                 Near   Borderline      AGE      Desirable  Optimal    High     High     Very High     0-19 Y     0 - 109     ---    110-129   >/= 130     ----    20-24 Y     0 - 119     ---    120-159   >/= 160     ----      >24 Y     0 -  99   100-129  130-159   160-189     >/=190       VLDL   Date/Time Value Ref Range Status   10/28/2023 05:05 AM 15 0 - 40 mg/dL Final   05/26/2022 09:10 AM 18 0 - 40 mg/dL Final   03/08/2021 07:51 AM 32 0 - 40 mg/dL Final   01/18/2021 08:26 AM 53 (H) 0 - 40 mg/dL Final      Last I/O:  I/O last 3 completed shifts:  In: 1000 (13.7 mL/kg) [IV Piggyback:1000]  Out: - (0 mL/kg)   Weight: 73 kg     Past Cardiology Tests (Last 3 Years):  EKG:  ECG 12 Lead       ECG 12 lead 10/04/2024      ECG 12 lead 09/16/2024      ECG 12 lead 08/22/2024      ECG 12  lead 08/19/2024      ECG 12 Lead 12/18/2023    Echo:  Transthoracic Echo (TTE) Complete 10/05/2024      Transthoracic Echo (TTE) Complete 10/28/2023    Ejection Fractions:  EF   Date/Time Value Ref Range Status   10/05/2024 10:57 AM 62 %      Cath:  No results found for this or any previous visit from the past 1095 days.    Stress Test:  No results found for this or any previous visit from the past 1095 days.    Cardiac Imaging:  No results found for this or any previous visit from the past 1095 days.      Past Medical History:  He has a past medical history of Diabetes mellitus (Multi), Personal history of other diseases of the circulatory system (10/16/2020), and Personal history of other diseases of the circulatory system (10/16/2020).    Past Surgical History:  He has a past surgical history that includes Coronary angioplasty (07/14/2017); Gastric bypass (07/14/2017); Back surgery (07/14/2017); and Carpal tunnel release (07/14/2017).      Social History:  He reports that he quit smoking about 30 years ago. His smoking use included cigarettes. He has quit using smokeless tobacco. He reports that he does not currently use alcohol. He reports that he does not use drugs.    Family History:  Family History   Problem Relation Name Age of Onset    Coronary artery disease Mother      Coronary artery disease Father      Coronary artery disease Brother          Allergies:  Patient has no known allergies.    Inpatient Medications:  Scheduled medications   Medication Dose Route Frequency    aspirin  81 mg oral Daily    atorvastatin  40 mg oral Nightly    buPROPion XL  150 mg oral q AM    DULoxetine  60 mg oral Daily    empagliflozin  25 mg oral Daily    ezetimibe  10 mg oral Nightly    finasteride  5 mg oral Daily    fludrocortisone  0.1 mg oral Daily    insulin glargine  29 Units subcutaneous q AM    insulin lispro  0-10 Units subcutaneous TID AC    midodrine  5 mg oral TID    pantoprazole  20 mg oral Daily before  breakfast    pregabalin  150 mg oral BID     PRN medications   Medication    acetaminophen    Or    acetaminophen    Or    acetaminophen    alum-mag hydroxide-simeth    benzocaine-menthol    dextromethorphan-guaifenesin    dextrose    dextrose    glucagon    glucagon    guaiFENesin    melatonin    ondansetron    Or    ondansetron    polyethylene glycol     Continuous Medications   Medication Dose Last Rate    sodium chloride 0.9%  125 mL/hr 125 mL/hr (01/30/25 8870)     Outpatient Medications:  Current Outpatient Medications   Medication Instructions    acetaminophen (TYLENOL) 325 mg, Every 6 hours PRN    albuterol (ProAir HFA) 90 mcg/actuation inhaler INHALE 1-2 PUFFS QID PRN SHORTNESS OR BREATH/WHEEZING    allopurinol (ZYLOPRIM) 300 mg, Daily    aspirin-acetaminophen-caffeine (Excedrin Migraine) 250-250-65 mg tablet 1 tablet, As needed    aspirin 81 mg, Daily    atorvastatin (LIPITOR) 40 mg, Nightly    buPROPion XL (WELLBUTRIN XL) 150 mg, Every morning    cholecalciferol (Vitamin D-3) 50 MCG (2000 UT) tablet 1 tablet, Daily    DULoxetine (CYMBALTA) 60 mg, Daily    empagliflozin (JARDIANCE) 25 mg, Daily    ezetimibe (Zetia) 10 mg tablet 1 tablet, Daily    fluticasone (Flonase) 50 mcg/actuation nasal spray 2 sprays, Nightly    insulin glargine (LANTUS U-100 INSULIN) 29 Units, Every morning    metFORMIN (Glucophage) 1,000 mg tablet 1 tablet, Every 12 hours    metoprolol tartrate (LOPRESSOR) 12.5 mg, oral, 2 times daily    midodrine (PROAMATINE) 2.5 mg, oral, 3 times daily (morning, midday, late afternoon)    nitroglycerin (NITROSTAT) 0.4 mg, Every 5 min PRN    omeprazole (PriLOSEC) 20 mg DR capsule 1 capsule, 2 times daily    pregabalin (Lyrica) 150 mg capsule 1 capsule, 2 times daily    tamsulosin (Flomax) 0.4 mg 24 hr capsule 1 capsule, Nightly    ticagrelor (BRILINTA) 60 mg, 2 times daily       Physical Exam:  General: Ill appearing, cooperative with exam, in NAD.  HEENT: Contusion of left eye.  Lymph: No cervical  or inguinal lymphadenopathy.  Cardiac: RRR.  Harsh holosystolic murmur.  Lungs: CTAB. Nl WOB.  Abd: Non-tender. No rebound or gaurding. Nl bowel sounds.  Ext: No edema. 2+ pulses.  Skin: No rashes, abrasions, or contusions.  Psych: A&Ox3. Nl affect.  Neuro: 5/5 strength. Sensation intact.     Assessment/Plan   1) recurrent falls due to orthostatic hypotension  discontinue Metoprolol  Agree with discontinuing Flomax  Start Florinef  Increase PO fluids    2) Unexplained weight loss  Needs GI workup for malabsorption syndrome    3) CAD s/p PCI in past  Stable         Code Status:  Full Code    I spent 30 minutes in the professional and overall care of this patient.        Alexis Castillo MD

## 2025-01-30 NOTE — PROGRESS NOTES
"Internal Medicine Progress Note    Name: Manish Lance  : 1960  Chief Complaint: Fall  Primary Care Physician: GENERIC EXTERNAL DATA PROVIDER  Admission date: 2025  Date of service: 2025     History of Present Illness  2025: Patient seen laying back in his bed in his room. He is awake and alert, in no acute respiratory distress on room air. Wife is at the bedside. States that he is feeling improved today, denies any further episodes of lightheadedness today. States that his lightheadedness typically only ever occurs shortly after standing. He denies any palpitations associated with his episodes of syncope. Saw cardiology this morning, started on florinef, discontinued metoprolol. Recommending GI consult for weight loss. Patient otherwise states he is feeling well this morning, denies other complaints or concerns     Allergies  No Known Allergies    Review of Systems:   12 point ROS reviewed and negative other than noted above    Objective  VITALS:  /76 (BP Location: Left arm, Patient Position: Lying)   Pulse 83   Temp 36.6 °C (97.8 °F) (Temporal)   Resp 17   Ht 1.778 m (5' 10\")   Wt 73 kg (161 lb)   SpO2 95%   BMI 23.10 kg/m²     Physical Exam:   Constitutional: Well developed, well nourished, in no acute distress. Laying back in bed comfortably and talkative  Eyes: PERRL, EOMI  Head/Neck: Normocephalic, atraumatic. Neck supple, no thyromegaly, JVD. Trachea midline  Respiratory/Thorax: Normal respiratory effort. Lungs CTAB with no wheezing, rales, or rhonchi noted  Cardiovascular: RRR, murmur noted, no or gallops noted. Peripheral pulses 2+  Gastrointestinal: Bowel sounds normal. Abdomen soft, nontender, nondistended, with no palpable masses  Musculoskeletal: No gross deformities. No gross muscular weakness with no ROM limitation  Extremities: No lower extremity edema noted bilaterally  Neurological: Alert and oriented x3, CN II - VII grossly intact  Psychological: Appropriate " mood and affect  Skin: No rashes or lesions noted.    Labs-   Lab Results   Component Value Date    WBC 8.3 01/30/2025    HGB 12.3 (L) 01/30/2025    HCT 38.1 (L) 01/30/2025     (L) 01/30/2025     01/30/2025    K 4.6 01/30/2025     (H) 01/30/2025    CREATININE 1.46 (H) 01/30/2025    BUN 25 (H) 01/30/2025    CO2 22 01/30/2025    GLUCOSE 169 (H) 01/30/2025    ALT 43 01/30/2025    AST 21 01/30/2025    INR 1.0 09/16/2024       Lab Results   Component Value Date    IRON 54 10/12/2022    FERRITIN 275 10/12/2022    TIBC 295 10/12/2022       Lab Results   Component Value Date    TROPHS 6 01/30/2025    TROPHS <3 10/04/2024    TROPHS 3 10/04/2024        Lab Results   Component Value Date    TSH 1.56 10/28/2023    VITD25 43 10/12/2022       Lab Results   Component Value Date    MG 1.77 01/30/2025    PHOS 4.5 10/28/2023         Transthoracic Echo (TTE) Complete 10/05/2024     Results for orders placed during the hospital encounter of 10/04/24    Transthoracic Echo ([place]E) [place] [address]  Phone [phone] Fax [phone]    TRANSTHORACIC ECHOCARDIOGRAM REPORT    Patient Name:      EBONY Rodriguez Physician:    [id number] Alexis Castillo MD  Study Date:        10/5/2024           Ordering Provider:    [id number] ARGENIS SANTIAGO  MRN/PID:           [id number]            Fellow:  Accession#:        [id number]        Nurse:  Date of Birth/Age: 1960 / 64      Sonographer:          Rhoda Jones RDCS  years  Gender:            M                   Additional Staff:  Height:            177.80 cm           Admit Date:           10/4/2024  Weight:            72.58 kg            Admission Status:     Inpatient - Routine  BSA / BMI:         1.90 m2 / 22.96     Department Location:  [address]  kg/m2  Blood Pressure: 118 /84 mmHg    Study Type:    TRANSTHORACIC ECHO (TTE) COMPLETE  Diagnosis/ICD: Other forms of dyspnea-R06.09  Indication:     Dyspnea on Exertion  CPT Codes:     Echo Complete w Full [id number]    Patient History:  Pertinent History: HTN, CAD, Hyperlipidemia and CHF. Aortic Stenosis.    Study Detail: The following Echo studies were performed: 2D, M-Mode, Doppler and  color flow. A bubble study was not performed.      PHYSICIAN INTERPRETATION:  Left Ventricle: The left ventricular systolic function is normal, with a Nickerson's biplane calculated ejection fraction of 62%. There are no regional wall motion abnormalities. The left ventricular cavity size is normal. Spectral Doppler shows a normal pattern of left ventricular diastolic filling.  Left Atrium: The left atrium is normal in size. A bubble study using agitated saline was not performed.  Right Ventricle: The right ventricle is normal in size. There is normal right ventricular global systolic function.  Right Atrium: The right atrium is normal in size.  Aortic Valve: The aortic valve is trileaflet. There is mild to moderate aortic valve thickening. There is evidence of moderate aortic valve stenosis.  The aortic valve dimensionless index is 0.35. There is mild aortic valve regurgitation. The peak instantaneous gradient of the aortic valve is 31.8 mmHg. The mean gradient of the aortic valve is 17.0 mmHg.  Mitral Valve: The mitral valve is normal in structure. There is trace mitral valve regurgitation.  Tricuspid Valve: The tricuspid valve is structurally normal. There is trace tricuspid regurgitation.  Pulmonic Valve: The pulmonic valve is structurally normal. There is physiologic pulmonic valve regurgitation.  Pericardium: No pericardial effusion noted. There is a pericardial fat pad present.  Aorta: The aortic root is normal.      CONCLUSIONS:  1. The left ventricular systolic function is normal, with a Nickerson's biplane calculated ejection fraction of 62%.  2. There is normal right ventricular global systolic function.  3. Moderate aortic valve stenosis.  4. Mild aortic valve  regurgitation.    QUANTITATIVE DATA SUMMARY:    2D MEASUREMENTS:           Normal Ranges:  Ao Root d:       3.10 cm   (2.0-3.7cm)  LAs:             4.00 cm   (2.7-4.0cm)  IVSd:            0.70 cm   (0.6-1.1cm)  LVPWd:           0.80 cm   (0.6-1.1cm)  LVIDd:           5.00 cm   (3.9-5.9cm)  LVIDs:           2.50 cm  LV Mass Index:   65.9 g/m2  LV % FS          50.0 %      LA VOLUME:                    Normal Ranges:  LA Vol A4C:        53.5 ml    (22+/-6mL/m2)  LA Vol A2C:        55.0 ml  LA Vol BP:         54.7 ml  LA Vol Index A4C:  28.2ml/m2  LA Vol Index A2C:  29.0 ml/m2  LA Vol Index BP:   28.8 ml/m2  LA Area A4C:       18.6 cm2  LA Area A2C:       18.7 cm2  LA Major Axis A4C: 5.5 cm  LA Major Axis A2C: 5.4 cm  LA Volume Index:   28.8 ml/m2      RA VOLUME BY A/L METHOD:          Normal Ranges:  RA Area A4C:             16.3 cm2      AORTA MEASUREMENTS:         Normal Ranges:  Ao Sinus, d:        3.20 cm (2.1-3.5cm)  Ao STJ, d:          1.90 cm (1.7-3.4cm)  Asc Ao, d:          2.90 cm (2.1-3.4cm)      LV SYSTOLIC FUNCTION BY 2D PLANIMETRY (MOD):  Normal Ranges:  EF-A4C View:    61 % (>=55%)  EF-A2C View:    61 %  EF-Biplane:     62 %  LV EF Reported: 62 %      LV DIASTOLIC FUNCTION:           Normal Ranges:  MV Peak E:             0.98 m/s  (0.7-1.2 m/s)  MV Peak A:             0.84 m/s  (0.42-0.7 m/s)  E/A Ratio:             1.17      (1.0-2.2)  MV e'                  0.114 m/s (>8.0)  MV lateral e'          0.13 m/s  MV medial e'           0.10 m/s  E/e' Ratio:            8.68      (<8.0)      AORTIC VALVE:                      Normal Ranges:  AoV Vmax:                2.82 m/s  (<=1.7m/s)  AoV Peak P.8 mmHg (<20mmHg)  AoV Mean P.0 mmHg (1.7-11.5mmHg)  LVOT Max Trae:            1.05 m/s  (<=1.1m/s)  AoV VTI:                 65.10 cm  (18-25cm)  LVOT VTI:                22.70 cm  LVOT Diameter:           1.90 cm   (1.8-2.4cm)  AoV Area, VTI:           0.99 cm2  (2.5-5.5cm2)  AoV  Area,Vmax:           1.06 cm2  (2.5-4.5cm2)  AoV Dimensionless Index: 0.35      AORTIC INSUFFICIENCY:  AI Vmax:       3.86 m/s  AI Half-time:  601 msec  AI Decel Rate: 188.00 cm/s2      RIGHT VENTRICLE:  RV Basal 3.80 cm  RV Mid   3.10 cm  RV Major 7.3 cm  TAPSE:   26.1 mm  RV s'    0.13 m/s      [id number] Alexis Castillo MD  Electronically signed on 10/5/2024 at 1:38:18 PM        ** Final **         Recent Radiological Studies:  CT maxillofacial bones wo IV contrast   Final Result   CT HEAD:   1. No acute intracranial abnormality or calvarial fracture.             CT MAXILLOFACIAL SKELETON:   1. No acute facial bone fracture.   2. Mild soft tissue swelling/contusion within the left face, no   sizable hematoma appreciated.   3. Plate and screw fixation hardware within the anterior right   mandible. The hardware is not flush with the mandible and is back to   a slightly. This is of indeterminate chronicity.             CT CERVICAL SPINE:   1. No acute fracture or traumatic malalignment of the cervical spine.        MACRO:   None             Signed by: Ronald Zambrano 1/29/2025 11:41 PM   Dictation workstation:   [id number]      CT 3D reconstruction   Final Result   CT HEAD:   1. No acute intracranial abnormality or calvarial fracture.             CT MAXILLOFACIAL SKELETON:   1. No acute facial bone fracture.   2. Mild soft tissue swelling/contusion within the left face, no   sizable hematoma appreciated.   3. Plate and screw fixation hardware within the anterior right   mandible. The hardware is not flush with the mandible and is back to   a slightly. This is of indeterminate chronicity.             CT CERVICAL SPINE:   1. No acute fracture or traumatic malalignment of the cervical spine.        MACRO:   None             Signed by: Ronald Zambrano 1/29/2025 11:41 PM   Dictation workstation:   [id number]      CT cervical spine wo IV contrast   Final Result   CT HEAD:   1. No acute intracranial abnormality or calvarial  fracture.             CT MAXILLOFACIAL SKELETON:   1. No acute facial bone fracture.   2. Mild soft tissue swelling/contusion within the left face, no   sizable hematoma appreciated.   3. Plate and screw fixation hardware within the anterior right   mandible. The hardware is not flush with the mandible and is back to   a slightly. This is of indeterminate chronicity.             CT CERVICAL SPINE:   1. No acute fracture or traumatic malalignment of the cervical spine.        MACRO:   None             Signed by: Ronald Zambrano 1/29/2025 11:41 PM   Dictation workstation:   [id number]      CT head W O contrast trauma protocol   Final Result   CT HEAD:   1. No acute intracranial abnormality or calvarial fracture.             CT MAXILLOFACIAL SKELETON:   1. No acute facial bone fracture.   2. Mild soft tissue swelling/contusion within the left face, no   sizable hematoma appreciated.   3. Plate and screw fixation hardware within the anterior right   mandible. The hardware is not flush with the mandible and is back to   a slightly. This is of indeterminate chronicity.             CT CERVICAL SPINE:   1. No acute fracture or traumatic malalignment of the cervical spine.        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  Will be referring to Ortho.  Will continue the NPH at 100 units bid and reduce the regular to 100 units with breakfast and [place] and supper to 85 units to see if we can stop the night time hypoglycemia.     Type 2 Diabetes Mellitus With Diabetic Neuropathy, With Long-Term Current Use of Insulin   Hyperlipidemia   Nonrheumatic Aortic Valve Stenosis        Plan  Orthostatic Hypotension  Last adm, amlodipine and lisinopril were stopped, started on midodrine 2.5mg TID  Still on metoprolol and  Flomax -- discontinued here  Start finasteride in place of Flomax  Increased midodrine to 5 mg 3 times daily  Cardiology consultation -- started on florinef  PT/OT     Moderate Aortic Stenosis  This can cause syncope but more likely due to medications above     Acute Renal Failure  Suspect prerenal in setting of pathology above  On IVF, will continue to trend  Consider nephrology consult if not improving    Unexplained weight loss  Seen by cardiology this AM, recommending GI consult for possible malabsorption issues  Consult to GI, appreciate recommendations     Concussion  Wife says that memory is poor since recent falls     Other Issues  CAD s/p stent: Home meds  IDDM type II: MDSS + home Lantus  BPH: Exchanged Flomax for finasteride     DVT Prophy  SCDs    Code Status: Full Code     Quinn Romero PA-C   1/30/2025  10:45 AM

## 2025-01-30 NOTE — PROGRESS NOTES
Emergency Medicine Transition of Care Note.    I received Manish Lance in signout from Dr. Nguyen.  Please see the previous ED provider note for all HPI, PE and MDM up to the time of signout at 0100. This is in addition to the primary record.    In brief Manish Lance is an 64 y.o. male presenting for   Chief Complaint   Patient presents with    Fall     At the time of signout we were awaiting: IV fluids and discharge    ED Course as of 01/30/25 0716   Wed Jan 29, 2025   2245 Patient presents as a limited trauma after fall on thinner. Available chart reviewed. On initial assessment the patient was found non-toxic, no acute distress, vitals hemodynamically stable and afebrile. Initial concern for fracture, subluxation, internal bleeding, dehydration and orthostasis.  Liter fluids were continued here. [JH]   2346 CT imaging of the head, max face and C-spine shows no fractures.  Patient has a contusion to the left max face. []   Thu Jan 30, 2025   0041 Reviewed vital signs are not orthostatic but does show significant drop in systolic blood pressures.  Patient denies taking more of his medications.  Wife states patient is likely dehydrated.  Patient did receive a liter of fluids here.  Offered admission however patient declined.  Will discharge home with close outpatient follow-up.    No indication for admission.  Discussed findings and diagnosis with the patient, follow-up and return to ED precautions given, patient voiced understanding, agrees with plan, questions answered, patient was discharged in stable condition. [JH]   0340 EKG performed at 0337 and interpreted by me shows sinus rhythm at a rate of 60.  Intervals are normal.  The axis is normal.  There are no ST or T wave changes.  No STEMI. [SP]      ED Course User Index  [JH] Armando Nguyen MD  [SP] Beverley Powell DO         Diagnoses as of 01/30/25 0716   Fall, initial encounter   Closed head injury, initial encounter   Contusion of face, initial encounter    Dehydration   Syncope, unspecified syncope type   Orthostatic hypotension       Medical Decision Making  After second liter of IV fluids the patient continues to be hypotensive upon standing with systolic pressure in the 70s.  He does report compliance with his medications including midodrine.  Given the patient's ongoing symptoms labs, EKG were added to his workup.    CBC shows mild anemia with hemoglobin of 12.3 and platelets of 115.  CMP shows abnormal renal function with creatinine of 1.4 from prior just under 1.  Otherwise normal.  BNP, troponin, magnesium are normal.    Despite multiple liters of fluid the patient continues to be hypotensive to the 70s when standing.  He is unsteady when standing.  As such I do recommend admission.  I contacted Dr. Peters, hospitalist who agrees to admit the patient for further care.    Final diagnoses:   [W19.XXXA] Fall, initial encounter   [S09.90XA] Closed head injury, initial encounter   [S00.83XA] Contusion of face, initial encounter   [E86.0] Dehydration           Procedure  Procedures    Beverley Powell DO

## 2025-01-30 NOTE — ED PROVIDER NOTES
HPI   Chief Complaint   Patient presents with    Fall       Patient presents emergency department after multiple syncopal episodes and multiple falls striking his head while on Brilinta.  Patient has a complex cardiac history with stents which is why he is on Brilinta.  Patient's had workups and has seen cardiology for the syncopal events.  They thought this was secondary to his medications and been adjusting his blood pressure medications.  Patient was found to reportedly be orthostatic by EMS so was given a liter of fluids during transport here in the emergency department.  Patient denies any neck pain or headaches.                          Sudhir Coma Scale Score: 15                  Patient History   Past Medical History:   Diagnosis Date    Diabetes mellitus (Multi)     Personal history of other diseases of the circulatory system 10/16/2020    History of heart disease    Personal history of other diseases of the circulatory system 10/16/2020    History of hypertension     Past Surgical History:   Procedure Laterality Date    BACK SURGERY  2017    Back Surgery    CARPAL TUNNEL RELEASE  2017    Neuroplasty Median Nerve At Carpal Tunnel    CORONARY ANGIOPLASTY  2017    PTCA    GASTRIC BYPASS  2017    Gastric Surgery For Morbid Obesity Gastric Bypass     Family History   Problem Relation Name Age of Onset    Coronary artery disease Mother      Coronary artery disease Father      Coronary artery disease Brother       Social History     Tobacco Use    Smoking status: Former     Current packs/day: 0.00     Types: Cigarettes     Quit date:      Years since quittin.0    Smokeless tobacco: Former   Vaping Use    Vaping status: Never Used   Substance Use Topics    Alcohol use: Not Currently    Drug use: Never       Physical Exam   ED Triage Vitals [25 2243]   Temperature Heart Rate Respirations BP   36 °C (96.8 °F) 66 14 107/69      Pulse Ox Temp src Heart Rate Source Patient  Position   99 % -- -- --      BP Location FiO2 (%)     -- --       PRIMARY SURVEY  Airway: Patent  Breathing: Breath sounds equal  Circulation: 2+ radial, 2+ femoral, 2+ dp/tp  Disability:     Eye: 4     Verbal: 5     Motor: 6    SECONDARY SURVEY  Neurological: Sensation and motor grossly intact in upper and lower extremities, oriented x3  HEENT: Mild ecchymoses to the left maxilla, no occlusions. PERRLA, no step offs.  Neck: No midline cervical tenderness, trachea midline, atraumatic  Chest: Atraumatic, nontender  Cardiovascular: Regular rate and rhythm  Abdomen: Atraumatic, nontender  Pelvic/Perineal: Atraumatic, pelvis stable, no blood present  Back/Spine: Atraumatic, nontender, no step offs  Extremities: Atraumatic, nontender, no deformity    Physical Exam    ED Course & [facility]   ED Course as of 01/30/25 0043   Wed Jan 29, 2025   2245 Patient presents as a limited trauma after fall on thinner. Available chart reviewed. On initial assessment the patient was found non-toxic, no acute distress, vitals hemodynamically stable and afebrile. Initial concern for fracture, subluxation, internal bleeding, dehydration and orthostasis.  Liter fluids were continued here. [JH]   2346 CT imaging of the head, max face and C-spine shows no fractures.  Patient has a contusion to the left max face. []   Thu Jan 30, 2025   0041 Reviewed vital signs are not orthostatic but does show significant drop in systolic blood pressures.  Patient denies taking more of his medications.  Wife states patient is likely dehydrated.  Patient did receive a liter of fluids here.  Offered admission however patient declined.  Will discharge home with close outpatient follow-up.    No indication for admission.  Discussed findings and diagnosis with the patient, follow-up and return to ED precautions given, patient voiced understanding, agrees with plan, questions answered, patient was discharged in stable condition. [JH]      ED Course User Index  [JH]  Armando Nguyen MD         Diagnoses as of 01/30/25 0043   Fall, initial encounter   Closed head injury, initial encounter   Contusion of face, initial encounter   Dehydration       Medical Decision Making      Procedure  Critical Care    Performed by: Armando Nguyen MD  Authorized by: Armando Nguyen MD    Critical care provider statement:     Critical care time (minutes):  30    Critical care time was exclusive of:  Separately billable procedures and treating other patients and teaching time    Critical care was necessary to treat or prevent imminent or life-threatening deterioration of the following conditions:  Trauma    Critical care was time spent personally by me on the following activities:  Discussions with consultants, obtaining history from patient or surrogate, re-evaluation of patient's condition, ordering and review of radiographic studies and examination of patient       Armando Nguyen MD  01/30/25 0044

## 2025-01-30 NOTE — H&P
[facility]    Hospital Medicine History & Physical     Assessment & Plan     ASSESSMENT    64M with history of CAD s/p stent, IDDM type II, moderate aortic stenosis, BPH, and orthostatic hypotension presents with orthostasis and falls.     PLAN    Orthostatic Hypotension  -Last adm, amlodipine and lisinopril were stopped, started on midodrine 2.5 3 tid  -Still be on metoprolol and Flomax  -Will stop metoprolol and exchange Flomax for finasteride  -Increase midodrine to 5 mg 3 times daily  -Cardiology consultation    Moderate Aortic Stenosis  -This can cause syncope but more likely due to medications above    Acute Renal Failure  -Suspect prerenal in setting of pathology above  -S/p IVF in the ED, trend    Concussion  -Wife says that memory is poor since recent falls    Other Issues  -CAD s/p stent: Home meds  -IDDM type II: MDSS + home Lantus  -BPH: Exchanged Flomax for finasteride    DVT Prophy  -SCDs    Disposition  -Med/tele      Danie Peters MD    History of Present Illness     Manish Lance is a 64 y.o. with history of CAD s/p stent, IDDM type II, moderate aortic stenosis, BPH, and orthostatic hypotension presents with orthostasis and falls.  Patient was in his normal state of health until yesterday when he started experiencing orthostasis had 2 falls.  Endorses lightheadedness and dizziness prior to falls.  The first fall he fell to the floor and he suspects he hit his head but is not sure.  The second time his wife was able to lower him to the floor.  Also had a fall last week tripping over his dog but this was unrelated.  During this fall he suffered from of contusion of his left eye.  History of orthostatic hypotension and had an admission in October 2024 for this.  At that time, was seen by cardiology and amlodipine and lisinopril were discontinued.  Started on midodrine.  Patient appears to still be on metoprolol and Flomax.  In the ED, was orthostatic despite  multiple liters of IV fluids.  Admitted to medicine.    Review of Systems     A Comprehensive greater than 10 system review of systems was carried out.  Pertinent positives and negatives are noted above.  Otherwise negative for contributory information.     Past Medical History     Past Medical History:   Diagnosis Date    Diabetes mellitus (Multi)     Personal history of other diseases of the circulatory system 10/16/2020    History of heart disease    Personal history of other diseases of the circulatory system 10/16/2020    History of hypertension     Medications     No current facility-administered medications on file prior to encounter.     Current Outpatient Medications on File Prior to Encounter   Medication Sig Dispense Refill    acetaminophen (TylenoL) 325 mg tablet Take 1 tablet (325 mg) by mouth every 6 hours if needed.      albuterol (ProAir HFA) 90 mcg/actuation inhaler INHALE 1-2 PUFFS QID PRN SHORTNESS OR BREATH/WHEEZING      allopurinol (Zyloprim) 300 mg tablet Take 1 tablet (300 mg) by mouth once daily.      aspirin 81 mg EC tablet Take 1 tablet (81 mg) by mouth once daily.      aspirin-acetaminophen-caffeine (Excedrin Migraine) 250-250-65 mg tablet Take 1 tablet by mouth if needed for headaches.      atorvastatin (Lipitor) 40 mg tablet Take 1 tablet (40 mg) by mouth once daily at bedtime.      buPROPion XL (Wellbutrin XL) 150 mg 24 hr tablet Take 1 tablet (150 mg) by mouth once daily in the morning. Do not crush, chew, or split.      cholecalciferol (Vitamin D-3) 50 MCG (2000 UT) tablet Take 1 tablet (2,000 Units) by mouth once daily. CLARIFY DIRECTIONS      DULoxetine (Cymbalta) 60 mg DR capsule Take 1 capsule (60 mg) by mouth once daily.      empagliflozin (Jardiance) 25 mg Take 1 tablet (25 mg) by mouth once daily.      ezetimibe (Zetia) 10 mg tablet Take 1 tablet (10 mg) by mouth once daily.      fluticasone (Flonase) 50 mcg/actuation nasal spray Administer 2 sprays into each nostril once daily  "at bedtime. CLARIFY DIRECTIONS      insulin glargine (Lantus U-100 Insulin) 100 unit/mL injection Inject 29 Units under the skin once daily in the morning.      metFORMIN (Glucophage) 1,000 mg tablet Take 1 tablet (1,000 mg) by mouth every 12 hours.      metoprolol tartrate (Lopressor) 25 mg tablet Take 0.5 tablets (12.5 mg) by mouth 2 times a day.      midodrine (Proamatine) 2.5 mg tablet Take 1 tablet (2.5 mg) by mouth 3 times daily (morning, midday, late afternoon). 90 tablet 0    nitroglycerin (Nitrostat) 0.4 mg SL tablet Place 1 tablet (0.4 mg) under the tongue every 5 minutes if needed for chest pain (MAX THREE TABS PER EPISODE). PLACE 1 TABLET UNDER THE TONGUE EVERY 5 MINUTES FOR UP TO 3 DOSES AS NEEDED FOR CHEST PAIN.CALL 911 IF PAIN PERSISTS.      omeprazole (PriLOSEC) 20 mg DR capsule Take 1 capsule (20 mg) by mouth twice a day.      pregabalin (Lyrica) 150 mg capsule Take 1 capsule (150 mg) by mouth 2 times a day.      tamsulosin (Flomax) 0.4 mg 24 hr capsule Take 1 capsule (0.4 mg) by mouth once daily at bedtime.        Past Surgical History     Past Surgical History:   Procedure Laterality Date    BACK SURGERY  2017    Back Surgery    CARPAL TUNNEL RELEASE  2017    Neuroplasty Median Nerve At Carpal Tunnel    CORONARY ANGIOPLASTY  2017    PTCA    GASTRIC BYPASS  2017    Gastric Surgery For Morbid Obesity Gastric Bypass     Family History   Reviewed and non-contributory to presenting complaints    Allergies   No Known Allergies    Social History     Social History     Tobacco Use    Smoking status: Former     Current packs/day: 0.00     Types: Cigarettes     Quit date:      Years since quittin.1    Smokeless tobacco: Former   Substance Use Topics    Alcohol use: Not Currently     Physical Exam   Blood pressure 138/84, pulse 68, temperature 36 °C (96.8 °F), resp. rate 14, height 1.778 m (5' 10\"), weight 70.3 kg (155 lb), SpO2 97%.    General: Ill appearing, cooperative " with exam, in NAD.  HEENT: Contusion of left eye.  Lymph: No cervical or inguinal lymphadenopathy.  Cardiac: RRR.  Harsh holosystolic murmur.  Lungs: CTAB. Nl WOB.  Abd: Non-tender. No rebound or gaurding. Nl bowel sounds.  Ext: No edema. 2+ pulses.  Skin: No rashes, abrasions, or contusions.  Psych: A&Ox3. Nl affect.  Neuro: 5/5 strength. Sensation intact.    Labs & Imaging     Reviewed and Pertinent results discussed in assessment and plan.

## 2025-01-30 NOTE — PROGRESS NOTES
"   01/30/25 1015   Discharge Planning   Living Arrangements Spouse/significant other   Support Systems Spouse/significant other   Assistance Needed independent in bathing, dressing,  does some cooking, cleaning. Shares in driving. Fall before admission. No use of assistive devices.   Type of Residence Private residence   Number of Stairs to Enter Residence 2   Number of Stairs Within Residence 14   Do you have animals or pets at home? Yes   Type of Animals or Pets couple dogs, couple cats.   Who is requesting discharge planning? Patient   Home or Post Acute Services None   Expected Discharge Disposition Home   Financial Resource Strain   How hard is it for you to pay for the very basics like food, housing, medical care, and heating? Not hard   Housing Stability   In the past 12 months, how many times have you moved where you were living? 0   At any time in the past 12 months, were you homeless or living in a shelter (including now)? N   Transportation Needs   In the past 12 months, has lack of transportation kept you from medical appointments or from getting medications? no   In the past 12 months, has lack of transportation kept you from meetings, work, or from getting things needed for daily living? No   Stroke Family Assessment   Stroke Family Assessment Needed No   Intensity of Service   Intensity of Service 0-30 min     Spoke with pt, role of TCC explained, demographics confirmed. [facility] [place] [facility]. Due to see soon but unsure of date. Pharmacy- [facility] in [place]- takes meds as prescribed, able to afford and obtain. Is diabetic- checking sugar once a day and as needed, takes insulin. Independent in care, did fall 2x last night, pt here on observation for orthostasis. Pt reports that the falls are likely to low blood pressure and meds are being changed. Pt is considering getting a cane or walker for, \"When I am tired.\" But [facility] to have [facility] if needed. Plan- " Home no needs. CT will follow.

## 2025-01-30 NOTE — PROGRESS NOTES
Manish Lance is a 64 y.o. male admitted for Orthostasis. Pharmacy reviewed the patient's [id number] medications and allergies for accuracy.    The list below reflects the PTA list prior to pharmacy medication history. A summary a changes to the PTA medication list has been listed below. Please review each medication in order reconciliation for additional clarification and justification.    [facility] of information:  [facility]    Medications added:  Venlafaxine xr 150mg every day    Naproxen 375mg bid prn  Ferrous sulfate 325mg tid    Medications modified:  Lantus 100 units/ml UAD --> 29 units qam  Metoprolol Tart 25mg 0.5t bid --> 25mg bid  Pregabalin 150mg bid --> 300mg bid  Ticagrelor 60 mg Bid---> 90 mg BID  Medications to be removed:  Apap 325mg  Duloxetine 60mg    Medications of concern:      Prior to Admission Medications   Prescriptions Last Dose Informant Patient Reported? Taking?   DULoxetine (Cymbalta) 60 mg DR capsule 1/29/2025  Yes Yes   Sig: Take 1 capsule (60 mg) by mouth once daily.   acetaminophen (TylenoL) 325 mg tablet Unknown  Yes No   Sig: Take 1 tablet (325 mg) by mouth every 6 hours if needed.   albuterol (ProAir HFA) 90 mcg/actuation inhaler Past Month  Yes Yes   Sig: INHALE 1-2 PUFFS QID PRN SHORTNESS OR BREATH/WHEEZING   allopurinol (Zyloprim) 300 mg tablet 1/29/2025  Yes Yes   Sig: Take 1 tablet (300 mg) by mouth once daily.   aspirin 81 mg EC tablet 1/29/2025 Morning  Yes Yes   Sig: Take 1 tablet (81 mg) by mouth once daily.   aspirin-acetaminophen-caffeine (Excedrin Migraine) 250-250-65 mg tablet Past Month  Yes Yes   Sig: Take 1 tablet by mouth if needed for headaches.   atorvastatin (Lipitor) 40 mg tablet 1/29/2025  Yes Yes   Sig: Take 1 tablet (40 mg) by mouth once daily at bedtime.   buPROPion XL (Wellbutrin XL) 150 mg 24 hr tablet 1/29/2025  Yes Yes   Sig: Take 1 tablet (150 mg) by mouth once daily in the morning. Do not crush, chew, or split.   cholecalciferol (Vitamin  D-3) 50 MCG (2000 UT) tablet 1/29/2025  Yes Yes   Sig: Take 1 tablet (2,000 Units) by mouth once daily.   empagliflozin (Jardiance) 25 mg 1/29/2025  Yes Yes   Sig: Take 1 tablet (25 mg) by mouth once daily.   ezetimibe (Zetia) 10 mg tablet 1/29/2025  Yes Yes   Sig: Take 1 tablet (10 mg) by mouth once daily.   fluticasone (Flonase) 50 mcg/actuation nasal spray Past Month  Yes Yes   Sig: Administer 2 sprays into each nostril once daily at bedtime.   insulin glargine (Lantus U-100 Insulin) 100 unit/mL injection 1/29/2025  Yes Yes   Sig: Inject 29 Units under the skin once daily in the morning.   metFORMIN (Glucophage) 1,000 mg tablet 1/29/2025  Yes Yes   Sig: Take 1 tablet (1,000 mg) by mouth every 12 hours.   metoprolol tartrate (Lopressor) 25 mg tablet 1/29/2025  No Yes   Sig: Take 0.5 tablets (12.5 mg) by mouth 2 times a day.   midodrine (Proamatine) 2.5 mg tablet 1/29/2025  No Yes   Sig: Take 1 tablet (2.5 mg) by mouth 3 times daily (morning, midday, late afternoon).   nitroglycerin (Nitrostat) 0.4 mg SL tablet   Yes No   Sig: Place 1 tablet (0.4 mg) under the tongue every 5 minutes if needed for chest pain (MAX THREE TABS PER EPISODE). PLACE 1 TABLET UNDER THE TONGUE EVERY 5 MINUTES FOR UP TO 3 DOSES AS NEEDED FOR CHEST PAIN.CALL 911 IF PAIN PERSISTS.   omeprazole (PriLOSEC) 20 mg DR capsule 1/29/2025  Yes Yes   Sig: Take 1 capsule (20 mg) by mouth twice a day.   pregabalin (Lyrica) 150 mg capsule 1/29/2025  Yes Yes   Sig: Take 1 capsule (150 mg) by mouth 2 times a day.   tamsulosin (Flomax) 0.4 mg 24 hr capsule 1/29/2025  Yes Yes   Sig: Take 1 capsule (0.4 mg) by mouth once daily at bedtime.   ticagrelor (Brilinta) 60 mg tablet 1/29/2025  Yes Yes   Sig: Take 1 tablet (60 mg) by mouth 2 times a day.      Facility-Administered Medications: None       Melanie Walsh

## 2025-01-31 LAB
ANION GAP SERPL CALC-SCNC: 10 MMOL/L (ref 10–20)
BUN SERPL-MCNC: 17 MG/DL (ref 6–23)
CALCIUM SERPL-MCNC: 8.2 MG/DL (ref 8.6–10.3)
CHLORIDE SERPL-SCNC: 113 MMOL/L (ref 98–107)
CO2 SERPL-SCNC: 24 MMOL/L (ref 21–32)
CREAT SERPL-MCNC: 1.06 MG/DL (ref 0.5–1.3)
EGFRCR SERPLBLD CKD-EPI 2021: 78 ML/MIN/1.73M*2
ERYTHROCYTE [DISTWIDTH] IN BLOOD BY AUTOMATED COUNT: 14.5 % (ref 11.5–14.5)
GLUCOSE BLD MANUAL STRIP-MCNC: 130 MG/DL (ref 74–99)
GLUCOSE BLD MANUAL STRIP-MCNC: 202 MG/DL (ref 74–99)
GLUCOSE BLD MANUAL STRIP-MCNC: 232 MG/DL (ref 74–99)
GLUCOSE BLD MANUAL STRIP-MCNC: 263 MG/DL (ref 74–99)
GLUCOSE SERPL-MCNC: 164 MG/DL (ref 74–99)
HCT VFR BLD AUTO: 39.5 % (ref 41–52)
HGB BLD-MCNC: 12.5 G/DL (ref 13.5–17.5)
MCH RBC QN AUTO: 28.7 PG (ref 26–34)
MCHC RBC AUTO-ENTMCNC: 31.6 G/DL (ref 32–36)
MCV RBC AUTO: 91 FL (ref 80–100)
NRBC BLD-RTO: 0 /100 WBCS (ref 0–0)
PLATELET # BLD AUTO: 111 X10*3/UL (ref 150–450)
POTASSIUM SERPL-SCNC: 3.8 MMOL/L (ref 3.5–5.3)
RBC # BLD AUTO: 4.35 X10*6/UL (ref 4.5–5.9)
SODIUM SERPL-SCNC: 143 MMOL/L (ref 136–145)
TTG IGA SER IA-ACNC: <1 U/ML
WBC # BLD AUTO: 5.4 X10*3/UL (ref 4.4–11.3)

## 2025-01-31 PROCEDURE — 82525 ASSAY OF COPPER: CPT | Performed by: INTERNAL MEDICINE

## 2025-01-31 PROCEDURE — 2500000002 HC RX 250 W HCPCS SELF ADMINISTERED DRUGS (ALT 637 FOR MEDICARE OP, ALT 636 FOR OP/ED): Performed by: PHYSICIAN ASSISTANT

## 2025-01-31 PROCEDURE — 2500000001 HC RX 250 WO HCPCS SELF ADMINISTERED DRUGS (ALT 637 FOR MEDICARE OP): Performed by: INTERNAL MEDICINE

## 2025-01-31 PROCEDURE — 2500000001 HC RX 250 WO HCPCS SELF ADMINISTERED DRUGS (ALT 637 FOR MEDICARE OP): Performed by: PHYSICIAN ASSISTANT

## 2025-01-31 PROCEDURE — 36415 COLL VENOUS BLD VENIPUNCTURE: CPT | Performed by: PHYSICIAN ASSISTANT

## 2025-01-31 PROCEDURE — G0378 HOSPITAL OBSERVATION PER HR: HCPCS

## 2025-01-31 PROCEDURE — 97165 OT EVAL LOW COMPLEX 30 MIN: CPT | Mod: GO

## 2025-01-31 PROCEDURE — 97162 PT EVAL MOD COMPLEX 30 MIN: CPT | Mod: GP

## 2025-01-31 PROCEDURE — 99232 SBSQ HOSP IP/OBS MODERATE 35: CPT | Performed by: PHYSICIAN ASSISTANT

## 2025-01-31 PROCEDURE — 83516 IMMUNOASSAY NONANTIBODY: CPT | Mod: PORLAB | Performed by: INTERNAL MEDICINE

## 2025-01-31 PROCEDURE — 2500000002 HC RX 250 W HCPCS SELF ADMINISTERED DRUGS (ALT 637 FOR MEDICARE OP, ALT 636 FOR OP/ED): Performed by: INTERNAL MEDICINE

## 2025-01-31 PROCEDURE — 82947 ASSAY GLUCOSE BLOOD QUANT: CPT

## 2025-01-31 PROCEDURE — 85027 COMPLETE CBC AUTOMATED: CPT | Performed by: PHYSICIAN ASSISTANT

## 2025-01-31 PROCEDURE — 97116 GAIT TRAINING THERAPY: CPT | Mod: GP

## 2025-01-31 PROCEDURE — 80048 BASIC METABOLIC PNL TOTAL CA: CPT | Performed by: PHYSICIAN ASSISTANT

## 2025-01-31 PROCEDURE — 99232 SBSQ HOSP IP/OBS MODERATE 35: CPT | Performed by: NURSE PRACTITIONER

## 2025-01-31 RX ORDER — VENLAFAXINE HYDROCHLORIDE 150 MG/1
150 CAPSULE, EXTENDED RELEASE ORAL DAILY
Status: DISCONTINUED | OUTPATIENT
Start: 2025-01-31 | End: 2025-02-02 | Stop reason: HOSPADM

## 2025-01-31 RX ADMIN — EZETIMIBE 10 MG: 10 TABLET ORAL at 21:46

## 2025-01-31 RX ADMIN — PREGABALIN 150 MG: 75 CAPSULE ORAL at 21:46

## 2025-01-31 RX ADMIN — VENLAFAXINE HYDROCHLORIDE 150 MG: 150 CAPSULE, EXTENDED RELEASE ORAL at 15:49

## 2025-01-31 RX ADMIN — DULOXETINE HYDROCHLORIDE 60 MG: 30 CAPSULE, DELAYED RELEASE ORAL at 09:41

## 2025-01-31 RX ADMIN — PANTOPRAZOLE SODIUM 20 MG: 20 TABLET, DELAYED RELEASE ORAL at 06:20

## 2025-01-31 RX ADMIN — TICAGRELOR 90 MG: 90 TABLET ORAL at 21:46

## 2025-01-31 RX ADMIN — ASPIRIN 81 MG: 81 TABLET, COATED ORAL at 09:41

## 2025-01-31 RX ADMIN — INSULIN LISPRO 6 UNITS: 100 INJECTION, SOLUTION INTRAVENOUS; SUBCUTANEOUS at 12:13

## 2025-01-31 RX ADMIN — EMPAGLIFLOZIN 25 MG: 25 TABLET, FILM COATED ORAL at 09:41

## 2025-01-31 RX ADMIN — FLUDROCORTISONE ACETATE 0.1 MG: 0.1 TABLET ORAL at 09:41

## 2025-01-31 RX ADMIN — ATORVASTATIN CALCIUM 40 MG: 40 TABLET, FILM COATED ORAL at 21:46

## 2025-01-31 RX ADMIN — MIDODRINE HYDROCHLORIDE 5 MG: 2.5 TABLET ORAL at 10:20

## 2025-01-31 RX ADMIN — FINASTERIDE 5 MG: 5 TABLET, FILM COATED ORAL at 09:41

## 2025-01-31 RX ADMIN — BUPROPION HYDROCHLORIDE 150 MG: 150 TABLET, EXTENDED RELEASE ORAL at 09:41

## 2025-01-31 RX ADMIN — PREGABALIN 150 MG: 75 CAPSULE ORAL at 09:41

## 2025-01-31 RX ADMIN — MIDODRINE HYDROCHLORIDE 5 MG: 2.5 TABLET ORAL at 16:01

## 2025-01-31 RX ADMIN — MIDODRINE HYDROCHLORIDE 5 MG: 2.5 TABLET ORAL at 12:12

## 2025-01-31 RX ADMIN — INSULIN GLARGINE 29 UNITS: 100 INJECTION, SOLUTION SUBCUTANEOUS at 09:41

## 2025-01-31 RX ADMIN — INSULIN LISPRO 4 UNITS: 100 INJECTION, SOLUTION INTRAVENOUS; SUBCUTANEOUS at 15:49

## 2025-01-31 ASSESSMENT — COGNITIVE AND FUNCTIONAL STATUS - GENERAL
CLIMB 3 TO 5 STEPS WITH RAILING: A LITTLE
HELP NEEDED FOR BATHING: A LITTLE
STANDING UP FROM CHAIR USING ARMS: A LITTLE
DRESSING REGULAR LOWER BODY CLOTHING: A LITTLE
STANDING UP FROM CHAIR USING ARMS: A LITTLE
MOVING TO AND FROM BED TO CHAIR: A LITTLE
TURNING FROM BACK TO SIDE WHILE IN FLAT BAD: A LITTLE
WALKING IN HOSPITAL ROOM: A LITTLE
HELP NEEDED FOR BATHING: A LITTLE
DAILY ACTIVITIY SCORE: 21
CLIMB 3 TO 5 STEPS WITH RAILING: A LOT
TOILETING: A LITTLE
MOBILITY SCORE: 18
DAILY ACTIVITIY SCORE: 21
WALKING IN HOSPITAL ROOM: A LITTLE
DRESSING REGULAR UPPER BODY CLOTHING: A LITTLE
DRESSING REGULAR LOWER BODY CLOTHING: A LITTLE
MOBILITY SCORE: 21

## 2025-01-31 ASSESSMENT — ENCOUNTER SYMPTOMS
FOCAL WEAKNESS: 0
RESPIRATORY NEGATIVE: 1
FALLS: 0
SHORTNESS OF BREATH: 0
SYNCOPE: 0
MEMORY LOSS: 0
DEPRESSION: 0
DYSPNEA ON EXERTION: 0
DECREASED APPETITE: 0
COUGH: 0
LIGHT-HEADEDNESS: 1
IRREGULAR HEARTBEAT: 0
CONSTITUTIONAL NEGATIVE: 1
GASTROINTESTINAL NEGATIVE: 1
BLURRED VISION: 0
PALPITATIONS: 0
ORTHOPNEA: 0

## 2025-01-31 ASSESSMENT — PAIN SCALES - GENERAL
PAINLEVEL_OUTOF10: 0 - NO PAIN

## 2025-01-31 ASSESSMENT — PAIN - FUNCTIONAL ASSESSMENT
PAIN_FUNCTIONAL_ASSESSMENT: 0-10
PAIN_FUNCTIONAL_ASSESSMENT: 0-10

## 2025-01-31 ASSESSMENT — ACTIVITIES OF DAILY LIVING (ADL)
ADL_ASSISTANCE: INDEPENDENT
BATHING_ASSISTANCE: STAND BY
ADL_ASSISTANCE: INDEPENDENT

## 2025-01-31 NOTE — PROGRESS NOTES
01/31/25 1533   Discharge Planning   Home or Post Acute Services None   Expected Discharge Disposition Home     Attempt made to speak with pt x2. Per care rounds pt may dishcarge later today if cleared by cardiology. If not then pt may be ready tomorrow. Meds have been adjusted for orthostatics. Plan remains for pt to go home without CT needs. [facility] 24/24. CT will follow.

## 2025-01-31 NOTE — PROGRESS NOTES
Physical Therapy    Physical Therapy Evaluation    Patient Name: Manish Lance  MRN: [id number]  Department: [address]  Room: [address] [place] [address]  Today's Date: 1/31/2025   Time Calculation  Start Time: 1020  Stop Time: 1046  Time Calculation (min): 26 min    Assessment/Plan   PT Assessment  PT Assessment Results: Decreased strength, Decreased endurance, Impaired balance, Decreased mobility ((+) ORTHOS)  Rehab Prognosis: Fair  Barriers to Discharge Home: Caregiver assistance, Physical needs  Caregiver Assistance: Caregiver assistance needed per identified barriers - however, level of patient's required assistance exceeds assistance available at home  Physical Needs: Stair navigation into home limited by function/safety, Ambulating household distances limited by function/safety, Intermittent mobility assistance needed, High falls risk due to function or environment ((+) ORTHOS)  Evaluation/Treatment Tolerance: Patient limited by fatigue ((+) ORTHOS)  End of Session Communication: Bedside nurse (MOBILTYSTATUS (+) ORTHOS)  Assessment Comment: CGA TO AMB INHALL 2 LOB W/ SELF CORRECTION, (+) ORTHOS THROUGHOUT MOBILTY, FALLRISK RECOMMEND LOW REHAB PENDING PROGRESS ORTHOS  End of Session Patient Position: Bed, 3 rail up, Alarm on (CALLLIGHT IN REACH)  IP OR SWING BED PT PLAN  Inpatient or Swing Bed: Inpatient  PT Plan  Treatment/Interventions: Transfer training, Gait training, Stair training, Endurance training, Strengthening  PT Plan: Ongoing PT  PT Frequency: 4 times per week  PT Discharge Recommendations: Low intensity level of continued care  Equipment Recommended upon Discharge:  (TBD)  PT Recommended Transfer Status: Assist x1, Stand by assist (CANE/FWW PRN FOR STABILTIY)  PT - OK to Discharge: Yes (WHENMEDICALLY CLEARED)    Subjective   General Visit Information:  General  Reason for Referral: IMPAIRED MOBILTIY  Referred By: JOANIE CABRERA  Past Medical History Relevant to Rehab: FALLS, SYNCOPE; DX: FALLS, SYNCOPE, CHI,  DEHYDRATION, FACIAL CONTUSION, (+)ORTHOS; HX: CARDIAC STENTS, SYNCOPE, DM, CAD, HTN, BACKSURG, CTR, GASTRIC BYPASS, JAWSURG/PLATE, GOUT, NEUROPATHY, ASTHMA, FALLS  Co-Treatment: OT  Co-Treatment Reason: maximize pt safety  Prior to Session Communication: Bedside nurse (OK FOR THERAPY)  Patient Position Received: Bed, 3 rail up, Alarm on (ROOM 2322, ALERT IV AGREES TO THERAPY)  Home Living:  Home Living  Type of Home: House  Lives With: Spouse  Home Adaptive Equipment: None  Home Layout: Multi-level, Bed/bath upstairs, Full bath main level  Home Access: Stairs to enter with rails  Bathroom Shower/Tub: Walk-in shower  Bathroom Toilet: Standard  Bathroom Equipment: None  Home Living Comments: WIFE WORKS SO PT. IS [employer] AT [facility] [employer], HE IS RECENTLY RETIRED  Prior Level of Function:  Prior Function Per Pt/Caregiver Report  Level of [place]: Independent with ADLs and functional transfers, Independent with homemaking with ambulation  Receives Help From: Family  ADL Assistance: Independent  Homemaking Assistance: Independent  Ambulatory Assistance: Independent  Prior Function Comments: DRIVES  Precautions:  Precautions  Medical Precautions: Fall precautions ((+) ORTHOS)         Vital Signs Comment: TOOK ORTHOS: SUPINE 168/73 HR 67; SITTING 170/87 HR 59, STANDING 132/78 HR 70; POST /72 HR 67     Objective   Pain:  Pain Assessment  0-10 (Numeric) Pain Score: 0 - No pain  Cognition:  Cognition  Overall Cognitive Status: Within Functional Limits  Orientation Level: Oriented X4  Safety/Judgement: Exceptions to WFL  Complex Functional Tasks: Minimal  Novel Situations: Minimal  Other (Comment): FOR VC TO PACE ACTIVITY  2/2 (+)ORTHOS    General Assessments:                  Activity Tolerance  Endurance:  ((+)ORTHOS LIMIT MOBILTIY)    Sensation  Light Touch: No apparent deficits    Strength  Strength Comments: ROM LEGS WFL, STRENGHTIN LEGS 3+/5  Strength  Strength Comments: ROM LEGS WFL, STRENGHTIN LEGS 3+/5                      Static Sitting Balance  Static Sitting-Comment/Number of Minutes: GOOD  Dynamic Sitting Balance  Dynamic Sitting-Comments: FAIR    Static Standing Balance  Static Standing-Comment/Number of Minutes: FAIR/FAIR-  Dynamic Standing Balance  Dynamic Standing-Comments: FAIR/FAIR-  Functional Assessments:  Bed Mobility  Bed Mobility:  (SUPINE<>SIT SBA,  VC TO MOVE SLOWLY 2/2  HX OF FALLS, SAT 5 MIN FOR BP TO BE TAKEN)    Transfers  Transfer:  (SIT<> CGA VC FOR SAFETY 2/2 FALLS, STOOD 3 MIN FOR BP TO BE TAKEN)    Ambulation/Gait Training  Ambulation/Gait Training Performed:  (CGA  FT NO C/OLIGHTHEADEDNESS, HAD 2 LOB W/ SELF CORRECTION WHEN HE BEGAN AMB, SLIGHT FATIGUE W/ AMB)  Extremity/Trunk Assessments:     Outcome Measures:  [facility] Basic Mobility  Turning from your back to your side while in a flat bed without using bedrails: None  Moving from lying on your back to sitting on the side of a flat bed without using bedrails: A little  Moving to and from bed to chair (including a wheelchair): A little  Standing up from a chair using your arms (e.g. wheelchair or bedside chair): A little  To walk in hospital room: A little  Climbing 3-5 steps with railing: A lot  Basic Mobility - Total Score: 18    Encounter Problems       Encounter Problems (Active)       Mobility       STG - Patient will ambulate (Not Progressing)       Start:  01/31/25    Expected End:  02/07/25       SBA FOR FWW/CANE AMB(PRN FOR STABILITY) 200 FT         STG - Patient will ascend and descend four to six stairs (Not Progressing)       Start:  01/31/25    Expected End:  02/08/25       RAILS SBA         Goal 1 (Not Progressing)       Start:  01/31/25    Expected End:  02/14/25       20 REPS RROM INCREASING STRENGTH FOR STABLE GAIT            Pain - Adult              Education Documentation  Mobility Training, taught by Luh Bustillos PT at 1/31/2025  4:12 PM.  Learner: Patient  Readiness: Acceptance  Method: Explanation  Response: Needs  Reinforcement  Comment: MOBILITYSAFETY,PACE ACTIVITY, MONITOR BP    Education Comments  No comments found.

## 2025-01-31 NOTE — CARE PLAN
The clinical goals for the shift include Pt will remain free from injury and falls overnight        Problem: Pain - Adult  Goal: Verbalizes/displays adequate comfort level or baseline comfort level  Outcome: Progressing     Problem: Safety - Adult  Goal: Free from fall injury  Outcome: Progressing     Problem: Discharge Planning  Goal: Discharge to home or other facility with appropriate resources  Outcome: Progressing     Problem: Chronic Conditions and Co-morbidities  Goal: Patient's chronic conditions and co-morbidity symptoms are monitored and maintained or improved  Outcome: Progressing     Problem: Nutrition  Goal: Nutrient intake appropriate for maintaining nutritional needs  Outcome: Progressing     Problem: Diabetes  Goal: Achieve decreasing blood glucose levels by end of shift  Outcome: Progressing  Goal: Increase stability of blood glucose readings by end of shift  Outcome: Progressing  Goal: Decrease in ketones present in urine by end of shift  Outcome: Progressing  Goal: Maintain electrolyte levels within acceptable range throughout shift  Outcome: Progressing  Goal: Maintain glucose levels >70mg/dl to <250mg/dl throughout shift  Outcome: Progressing  Goal: No changes in neurological exam by end of shift  Outcome: Progressing  Goal: Learn about and adhere to nutrition recommendations by end of shift  Outcome: Progressing  Goal: Vital signs within normal range for age by end of shift  Outcome: Progressing  Goal: Increase self care and/or family involovement by end of shift  Outcome: Progressing  Goal: Receive DSME education by end of shift  Outcome: Progressing

## 2025-01-31 NOTE — PROGRESS NOTES
"  [facility] Gastroenterology Progress Note    ASSESSMENT and PLAN:       Manish Lance is a 64 y.o. male with a significant past medical history of CAD, aortic stenosis, diabetes, Ashok-En-Y gastric bypass, and BPH  who presented with after a fall. GI was consulted for \"Unexplained weight loss\" .       Weight loss  Patient reported a history of weight loss in the past, but has been stable for greater than 1 year. He has a history of gastric bypass in the past, and some possible malabsorption could be related to that. Previous extensive workup including endoscopy in the past that has not shown any abnormality and ultimately he has not evidence of malabsorption. He continues to be asymptomatic from a GI perspective.  - additional labs ordered      GI will sign off at this time, but please call with questions.        Case was reviewed with Dr. Marti.          Mala Melara PA-C        SUBJECTIVE and INTERVAL HISTORY:       Manish Lance  is a 64 y.o. male with a significant past medical history of CAD, aortic stenosis, diabetes, Ashok-En-Y gastric bypass, and BPH  who presented with after a fall. GI was consulted for \"Unexplained weight loss\" .    HPI:  Patient seen and examined. He states that he is feeling well. Resting BP in normal range. He denies lightheaded, dizziness, CP, SOB, N/V, abdominal pain. No further BM since 11am yesterday.    Review of systems:      performed a complete 10 point review of systems and it is negative except as noted in HPI or above.    All other systems have been reviewed and are negative.          OBJECTIVE:       Last Recorded Vitals:  Vitals:    01/30/25 2021 01/31/25 0023 01/31/25 0443 01/31/25 0900   BP: 157/87 151/78 136/71 (!) 167/94   BP Location: Left arm Left arm Left arm Right arm   Patient Position: Lying Lying Lying Lying   Pulse: 62 56 54 64   Resp: 18 17 17 17   Temp: 36.1 °C (97 °F) 36.3 °C (97.4 °F) 36.6 °C (97.9 °F) 36.4 °C (97.6 °F)   TempSrc: Temporal Temporal " "Temporal Temporal   SpO2: 95% 96% 94% 96%   Weight:       Height:         BP (!) 167/94 (BP Location: Right arm, Patient Position: Lying) Comment: let nurse know  Pulse 64   Temp 36.4 °C (97.6 °F) (Temporal)   Resp 17   Ht 1.778 m (5' 10\")   Wt 73 kg (161 lb)   SpO2 96%   BMI 23.10 kg/m²      Physical Exam:    Physical Exam      Inpatient Medications:  aspirin, 81 mg, oral, Daily  atorvastatin, 40 mg, oral, Nightly  buPROPion XL, 150 mg, oral, q AM  DULoxetine, 60 mg, oral, Daily  empagliflozin, 25 mg, oral, Daily  ezetimibe, 10 mg, oral, Nightly  finasteride, 5 mg, oral, Daily  fludrocortisone, 0.1 mg, oral, Daily  insulin glargine, 29 Units, subcutaneous, q AM  insulin lispro, 0-10 Units, subcutaneous, TID AC  midodrine, 5 mg, oral, TID  pantoprazole, 20 mg, oral, Daily before breakfast  pregabalin, 150 mg, oral, BID      PRN medications: acetaminophen **OR** acetaminophen **OR** acetaminophen, alum-mag hydroxide-simeth, benzocaine-menthol, dextromethorphan-guaifenesin, dextrose, dextrose, glucagon, glucagon, guaiFENesin, melatonin, ondansetron **OR** ondansetron, polyethylene glycol    Outpatient Medications:  Prior to Admission medications    Medication Sig Start Date End Date Taking? Authorizing Provider   albuterol (ProAir HFA) 90 mcg/actuation inhaler INHALE 1-2 PUFFS QID PRN SHORTNESS OR BREATH/WHEEZING 7/14/17  Yes Historical Provider, MD   allopurinol (Zyloprim) 300 mg tablet Take 1 tablet (300 mg) by mouth once daily. 4/1/19  Yes Historical Provider, MD   aspirin 81 mg EC tablet Take 1 tablet (81 mg) by mouth once daily.   Yes Historical Provider, MD   aspirin-acetaminophen-caffeine (Excedrin Migraine) 250-250-65 mg tablet Take 1 tablet by mouth if needed for headaches.   Yes Historical Provider, MD   atorvastatin (Lipitor) 40 mg tablet Take 1 tablet (40 mg) by mouth once daily at bedtime. 7/6/17  Yes Historical Provider, MD   buPROPion XL (Wellbutrin XL) 150 mg 24 hr tablet Take 1 tablet (150 mg) " by mouth once daily in the morning. Do not crush, chew, or split.   Yes Historical Provider, MD   cholecalciferol (Vitamin D-3) 50 MCG (2000 UT) tablet Take 1 tablet (2,000 Units) by mouth once daily. 10/12/22  Yes Historical Provider, MD   empagliflozin (Jardiance) 25 mg Take 1 tablet (25 mg) by mouth once daily. 10/12/22  Yes Historical Provider, MD   ezetimibe (Zetia) 10 mg tablet Take 1 tablet (10 mg) by mouth once daily. 1/21/21  Yes Historical Provider, MD   fluticasone (Flonase) 50 mcg/actuation nasal spray Administer 2 sprays into each nostril once daily at bedtime. 10/12/22  Yes Historical Provider, MD   insulin glargine (Lantus U-100 Insulin) 100 unit/mL injection Inject 29 Units under the skin once daily in the morning.   Yes Historical Provider, MD   metFORMIN (Glucophage) 1,000 mg tablet Take 1 tablet (1,000 mg) by mouth every 12 hours. 7/14/16  Yes Historical Provider, MD   metoprolol tartrate (Lopressor) 25 mg tablet Take 0.5 tablets (12.5 mg) by mouth 2 times a day.  Patient taking differently: Take 1 tablet (25 mg) by mouth 2 times a day. 10/6/24  Yes Marline Peres MD   midodrine (Proamatine) 2.5 mg tablet Take 1 tablet (2.5 mg) by mouth 3 times daily (morning, midday, late afternoon). 10/6/24  Yes Marline Peres MD   omeprazole (PriLOSEC) 20 mg DR capsule Take 1 capsule (20 mg) by mouth twice a day. 10/12/22  Yes Historical Provider, MD   pregabalin (Lyrica) 300 mg capsule Take 1 capsule (300 mg) by mouth 2 times a day. 10/12/22  Yes Historical Provider, MD   tamsulosin (Flomax) 0.4 mg 24 hr capsule Take 1 capsule (0.4 mg) by mouth once daily at bedtime. 7/14/16  Yes Historical Provider, MD   ticagrelor (Brilinta) 90 mg tablet Take 1 tablet (90 mg) by mouth 2 times a day.   Yes Historical Provider, MD   DULoxetine (Cymbalta) 60 mg DR capsule Take 1 capsule (60 mg) by mouth once daily. 10/12/22 1/30/25 Yes Historical Provider, MD   ferrous sulfate 325 (65 Fe) MG EC tablet Take 1 tablet by mouth  "3 times daily (morning, midday, late afternoon). Do not crush, chew, or split.    Historical Provider, MD   naproxen (Naprosyn) 375 mg tablet Take 1 tablet (375 mg) by mouth 2 times a day as needed.    Historical Provider, MD   nitroglycerin (Nitrostat) 0.4 mg SL tablet Place 1 tablet (0.4 mg) under the tongue every 5 minutes if needed for chest pain (MAX THREE TABS PER EPISODE). PLACE 1 TABLET UNDER THE TONGUE EVERY 5 MINUTES FOR UP TO 3 DOSES AS NEEDED FOR CHEST PAIN.CALL 911 IF PAIN PERSISTS. 6/6/18   Historical Provider, MD   venlafaxine XR (Effexor-XR) 150 mg 24 hr capsule Take 1 capsule (150 mg) by mouth once daily. Do not crush or chew.    Historical Provider, MD   acetaminophen (TylenoL) 325 mg tablet Take 1 tablet (325 mg) by mouth every 6 hours if needed. 7/14/17 1/30/25  Historical Provider, MD       LABS AND IMAGING:     Labs:  Recent labs reviewed in the EMR.    Lab Results   Component Value Date    WBC 5.4 01/31/2025    HGB 12.5 (L) 01/31/2025    HGB 12.3 (L) 01/30/2025    MCV 91 01/31/2025     (L) 01/31/2025     (L) 01/30/2025       Lab Results   Component Value Date     01/31/2025    K 3.8 01/31/2025     (H) 01/31/2025    BUN 17 01/31/2025    CREATININE 1.06 01/31/2025    CREATININE 1.46 (H) 01/30/2025       Lab Results   Component Value Date    BILITOT 0.4 01/30/2025    ALKPHOS 58 01/30/2025    AST 21 01/30/2025    ALT 43 01/30/2025       No results found for: \"CRP\", \"CALPS\"      Imaging:  Recent imaging results reviewed.          "

## 2025-01-31 NOTE — PROGRESS NOTES
"Internal Medicine Progress Note    Name: Manish Lance  : 1960  Chief Complaint: Fall  Primary Care Physician: GENERIC EXTERNAL DATA PROVIDER  Admission date: 2025  Date of service: 2025     History of Present Illness  2025: Patient seen laying back in bed this morning. He is awake and alert, in no acute respiratory distress on room air. States that he has not had any further episodes of lightheadedness or dizziness. BP has been better controlled, cardiology d/c'd his florinef today. PT eval pending. Potential discharge later today if continues to be symptom free    Allergies  No Known Allergies    Review of Systems:   12 point ROS reviewed and negative other than noted above    Objective  VITALS:  /71 (BP Location: Left arm, Patient Position: Lying)   Pulse 54   Temp 36.6 °C (97.9 °F) (Temporal)   Resp 17   Ht 1.778 m (5' 10\")   Wt 73 kg (161 lb)   SpO2 94%   BMI 23.10 kg/m²     Physical Exam:   Constitutional: Well developed, well nourished, in no acute distress. Laying back in bed comfortably and talkative  Eyes: PERRL, EOMI  Head/Neck: Normocephalic, atraumatic. Neck supple, no thyromegaly, JVD. Trachea midline  Respiratory/Thorax: Normal respiratory effort. Lungs CTAB with no wheezing, rales, or rhonchi noted  Cardiovascular: RRR, murmur noted, no or gallops noted. Peripheral pulses 2+  Gastrointestinal: Bowel sounds normal. Abdomen soft, nontender, nondistended, with no palpable masses  Musculoskeletal: No gross deformities. No gross muscular weakness with no ROM limitation  Extremities: No lower extremity edema noted bilaterally  Neurological: Alert and oriented x3, CN II - VII grossly intact  Psychological: Appropriate mood and affect  Skin: No rashes or lesions noted.    Labs-   Lab Results   Component Value Date    WBC 5.4 2025    HGB 12.5 (L) 2025    HCT 39.5 (L) 2025     (L) 2025     2025    K 3.8 2025     (H) " 01/31/2025    CREATININE 1.06 01/31/2025    BUN 17 01/31/2025    CO2 24 01/31/2025    GLUCOSE 164 (H) 01/31/2025    ALT 43 01/30/2025    AST 21 01/30/2025    INR 1.0 09/16/2024       Lab Results   Component Value Date    IRON 54 10/12/2022    FERRITIN 275 10/12/2022    TIBC 295 10/12/2022       Lab Results   Component Value Date    TROPHS 6 01/30/2025    TROPHS <3 10/04/2024    TROPHS 3 10/04/2024        Lab Results   Component Value Date    TSH 1.56 10/28/2023    VITD25 43 10/12/2022       Lab Results   Component Value Date    MG 1.77 01/30/2025    PHOS 4.5 10/28/2023         Transthoracic Echo (TTE) Complete 10/05/2024     Results for orders placed during the hospital encounter of 10/04/24    Transthoracic Echo ([place]) [place] [address]  Phone [phone] Fax [phone]    TRANSTHORACIC ECHOCARDIOGRAM REPORT    Patient Name:      EBONY JAYASHREE    Reading Physician:    [id number] Alexis Castillo MD  Study Date:        10/5/2024           Ordering Provider:    [id number] ARGENIS SANTIAGO  MRN/PID:           [id number]            Fellow:  Accession#:        [id number]        Nurse:  Date of Birth/Age: 1960 / 64      Sonographer:          Rhoda Jones RDCS  years  Gender:            M                   Additional Staff:  Height:            177.80 cm           Admit Date:           10/4/2024  Weight:            72.58 kg            Admission Status:     Inpatient - Routine  BSA / BMI:         1.90 m2 / 22.96     Department Location:  [address]  kg/m2  Blood Pressure: 118 /84 mmHg    Study Type:    TRANSTHORACIC ECHO (TTE) COMPLETE  Diagnosis/ICD: Other forms of dyspnea-R06.09  Indication:    Dyspnea on Exertion  CPT Codes:     Echo Complete w Full [id number]    Patient History:  Pertinent History: HTN, CAD, Hyperlipidemia and CHF. Aortic Stenosis.    Study Detail: The following Echo studies were performed: 2D, M-Mode, Doppler and  color flow. A  bubble study was not performed.      PHYSICIAN INTERPRETATION:  Left Ventricle: The left ventricular systolic function is normal, with a Nickerson's biplane calculated ejection fraction of 62%. There are no regional wall motion abnormalities. The left ventricular cavity size is normal. Spectral Doppler shows a normal pattern of left ventricular diastolic filling.  Left Atrium: The left atrium is normal in size. A bubble study using agitated saline was not performed.  Right Ventricle: The right ventricle is normal in size. There is normal right ventricular global systolic function.  Right Atrium: The right atrium is normal in size.  Aortic Valve: The aortic valve is trileaflet. There is mild to moderate aortic valve thickening. There is evidence of moderate aortic valve stenosis.  The aortic valve dimensionless index is 0.35. There is mild aortic valve regurgitation. The peak instantaneous gradient of the aortic valve is 31.8 mmHg. The mean gradient of the aortic valve is 17.0 mmHg.  Mitral Valve: The mitral valve is normal in structure. There is trace mitral valve regurgitation.  Tricuspid Valve: The tricuspid valve is structurally normal. There is trace tricuspid regurgitation.  Pulmonic Valve: The pulmonic valve is structurally normal. There is physiologic pulmonic valve regurgitation.  Pericardium: No pericardial effusion noted. There is a pericardial fat pad present.  Aorta: The aortic root is normal.      CONCLUSIONS:  1. The left ventricular systolic function is normal, with a Nickerson's biplane calculated ejection fraction of 62%.  2. There is normal right ventricular global systolic function.  3. Moderate aortic valve stenosis.  4. Mild aortic valve regurgitation.    QUANTITATIVE DATA SUMMARY:    2D MEASUREMENTS:           Normal Ranges:  Ao Root d:       3.10 cm   (2.0-3.7cm)  LAs:             4.00 cm   (2.7-4.0cm)  IVSd:            0.70 cm   (0.6-1.1cm)  LVPWd:           0.80 cm   (0.6-1.1cm)  LVIDd:            5.00 cm   (3.9-5.9cm)  LVIDs:           2.50 cm  LV Mass Index:   65.9 g/m2  LV % FS          50.0 %      LA VOLUME:                    Normal Ranges:  LA Vol A4C:        53.5 ml    (22+/-6mL/m2)  LA Vol A2C:        55.0 ml  LA Vol BP:         54.7 ml  LA Vol Index A4C:  28.2ml/m2  LA Vol Index A2C:  29.0 ml/m2  LA Vol Index BP:   28.8 ml/m2  LA Area A4C:       18.6 cm2  LA Area A2C:       18.7 cm2  LA Major Axis A4C: 5.5 cm  LA Major Axis A2C: 5.4 cm  LA Volume Index:   28.8 ml/m2      RA VOLUME BY A/L METHOD:          Normal Ranges:  RA Area A4C:             16.3 cm2      AORTA MEASUREMENTS:         Normal Ranges:  Ao Sinus, d:        3.20 cm (2.1-3.5cm)  Ao STJ, d:          1.90 cm (1.7-3.4cm)  Asc Ao, d:          2.90 cm (2.1-3.4cm)      LV SYSTOLIC FUNCTION BY 2D PLANIMETRY (MOD):  Normal Ranges:  EF-A4C View:    61 % (>=55%)  EF-A2C View:    61 %  EF-Biplane:     62 %  LV EF Reported: 62 %      LV DIASTOLIC FUNCTION:           Normal Ranges:  MV Peak E:             0.98 m/s  (0.7-1.2 m/s)  MV Peak A:             0.84 m/s  (0.42-0.7 m/s)  E/A Ratio:             1.17      (1.0-2.2)  MV e'                  0.114 m/s (>8.0)  MV lateral e'          0.13 m/s  MV medial e'           0.10 m/s  E/e' Ratio:            8.68      (<8.0)      AORTIC VALVE:                      Normal Ranges:  AoV Vmax:                2.82 m/s  (<=1.7m/s)  AoV Peak P.8 mmHg (<20mmHg)  AoV Mean P.0 mmHg (1.7-11.5mmHg)  LVOT Max Trae:            1.05 m/s  (<=1.1m/s)  AoV VTI:                 65.10 cm  (18-25cm)  LVOT VTI:                22.70 cm  LVOT Diameter:           1.90 cm   (1.8-2.4cm)  AoV Area, VTI:           0.99 cm2  (2.5-5.5cm2)  AoV Area,Vmax:           1.06 cm2  (2.5-4.5cm2)  AoV Dimensionless Index: 0.35      AORTIC INSUFFICIENCY:  AI Vmax:       3.86 m/s  AI Half-time:  601 msec  AI Decel Rate: 188.00 cm/s2      RIGHT VENTRICLE:  RV Basal 3.80 cm  RV Mid   3.10 cm  RV Major 7.3 cm  TAPSE:    26.1 mm  RV s'    0.13 m/s      [id number] Alexis Castillo MD  Electronically signed on 10/5/2024 at 1:38:18 PM        ** Final **         Recent Radiological Studies:  CT maxillofacial bones wo IV contrast   Final Result   CT HEAD:   1. No acute intracranial abnormality or calvarial fracture.             CT MAXILLOFACIAL SKELETON:   1. No acute facial bone fracture.   2. Mild soft tissue swelling/contusion within the left face, no   sizable hematoma appreciated.   3. Plate and screw fixation hardware within the anterior right   mandible. The hardware is not flush with the mandible and is back to   a slightly. This is of indeterminate chronicity.             CT CERVICAL SPINE:   1. No acute fracture or traumatic malalignment of the cervical spine.        MACRO:   None             Signed by: Ronald Zambrano 1/29/2025 11:41 PM   Dictation workstation:   [id number]      CT 3D reconstruction   Final Result   CT HEAD:   1. No acute intracranial abnormality or calvarial fracture.             CT MAXILLOFACIAL SKELETON:   1. No acute facial bone fracture.   2. Mild soft tissue swelling/contusion within the left face, no   sizable hematoma appreciated.   3. Plate and screw fixation hardware within the anterior right   mandible. The hardware is not flush with the mandible and is back to   a slightly. This is of indeterminate chronicity.             CT CERVICAL SPINE:   1. No acute fracture or traumatic malalignment of the cervical spine.        MACRO:   None             Signed by: Ronald Zambrano 1/29/2025 11:41 PM   Dictation workstation:   [id number]      CT cervical spine wo IV contrast   Final Result   CT HEAD:   1. No acute intracranial abnormality or calvarial fracture.             CT MAXILLOFACIAL SKELETON:   1. No acute facial bone fracture.   2. Mild soft tissue swelling/contusion within the left face, no   sizable hematoma appreciated.   3. Plate and screw fixation hardware within the anterior right   mandible. The hardware  is not flush with the mandible and is back to   a slightly. This is of indeterminate chronicity.             CT CERVICAL SPINE:   1. No acute fracture or traumatic malalignment of the cervical spine.        MACRO:   None             Signed by: Ronald Zambrano 1/29/2025 11:41 PM   Dictation workstation:   [id number]      CT head W O contrast trauma protocol   Final Result   CT HEAD:   1. No acute intracranial abnormality or calvarial fracture.             CT MAXILLOFACIAL SKELETON:   1. No acute facial bone fracture.   2. Mild soft tissue swelling/contusion within the left face, no   sizable hematoma appreciated.   3. Plate and screw fixation hardware within the anterior right   mandible. The hardware is not flush with the mandible and is back to   a slightly. This is of indeterminate chronicity.             CT CERVICAL SPINE:   1. No acute fracture or traumatic malalignment of the cervical spine.        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  Will be referring to Ortho.  Will continue the NPH at 100 units bid and reduce the regular to 100 units with breakfast and [place] and supper to 85 units to see if we can stop the night time hypoglycemia.      Type 2 Diabetes Mellitus With Diabetic Neuropathy, With Long-Term Current Use of Insulin   Hyperlipidemia   Nonrheumatic Aortic Valve Stenosis          Plan  Orthostatic Hypotension  Last adm, amlodipine and lisinopril were stopped, started on midodrine 2.5mg TID  Still on metoprolol and Flomax -- discontinued here  Start finasteride in place of Flomax  Increased midodrine to 5 mg 3 times daily  Cardiology consultation -- given florinef, now d/c'd due to elevated BP  PT/OT pending     Moderate Aortic Stenosis  This can cause syncope but more likely due to medications  above     Acute Renal Failure  Suspect prerenal in setting of pathology above  Improved with IVF  Continue to follow    Unexplained weight loss  Seen by cardiology this AM, recommending GI consult for possible malabsorption issues  Consult to GI -- No additional work up recommended at this time     Concussion  Wife says that memory is poor since recent falls     Other Issues  CAD s/p stent: Home meds  IDDM type II: MDSS + home Lantus  BPH: Exchanged Flomax for finasteride     DVT Prophy  SCDs    Disposition  Home, potentially later today vs tomorrow pending PT eval if ok with cardiology    Code Status: Full Code     Quinn Romero PA-C   1/31/2025  7:20 AM

## 2025-01-31 NOTE — CARE PLAN
Problem: Pain - Adult  Goal: Verbalizes/displays adequate comfort level or baseline comfort level  Outcome: Progressing  Flowsheets (Taken 1/31/2025 4222)  Verbalizes/displays adequate comfort level or baseline comfort level:   Assess pain using appropriate pain scale   Administer analgesics based on type and severity of pain and evaluate response   Implement non-pharmacological measures as appropriate and evaluate response   Consider cultural and social influences on pain and pain management     Problem: Safety - Adult  Goal: Free from fall injury  Outcome: Progressing     Problem: Discharge Planning  Goal: Discharge to home or other facility with appropriate resources  Outcome: Progressing     Problem: Chronic Conditions and Co-morbidities  Goal: Patient's chronic conditions and co-morbidity symptoms are monitored and maintained or improved  Outcome: Progressing     Problem: Nutrition  Goal: Nutrient intake appropriate for maintaining nutritional needs  Outcome: Progressing     Problem: Diabetes  Goal: Achieve decreasing blood glucose levels by end of shift  Outcome: Progressing  Goal: Increase stability of blood glucose readings by end of shift  Outcome: Progressing  Goal: Decrease in ketones present in urine by end of shift  Outcome: Progressing  Goal: Maintain electrolyte levels within acceptable range throughout shift  Outcome: Progressing  Goal: Maintain glucose levels >70mg/dl to <250mg/dl throughout shift  Outcome: Progressing  Goal: No changes in neurological exam by end of shift  Outcome: Progressing  Goal: Learn about and adhere to nutrition recommendations by end of shift  Outcome: Progressing  Goal: Vital signs within normal range for age by end of shift  Outcome: Progressing  Goal: Increase self care and/or family involovement by end of shift  Outcome: Progressing  Goal: Receive DSME education by end of shift  Outcome: Progressing

## 2025-01-31 NOTE — PROGRESS NOTES
Occupational Therapy  Evaluation    Patient Name: Manish Lance  MRN: [id number]  Today's Date: 1/31/2025  Time Calculation  Start Time: 1019  Stop Time: 1045  Time Calculation (min): 26 min    Current Problem:   1. Fall, initial encounter    2. Closed head injury, initial encounter    3. Contusion of face, initial encounter    4. Dehydration    5. Syncope, unspecified syncope type    6. Orthostatic hypotension        OT order: OT eval and treat   Referred by:    Reason for referral: ADLs and safety assessment  Past medical history related to rehab: CAD,IDDM2, aortic stenosis, BPH, orthostatic hypotension    Precautions:   Medical Precautions: Fall precautions  Precautions Comment: orthostatic    ASSESSMENT  OT Assessment: pt demonstrates functioning close to baseline function, requires supervision for transfers and CGA for functional mobility, pt demonstrating orthostatic hypertension. Pt would benefit from in house skilled OT dudring hospital stay, no needs anticipated at discharge.. Pt with Decreased functional mobility    PLAN  Frequency: 1 time per week  Treatment Interventions: Functional transfer training, Compensatory technique education  Discharge Recommendations: No OT needed after discharge  OT OK to discharge: Yes    GENERAL VISIT INFORMATION   Start of session communication: Bedside nurse  End of session communication: Bedside nurse  Family/caregiver present:    Caregiver feedback:    Co-Treatment: PT  Reason for co-treatment: maximize pt safety   Position Pt Received:  Bed, 3 rail up, Alarm on  End of session position: Bed, 3 rail up, Alarm on    SUBJECTIVE  Home Living:  Type of Home: House  Lives With: Spouse  Home Adaptive Equipment: None  Home Layout: Multi-level, Bed/bath upstairs, Full bath main level  Home Access: Stairs to enter with rails  Bathroom Shower/Tub: Walk-in shower  Bathroom Toilet: Standard  Bathroom Equipment: None  Home Living Comments: wife works, pt is retired     Prior Level  of Function:  Level of [place]: Independent with ADLs and functional transfers, Independent with homemaking with ambulation  Receives Help From: Family  ADL Assistance: Independent  Homemaking Assistance: Independent  Ambulatory Assistance: Independent  Vocational: Retired    Pain:  Assessment: 0-10  Score: 0 - No pain    OBJECTIVE  Vital Signs:  BP Location: Left arm  BP Method: Automatic  Vital Signs Comment: supine-168/73, EOB-170/87, standing- 132/78, post functional mobility household distances- 129/72 (orthostatic)    Cognition:  Overall Cognitive Status: Within Functional Limits  Orientation Level: Oriented X4             Current ADL function:   EATING:  Stand by     GROOMING: Stand by     BATHING: Stand by     UB DRESSING: Stand by     LB DRESSING: Stand by     TOILETING: Stand by  (pt demonstrated toileting routinne with distant supervision for safety)  ADL comments:       Activity Tolerance:  Endurance: Endurance does not limit participation in activity    Bed Mobility/Transfers:   Bed Mobility  Bed Mobility: Yes  Bed Mobility 1  Bed Mobility 1: Supine to sitting, Sitting to supine  Level of Assistance 1: Close supervision  Bed Mobility Comments 1: to from EOB  Transfers  Transfer: Yes  Transfer 1  Transfer From 1: Sit to, Stand to  Transfer to 1: Stand, Sit  Technique 1: Sit to stand, Stand to sit  Transfer Level of Assistance 1: Close supervision    Ambulation/Gait Training:  Functional Mobility  Functional Mobility Performed: Yes  Functional Mobility 1  Assistance 1: Contact guard       Vision: Vision - Basic Assessment  Current Vision: No visual deficits   and Vision - Complex Assessment  Ocular Range of Motion: Within Functional Limits    Sensation:  Sensation Comment: no reported n/t    Strength:  Strength Comments: BUEs grossly WFL           Coordination:  Movements are Fluid and Coordinated: Yes     Hand Function:  Hand Function  Gross Grasp: Functional  Coordination:  Functional    Extremities: RUE   RUE : Within Functional Limits and LUE   LUE: Within Functional Limits    Outcome Measures: [facility] Daily Activity   Putting on and taking off regular lower body clothing: A little  Bathing (including washing, rinsing, drying): A little  Putting on and taking off regular upper body clothing: None  Toileting, which includes using toilet, bedpan or urinal: A little  Taking care of personal grooming such as brushing teeth: None   Eating Meals: None   Daily Activity - Total Score: 21    EDUCATION:     Education Documentation  Body Mechanics, taught by Alissa Reynolds OT at 1/31/2025 12:58 PM.  Learner: Patient  Readiness: Acceptance  Method: Explanation  Response: Verbalizes Understanding    Precautions, taught by Alissa Reynolds OT at 1/31/2025 12:58 PM.  Learner: Patient  Readiness: Acceptance  Method: Explanation  Response: Verbalizes Understanding    ADL Training, taught by Alissa Reynolds OT at 1/31/2025 12:58 PM.  Learner: Patient  Readiness: Acceptance  Method: Explanation  Response: Verbalizes Understanding    Education Comments  No comments found.        Goals:   Encounter Problems       Encounter Problems (Active)       COGNITION/SAFETY       Pt will demonstrate or recall energy conservation/ compensatory techniques to implement during ADLs to increase pt safety when experiencing orthostatic hypotension or syncopal episodes.  (Progressing)       Start:  01/31/25    Expected End:  02/14/25               MOBILITY       Patient will perform Functional mobility Household distances/Community Distances with supervision level of assistance and least restrictive device in order to improve safety and functional mobility. (Progressing)       Start:  01/31/25    Expected End:  02/14/25

## 2025-01-31 NOTE — CARE PLAN
Problem: Mobility  Goal: STG - Patient will ambulate  Description: SBA FOR FWW/CANE AMB(PRN FOR STABILITY) 200 FT  Outcome: Not Progressing  Goal: STG - Patient will ascend and descend four to six stairs  Description: HOMAR SBA  Outcome: Not Progressing  Goal: Goal 1  Description: 20 REPS RROM INCREASING STRENGTH FOR STABLE GAIT  Outcome: Not Progressing

## 2025-01-31 NOTE — PROGRESS NOTES
HPI:  Manish Lance is a 64 y.o. male with history of CAD s/p stent, IDDM type II, moderate aortic stenosis, BPH, and orthostatic hypotension presents with orthostasis and falls. Patient was in his normal state of health until yesterday when he started experiencing orthostasis had 2 falls. Endorses lightheadedness and dizziness prior to falls. The first fall he fell to the floor and he suspects he hit his head but is not sure. The second time his wife was able to lower him to the floor. Also had a fall last week tripping over his dog but this was unrelated. During this fall he suffered from of contusion of his left eye. History of orthostatic hypotension and had an admission in October 2024 for this. At that time, was seen by cardiology and amlodipine and lisinopril were discontinued. Started on midodrine. Patient appears to still be on metoprolol and Flomax. In the ED, was orthostatic despite multiple liters of IV fluids. Admitted to medicine.     Subjective Data:  Feels well. No CP/dyspnea/lightheadedness.  K 3.8, creatinine 1.06. Monitor; SR, no arrhythmias noted. Orthostatic vitals yesterday negative. Will have checked again today.  BB and Flomax held. Florinef added.     Overnight Events:     None    Objective Data:  Last Recorded Vitals:  Vitals:    01/30/25 2021 01/31/25 0023 01/31/25 0443 01/31/25 0900   BP: 157/87 151/78 136/71 (!) 167/94   BP Location: Left arm Left arm Left arm Right arm   Patient Position: Lying Lying Lying Lying   Pulse: 62 56 54 64   Resp: 18 17 17 17   Temp: 36.1 °C (97 °F) 36.3 °C (97.4 °F) 36.6 °C (97.9 °F) 36.4 °C (97.6 °F)   TempSrc: Temporal Temporal Temporal Temporal   SpO2: 95% 96% 94% 96%   Weight:       Height:           Last Labs:    Results from last 7 days   Lab Units 01/31/25  0342 01/30/25  0318   SODIUM mmol/L 143 138   POTASSIUM mmol/L 3.8 4.6   CHLORIDE mmol/L 113* 109*   CO2 mmol/L 24 22   BUN mg/dL 17 25*   CREATININE mg/dL 1.06 1.46*   GLUCOSE mg/dL 164* 169*    CALCIUM mg/dL 8.2* 8.1*        Results from last 7 days   Lab Units 01/31/25  0342 01/30/25  0318   WBC AUTO x10*3/uL 5.4 8.3   HEMOGLOBIN g/dL 12.5* 12.3*   HEMATOCRIT % 39.5* 38.1*   PLATELETS AUTO x10*3/uL 111* 115*               Last I/O:  I/O last 3 completed shifts:  In: 5112.5 (70 mL/kg) [P.O.:1100; I.V.:3012.5 (41.3 mL/kg); IV Piggyback:1000]  Out: 650 (8.9 mL/kg) [Urine:650 (0.2 mL/kg/hr)]  Weight: 73 kg     Past Cardiology Tests (Last 3 Years):  Echo OCTOBER:  CONCLUSIONS:   1. The left ventricular systolic function is normal, with a Nickerson's biplane calculated ejection fraction of 62%.   2. There is normal right ventricular global systolic function.   3. Moderate aortic valve stenosis.   4. Mild aortic valve regurgitation.    Inpatient Medications:  Scheduled medications   Medication Dose Route Frequency    aspirin  81 mg oral Daily    atorvastatin  40 mg oral Nightly    buPROPion XL  150 mg oral q AM    DULoxetine  60 mg oral Daily    empagliflozin  25 mg oral Daily    ezetimibe  10 mg oral Nightly    finasteride  5 mg oral Daily    fludrocortisone  0.1 mg oral Daily    insulin glargine  29 Units subcutaneous q AM    insulin lispro  0-10 Units subcutaneous TID AC    midodrine  5 mg oral TID    pantoprazole  20 mg oral Daily before breakfast    pregabalin  150 mg oral BID     PRN medications   Medication    acetaminophen    Or    acetaminophen    Or    acetaminophen    alum-mag hydroxide-simeth    benzocaine-menthol    dextromethorphan-guaifenesin    dextrose    dextrose    glucagon    glucagon    guaiFENesin    melatonin    ondansetron    Or    ondansetron    polyethylene glycol     Continuous Medications   Medication Dose Last Rate       ROS:  Review of Systems   Constitutional: Negative. Negative for decreased appetite and malaise/fatigue.   HENT: Negative.     Eyes:  Negative for blurred vision and visual disturbance.   Cardiovascular:  Negative for chest pain, dyspnea on exertion, irregular  heartbeat, leg swelling, orthopnea, palpitations and syncope.   Respiratory: Negative.  Negative for cough and shortness of breath.    Musculoskeletal:  Negative for arthritis and falls.   Gastrointestinal: Negative.    Neurological:  Positive for light-headedness (mild positional). Negative for focal weakness.   Psychiatric/Behavioral:  Negative for depression and memory loss.         Physical Exam:  Physical Exam  Constitutional:       Appearance: Normal appearance.   HENT:      Head: Normocephalic.   Eyes:      Conjunctiva/sclera: Conjunctivae normal.   Cardiovascular:      Rate and Rhythm: Normal rate and regular rhythm.      Pulses: Normal pulses.      Heart sounds: S1 normal and S2 normal. Murmur heard.      No friction rub. No gallop.   Pulmonary:      Effort: Pulmonary effort is normal.      Breath sounds: Normal breath sounds.   Abdominal:      General: Bowel sounds are normal.      Palpations: Abdomen is soft.      Tenderness: There is no abdominal tenderness.   Musculoskeletal:      Cervical back: Neck supple.      Right lower leg: No edema.      Left lower leg: No edema.   Skin:     General: Skin is warm and dry.   Neurological:      General: No focal deficit present.      Mental Status: He is alert and oriented to person, place, and time.   Psychiatric:         Attention and Perception: Attention normal.         Mood and Affect: Mood normal.          Assessment/Plan   1) recurrent falls due to orthostatic hypotension  discontinue Metoprolol  Agree with discontinuing Flomax  Start Florinef  Increase PO fluids  1-31-25:  BP is now elevated.  Will keep off of BB and Flomax and continue on Midodrine.  Stopping Florinef: monitor BP.  Orthostatics ordered for today.      2) Unexplained weight loss  Needs GI workup for malabsorption syndrome     3) CAD s/p PCI in past  Stable  1-31-25: No CP/pressure.  Continue on ASA, Zetia and statin.  BB stopped d/t orthostatic hypotension/near syncope.     4) Aortic  stenosis'  Mod AORTIC STENOSIS, mild AI noted on OCT echo.  Will follow serially in the outpt setting.      Code Status:  Full Code        Debby Franklin, APRN-CNP

## 2025-02-01 LAB
ANION GAP SERPL CALC-SCNC: 11 MMOL/L (ref 10–20)
BUN SERPL-MCNC: 15 MG/DL (ref 6–23)
CALCIUM SERPL-MCNC: 8.8 MG/DL (ref 8.6–10.3)
CHLORIDE SERPL-SCNC: 111 MMOL/L (ref 98–107)
CO2 SERPL-SCNC: 25 MMOL/L (ref 21–32)
CREAT SERPL-MCNC: 0.92 MG/DL (ref 0.5–1.3)
EGFRCR SERPLBLD CKD-EPI 2021: >90 ML/MIN/1.73M*2
ERYTHROCYTE [DISTWIDTH] IN BLOOD BY AUTOMATED COUNT: 14.5 % (ref 11.5–14.5)
GLUCOSE BLD MANUAL STRIP-MCNC: 210 MG/DL (ref 74–99)
GLUCOSE BLD MANUAL STRIP-MCNC: 211 MG/DL (ref 74–99)
GLUCOSE BLD MANUAL STRIP-MCNC: 226 MG/DL (ref 74–99)
GLUCOSE BLD MANUAL STRIP-MCNC: 91 MG/DL (ref 74–99)
GLUCOSE SERPL-MCNC: 106 MG/DL (ref 74–99)
HCT VFR BLD AUTO: 41.3 % (ref 41–52)
HGB BLD-MCNC: 13.6 G/DL (ref 13.5–17.5)
MCH RBC QN AUTO: 29.9 PG (ref 26–34)
MCHC RBC AUTO-ENTMCNC: 32.9 G/DL (ref 32–36)
MCV RBC AUTO: 91 FL (ref 80–100)
NRBC BLD-RTO: 0 /100 WBCS (ref 0–0)
PLATELET # BLD AUTO: 109 X10*3/UL (ref 150–450)
POTASSIUM SERPL-SCNC: 3.7 MMOL/L (ref 3.5–5.3)
RBC # BLD AUTO: 4.55 X10*6/UL (ref 4.5–5.9)
SODIUM SERPL-SCNC: 143 MMOL/L (ref 136–145)
WBC # BLD AUTO: 6.7 X10*3/UL (ref 4.4–11.3)

## 2025-02-01 PROCEDURE — 36415 COLL VENOUS BLD VENIPUNCTURE: CPT | Performed by: PHYSICIAN ASSISTANT

## 2025-02-01 PROCEDURE — 2500000002 HC RX 250 W HCPCS SELF ADMINISTERED DRUGS (ALT 637 FOR MEDICARE OP, ALT 636 FOR OP/ED): Performed by: INTERNAL MEDICINE

## 2025-02-01 PROCEDURE — 2500000001 HC RX 250 WO HCPCS SELF ADMINISTERED DRUGS (ALT 637 FOR MEDICARE OP): Performed by: PHYSICIAN ASSISTANT

## 2025-02-01 PROCEDURE — G0378 HOSPITAL OBSERVATION PER HR: HCPCS

## 2025-02-01 PROCEDURE — 2500000002 HC RX 250 W HCPCS SELF ADMINISTERED DRUGS (ALT 637 FOR MEDICARE OP, ALT 636 FOR OP/ED): Performed by: PHYSICIAN ASSISTANT

## 2025-02-01 PROCEDURE — 2500000001 HC RX 250 WO HCPCS SELF ADMINISTERED DRUGS (ALT 637 FOR MEDICARE OP): Performed by: INTERNAL MEDICINE

## 2025-02-01 PROCEDURE — 82947 ASSAY GLUCOSE BLOOD QUANT: CPT | Mod: 59

## 2025-02-01 PROCEDURE — 85027 COMPLETE CBC AUTOMATED: CPT | Performed by: PHYSICIAN ASSISTANT

## 2025-02-01 PROCEDURE — 82653 EL-1 FECAL QUANTITATIVE: CPT | Performed by: INTERNAL MEDICINE

## 2025-02-01 PROCEDURE — 89125 SPECIMEN FAT STAIN: CPT | Performed by: INTERNAL MEDICINE

## 2025-02-01 PROCEDURE — 99231 SBSQ HOSP IP/OBS SF/LOW 25: CPT | Performed by: NURSE PRACTITIONER

## 2025-02-01 PROCEDURE — 82374 ASSAY BLOOD CARBON DIOXIDE: CPT | Performed by: PHYSICIAN ASSISTANT

## 2025-02-01 RX ADMIN — INSULIN LISPRO 4 UNITS: 100 INJECTION, SOLUTION INTRAVENOUS; SUBCUTANEOUS at 17:29

## 2025-02-01 RX ADMIN — INSULIN LISPRO 4 UNITS: 100 INJECTION, SOLUTION INTRAVENOUS; SUBCUTANEOUS at 12:23

## 2025-02-01 RX ADMIN — PREGABALIN 150 MG: 75 CAPSULE ORAL at 08:13

## 2025-02-01 RX ADMIN — ATORVASTATIN CALCIUM 40 MG: 40 TABLET, FILM COATED ORAL at 20:38

## 2025-02-01 RX ADMIN — EZETIMIBE 10 MG: 10 TABLET ORAL at 20:39

## 2025-02-01 RX ADMIN — EMPAGLIFLOZIN 25 MG: 25 TABLET, FILM COATED ORAL at 08:13

## 2025-02-01 RX ADMIN — TICAGRELOR 90 MG: 90 TABLET ORAL at 08:13

## 2025-02-01 RX ADMIN — FINASTERIDE 5 MG: 5 TABLET, FILM COATED ORAL at 08:13

## 2025-02-01 RX ADMIN — BUPROPION HYDROCHLORIDE 150 MG: 150 TABLET, EXTENDED RELEASE ORAL at 08:13

## 2025-02-01 RX ADMIN — MIDODRINE HYDROCHLORIDE 5 MG: 2.5 TABLET ORAL at 08:13

## 2025-02-01 RX ADMIN — INSULIN GLARGINE 29 UNITS: 100 INJECTION, SOLUTION SUBCUTANEOUS at 08:13

## 2025-02-01 RX ADMIN — VENLAFAXINE HYDROCHLORIDE 150 MG: 150 CAPSULE, EXTENDED RELEASE ORAL at 08:13

## 2025-02-01 RX ADMIN — PREGABALIN 150 MG: 75 CAPSULE ORAL at 20:38

## 2025-02-01 RX ADMIN — MIDODRINE HYDROCHLORIDE 5 MG: 2.5 TABLET ORAL at 12:23

## 2025-02-01 RX ADMIN — ASPIRIN 81 MG: 81 TABLET, COATED ORAL at 08:13

## 2025-02-01 RX ADMIN — PANTOPRAZOLE SODIUM 20 MG: 20 TABLET, DELAYED RELEASE ORAL at 06:36

## 2025-02-01 RX ADMIN — TICAGRELOR 90 MG: 90 TABLET ORAL at 20:39

## 2025-02-01 ASSESSMENT — COGNITIVE AND FUNCTIONAL STATUS - GENERAL
WALKING IN HOSPITAL ROOM: A LITTLE
CLIMB 3 TO 5 STEPS WITH RAILING: A LITTLE
MOBILITY SCORE: 22
DAILY ACTIVITIY SCORE: 24

## 2025-02-01 ASSESSMENT — PAIN SCALES - GENERAL
PAINLEVEL_OUTOF10: 0 - NO PAIN
PAINLEVEL_OUTOF10: 0 - NO PAIN

## 2025-02-01 ASSESSMENT — PAIN - FUNCTIONAL ASSESSMENT
PAIN_FUNCTIONAL_ASSESSMENT: 0-10
PAIN_FUNCTIONAL_ASSESSMENT: 0-10

## 2025-02-01 NOTE — PROGRESS NOTES
I met with pt to offer HC based on PT rec ([facility] 18).  Pt declined stating that he didn't feel that he needs 'anything like that'.  He tells me that his feels his mobility is about normal and the provider is adjusting his BP medications and he should be fine.  Plan is for home.

## 2025-02-01 NOTE — CARE PLAN
The patient's goals for the shift include  patient safety     The clinical goals for the shift include maintain patient safety

## 2025-02-01 NOTE — PROGRESS NOTES
HPI:  Manish Lance is a 64 y.o. male with history of CAD s/p stent, IDDM type II, moderate aortic stenosis, BPH, and orthostatic hypotension presents with orthostasis and falls. Patient was in his normal state of health until yesterday when he started experiencing orthostasis had 2 falls. Endorses lightheadedness and dizziness prior to falls. The first fall he fell to the floor and he suspects he hit his head but is not sure. The second time his wife was able to lower him to the floor. Also had a fall last week tripping over his dog but this was unrelated. During this fall he suffered from of contusion of his left eye. History of orthostatic hypotension and had an admission in October 2024 for this. At that time, was seen by cardiology and amlodipine and lisinopril were discontinued. Started on midodrine. Patient appears to still be on metoprolol and Flomax. In the ED, was orthostatic despite multiple liters of IV fluids. Admitted to medicine.    Subjective Data:  Patient has not had recurrent syncopal episodes and denies dizziness or lightheadedness when up.  He still does drop when he stands up from a seated position.  He may benefit from compression stocks.  He is without chest pain or palpitations.  Telemetry shows sinus rhythm to sinus bradycardia lowest heart rates in the 50s.  I do not appreciate any significant pauses on review of telemetry strips.    Overnight Events:    None      Objective Data:  Last Recorded Vitals:  Vitals:    02/01/25 0502 02/01/25 1036 02/01/25 1038 02/01/25 1040   BP: 128/69 (!) 199/92 (!) 187/93 134/83   BP Location:  Left arm Left arm Left arm   Patient Position: Standing Lying Sitting Standing   Pulse: 69 54     Resp:  16     Temp:       TempSrc:       SpO2:  95%     Weight:       Height:           Last Labs:  CBC - 2/1/2025:  6:38 AM  Results from last 7 days   Lab Units 02/01/25  0638 01/31/25  0342 01/30/25  0318   SODIUM mmol/L 143 143 138   POTASSIUM mmol/L 3.7 3.8 4.6    CHLORIDE mmol/L 111* 113* 109*   CO2 mmol/L 25 24 22   BUN mg/dL 15 17 25*   CREATININE mg/dL 0.92 1.06 1.46*   GLUCOSE mg/dL 106* 164* 169*   CALCIUM mg/dL 8.8 8.2* 8.1*      Results from last 7 days   Lab Units 02/01/25  0638 01/31/25  0342 01/30/25  0318   WBC AUTO x10*3/uL 6.7 5.4 8.3   HEMOGLOBIN g/dL 13.6 12.5* 12.3*   HEMATOCRIT % 41.3 39.5* 38.1*   PLATELETS AUTO x10*3/uL 109* 111* 115*      Results from last 7 days   Lab Units 01/30/25  0318   MAGNESIUM mg/dL 1.77      6.7 13.6 109    41.3      CMP - 2/1/2025:  6:38 AM  8.8 5.5 21 --- 0.4   _ 3.4 43 58      PTT - No results in last year.  1.0   11.2 _     TROPHS   Date/Time Value Ref Range Status   01/30/2025 03:18 AM 6 0 - 20 ng/L Final   10/04/2024 11:19 PM <3 0 - 20 ng/L Final   10/04/2024 10:03 PM 3 0 - 20 ng/L Final     BNP   Date/Time Value Ref Range Status   01/30/2025 03:18 AM 27 0 - 99 pg/mL Final   10/04/2024 10:03 PM 10 0 - 99 pg/mL Final     HGBA1C   Date/Time Value Ref Range Status   10/28/2023 05:05 AM 10.3 see below % Final   04/27/2023 05:10 PM 8.4 % Final     Comment:          Diagnosis of Diabetes-Adults   Non-Diabetic: < or = 5.6%   Increased risk for developing diabetes: 5.7-6.4%   Diagnostic of diabetes: > or = 6.5%  .       Monitoring of Diabetes                Age (y)     Therapeutic Goal (%)   Adults:          >18           <7.0   Pediatrics:    13-18           <7.5                   7-12           <8.0                   0- 6            7.5-8.5   American Diabetes Association. Diabetes Care 33(S1), Jan 2010.     02/11/2020 01:15 PM 7.5 % Final     Comment:          Diagnosis of Diabetes-Adults   Non-Diabetic: < or = 5.6%   Increased risk for developing diabetes: 5.7-6.4%   Diagnostic of diabetes: > or = 6.5%  .       Monitoring of Diabetes                Age (y)     Therapeutic Goal (%)   Adults:          >18           <7.0   Pediatrics:    13-18           <7.5                   7-12           <8.0                   0- 6             7.5-8.5   American Diabetes Association. Diabetes Care 33(S1), Jan 2010.       LDLCALC   Date/Time Value Ref Range Status   10/28/2023 05:05 AM 40 <=99 mg/dL Final     Comment:                                 Near   Borderline      AGE      Desirable  Optimal    High     High     Very High     0-19 Y     0 - 109     ---    110-129   >/= 130     ----    20-24 Y     0 - 119     ---    120-159   >/= 160     ----      >24 Y     0 -  99   100-129  130-159   160-189     >/=190       VLDL   Date/Time Value Ref Range Status   10/28/2023 05:05 AM 15 0 - 40 mg/dL Final   05/26/2022 09:10 AM 18 0 - 40 mg/dL Final   03/08/2021 07:51 AM 32 0 - 40 mg/dL Final   01/18/2021 08:26 AM 53 0 - 40 mg/dL Final      Last I/O:  I/O last 3 completed shifts:  In: 2585.4 (35.4 mL/kg) [P.O.:1200; I.V.:1385.4 (19 mL/kg)]  Out: 1450 (19.9 mL/kg) [Urine:1450 (0.6 mL/kg/hr)]  Weight: 73 kg     Past Cardiology Tests (Last 3 Years):  EKG:  ECG 12 lead 01/30/2025 (Preliminary)      ECG 12 Lead       ECG 12 lead 10/04/2024      ECG 12 lead 09/16/2024      ECG 12 lead 08/22/2024      ECG 12 lead 08/19/2024      ECG 12 Lead 12/18/2023    Echo:  Transthoracic Echo (TTE) Complete 10/05/2024      Transthoracic Echo (TTE) Complete 10/28/2023    Ejection Fractions:  EF   Date/Time Value Ref Range Status   10/05/2024 10:57 AM 62 %      Cath:  No results found for this or any previous visit from the past 1095 days.    Stress Test:  No results found for this or any previous visit from the past 1095 days.    Cardiac Imaging:  No results found for this or any previous visit from the past 1095 days.      Inpatient Medications:  Scheduled medications   Medication Dose Route Frequency    aspirin  81 mg oral Daily    atorvastatin  40 mg oral Nightly    buPROPion XL  150 mg oral q AM    empagliflozin  25 mg oral Daily    ezetimibe  10 mg oral Nightly    finasteride  5 mg oral Daily    insulin glargine  29 Units subcutaneous q AM    insulin lispro  0-10 Units  subcutaneous TID AC    midodrine  5 mg oral TID    pantoprazole  20 mg oral Daily before breakfast    pregabalin  150 mg oral BID    ticagrelor  90 mg oral BID    venlafaxine XR  150 mg oral Daily     PRN medications   Medication    acetaminophen    Or    acetaminophen    Or    acetaminophen    alum-mag hydroxide-simeth    benzocaine-menthol    dextromethorphan-guaifenesin    dextrose    dextrose    glucagon    glucagon    guaiFENesin    melatonin    ondansetron    Or    ondansetron    polyethylene glycol     Continuous Medications   Medication Dose Last Rate       Physical Exam:  Alert and oriented x 3  No JVD hepatojugular reflux  Lungs are clear anterior and posteriorly are with good airflow  S1-S2 is regular no murmurs no S3 or S4  Abdomen is soft with normal bowel sounds nontender  No edema in the lower extremities.     Assessment/Plan   1) recurrent falls due to orthostatic hypotension  discontinue Metoprolol  Agree with discontinuing Flomax  Start Florinef  Increase PO fluids  1-31-25:  BP is now elevated.  Will keep off of BB and Flomax and continue on Midodrine.  Stopping Florinef: monitor BP.  Orthostatics ordered for today.   2/1/25 blood pressures are improved with the exception of 2 readings earlier today.  Orthostatics do still show some drop with change in position from seating to standing.  Will order compression socks.  HR 50's.  Review of the telemetry strips does not show any significant pauses.     2) Unexplained weight loss  Needs GI workup for malabsorption syndrome     3) CAD s/p PCI in past  Stable  1-31-25: No CP/pressure.  Continue on ASA, Zetia and statin.  BB stopped d/t orthostatic hypotension/near syncope.   2/1/25:  SB via telemetry.  Will continue to hold the BB.   Remains on ASA/Zetia and statin which he will continue with change.      4) Aortic stenosis'  Mod AORTIC STENOSIS, mild AI noted on OCT echo.  Will follow serially in the outpt setting.  2/1/25  Mean gradient on last  echocardiogram 17 mm hg.   Follow as OP.     Recommendations:  Patient should wear support stockings on in a.m. off in the evening.  Will continue current medications.  Continue to hold beta-blocker with low heart rate.  Possible home with follow-up in cardiology clinic in 1 week so long as his blood pressures remain under 140 systolic.      Code Status:  Full Code    I spent 15 minutes in the professional and overall care of this patient.        Elida Franklin, SULLY-CNP

## 2025-02-02 VITALS
DIASTOLIC BLOOD PRESSURE: 79 MMHG | WEIGHT: 161 LBS | RESPIRATION RATE: 16 BRPM | OXYGEN SATURATION: 95 % | BODY MASS INDEX: 23.05 KG/M2 | TEMPERATURE: 98.4 F | HEART RATE: 78 BPM | SYSTOLIC BLOOD PRESSURE: 135 MMHG | HEIGHT: 70 IN

## 2025-02-02 PROBLEM — I95.1 ORTHOSTASIS: Status: RESOLVED | Noted: 2025-01-30 | Resolved: 2025-02-02

## 2025-02-02 LAB
ANION GAP SERPL CALC-SCNC: 9 MMOL/L (ref 10–20)
BUN SERPL-MCNC: 17 MG/DL (ref 6–23)
CALCIUM SERPL-MCNC: 8.7 MG/DL (ref 8.6–10.3)
CHLORIDE SERPL-SCNC: 108 MMOL/L (ref 98–107)
CO2 SERPL-SCNC: 27 MMOL/L (ref 21–32)
COPPER SERPL-MCNC: 60.5 UG/DL (ref 70–140)
CREAT SERPL-MCNC: 1 MG/DL (ref 0.5–1.3)
EGFRCR SERPLBLD CKD-EPI 2021: 84 ML/MIN/1.73M*2
ERYTHROCYTE [DISTWIDTH] IN BLOOD BY AUTOMATED COUNT: 14.4 % (ref 11.5–14.5)
GLUCOSE BLD MANUAL STRIP-MCNC: 131 MG/DL (ref 74–99)
GLUCOSE BLD MANUAL STRIP-MCNC: 321 MG/DL (ref 74–99)
GLUCOSE SERPL-MCNC: 165 MG/DL (ref 74–99)
HCT VFR BLD AUTO: 42.6 % (ref 41–52)
HGB BLD-MCNC: 14 G/DL (ref 13.5–17.5)
MCH RBC QN AUTO: 29.1 PG (ref 26–34)
MCHC RBC AUTO-ENTMCNC: 32.9 G/DL (ref 32–36)
MCV RBC AUTO: 89 FL (ref 80–100)
NRBC BLD-RTO: 0 /100 WBCS (ref 0–0)
PLATELET # BLD AUTO: 117 X10*3/UL (ref 150–450)
POTASSIUM SERPL-SCNC: 3.9 MMOL/L (ref 3.5–5.3)
RBC # BLD AUTO: 4.81 X10*6/UL (ref 4.5–5.9)
SODIUM SERPL-SCNC: 140 MMOL/L (ref 136–145)
WBC # BLD AUTO: 6.3 X10*3/UL (ref 4.4–11.3)

## 2025-02-02 PROCEDURE — 97110 THERAPEUTIC EXERCISES: CPT | Mod: CQ,GP | Performed by: PHYSICAL THERAPY ASSISTANT

## 2025-02-02 PROCEDURE — 2500000002 HC RX 250 W HCPCS SELF ADMINISTERED DRUGS (ALT 637 FOR MEDICARE OP, ALT 636 FOR OP/ED): Performed by: PHYSICIAN ASSISTANT

## 2025-02-02 PROCEDURE — 2500000001 HC RX 250 WO HCPCS SELF ADMINISTERED DRUGS (ALT 637 FOR MEDICARE OP): Performed by: PHYSICIAN ASSISTANT

## 2025-02-02 PROCEDURE — 97116 GAIT TRAINING THERAPY: CPT | Mod: GP,CQ | Performed by: PHYSICAL THERAPY ASSISTANT

## 2025-02-02 PROCEDURE — 80048 BASIC METABOLIC PNL TOTAL CA: CPT | Performed by: PHYSICIAN ASSISTANT

## 2025-02-02 PROCEDURE — 99239 HOSP IP/OBS DSCHRG MGMT >30: CPT | Performed by: INTERNAL MEDICINE

## 2025-02-02 PROCEDURE — 97530 THERAPEUTIC ACTIVITIES: CPT | Mod: GP,CQ | Performed by: PHYSICAL THERAPY ASSISTANT

## 2025-02-02 PROCEDURE — G0378 HOSPITAL OBSERVATION PER HR: HCPCS

## 2025-02-02 PROCEDURE — 2500000002 HC RX 250 W HCPCS SELF ADMINISTERED DRUGS (ALT 637 FOR MEDICARE OP, ALT 636 FOR OP/ED): Performed by: INTERNAL MEDICINE

## 2025-02-02 PROCEDURE — 82947 ASSAY GLUCOSE BLOOD QUANT: CPT

## 2025-02-02 PROCEDURE — 2500000001 HC RX 250 WO HCPCS SELF ADMINISTERED DRUGS (ALT 637 FOR MEDICARE OP): Performed by: INTERNAL MEDICINE

## 2025-02-02 PROCEDURE — 36415 COLL VENOUS BLD VENIPUNCTURE: CPT | Performed by: PHYSICIAN ASSISTANT

## 2025-02-02 PROCEDURE — 85027 COMPLETE CBC AUTOMATED: CPT | Performed by: PHYSICIAN ASSISTANT

## 2025-02-02 PROCEDURE — 99231 SBSQ HOSP IP/OBS SF/LOW 25: CPT | Performed by: NURSE PRACTITIONER

## 2025-02-02 RX ORDER — MIDODRINE HYDROCHLORIDE 2.5 MG/1
2.5 TABLET ORAL
Status: DISCONTINUED | OUTPATIENT
Start: 2025-02-02 | End: 2025-02-02 | Stop reason: HOSPADM

## 2025-02-02 RX ORDER — METOPROLOL TARTRATE 25 MG/1
12.5 TABLET, FILM COATED ORAL 2 TIMES DAILY
Start: 2025-02-02

## 2025-02-02 RX ORDER — FINASTERIDE 5 MG/1
5 TABLET, FILM COATED ORAL DAILY
Qty: 30 TABLET | Refills: 11 | Status: SHIPPED | OUTPATIENT
Start: 2025-02-03 | End: 2026-02-03

## 2025-02-02 RX ADMIN — FINASTERIDE 5 MG: 5 TABLET, FILM COATED ORAL at 08:37

## 2025-02-02 RX ADMIN — BUPROPION HYDROCHLORIDE 150 MG: 150 TABLET, EXTENDED RELEASE ORAL at 08:37

## 2025-02-02 RX ADMIN — TICAGRELOR 90 MG: 90 TABLET ORAL at 08:37

## 2025-02-02 RX ADMIN — ASPIRIN 81 MG: 81 TABLET, COATED ORAL at 08:37

## 2025-02-02 RX ADMIN — PREGABALIN 150 MG: 75 CAPSULE ORAL at 08:37

## 2025-02-02 RX ADMIN — VENLAFAXINE HYDROCHLORIDE 150 MG: 150 CAPSULE, EXTENDED RELEASE ORAL at 08:37

## 2025-02-02 RX ADMIN — EMPAGLIFLOZIN 25 MG: 25 TABLET, FILM COATED ORAL at 08:37

## 2025-02-02 RX ADMIN — PANTOPRAZOLE SODIUM 20 MG: 20 TABLET, DELAYED RELEASE ORAL at 05:34

## 2025-02-02 RX ADMIN — INSULIN GLARGINE 29 UNITS: 100 INJECTION, SOLUTION SUBCUTANEOUS at 08:39

## 2025-02-02 RX ADMIN — INSULIN LISPRO 8 UNITS: 100 INJECTION, SOLUTION INTRAVENOUS; SUBCUTANEOUS at 12:49

## 2025-02-02 RX ADMIN — MIDODRINE HYDROCHLORIDE 5 MG: 2.5 TABLET ORAL at 12:03

## 2025-02-02 RX ADMIN — MIDODRINE HYDROCHLORIDE 5 MG: 2.5 TABLET ORAL at 08:37

## 2025-02-02 ASSESSMENT — COGNITIVE AND FUNCTIONAL STATUS - GENERAL
WALKING IN HOSPITAL ROOM: A LITTLE
CLIMB 3 TO 5 STEPS WITH RAILING: A LITTLE
MOBILITY SCORE: 22
CLIMB 3 TO 5 STEPS WITH RAILING: A LITTLE
CLIMB 3 TO 5 STEPS WITH RAILING: TOTAL
DAILY ACTIVITIY SCORE: 24
MOBILITY SCORE: 20
WALKING IN HOSPITAL ROOM: A LITTLE
DAILY ACTIVITIY SCORE: 24
MOBILITY SCORE: 22
WALKING IN HOSPITAL ROOM: A LITTLE

## 2025-02-02 ASSESSMENT — PAIN - FUNCTIONAL ASSESSMENT
PAIN_FUNCTIONAL_ASSESSMENT: 0-10
PAIN_FUNCTIONAL_ASSESSMENT: 0-10

## 2025-02-02 ASSESSMENT — PAIN SCALES - GENERAL
PAINLEVEL_OUTOF10: 0 - NO PAIN

## 2025-02-02 NOTE — PROGRESS NOTES
HPI:  Manish Lance is a 64 y.o. male with history of CAD s/p stent, IDDM type II, moderate aortic stenosis, BPH, and orthostatic hypotension presents with orthostasis and falls. Patient was in his normal state of health until yesterday when he started experiencing orthostasis had 2 falls. Endorses lightheadedness and dizziness prior to falls. The first fall he fell to the floor and he suspects he hit his head but is not sure. The second time his wife was able to lower him to the floor. Also had a fall last week tripping over his dog but this was unrelated. During this fall he suffered from of contusion of his left eye. History of orthostatic hypotension and had an admission in October 2024 for this. At that time, was seen by cardiology and amlodipine and lisinopril were discontinued. Started on midodrine. Patient appears to still be on metoprolol and Flomax. In the ED, was orthostatic despite multiple liters of IV fluids. Admitted to medicine.   Subjective Data:  2/1/25: Patient has not had recurrent syncopal episodes and denies dizziness or lightheadedness when up. He still does drop when he stands up from a seated position. He may benefit from compression stocks. He is without chest pain or palpitations. Telemetry shows sinus rhythm to sinus bradycardia lowest heart rates in the 50s. I do not appreciate any significant pauses on review of telemetry strips.   2/2/25: Patient is feeling well without chest pain or palpitations.  He has been up in the room without dizziness.  His blood pressures have been good in fact they are starting to run on the higher side.  Most recent blood pressure to day 144/83.  I am going to cut down the midodrine to 2.5 mg 3 times a day.  Heart rate has still been somewhat bradycardic in the 50s to 60s.  Again no significant pauses.    Overnight Events:    BP has improved.      Objective Data:  Last Recorded Vitals:  Vitals:    02/02/25 0500 02/02/25 0825 02/02/25 0827 02/02/25 0829    BP: 159/86 167/88 147/87 144/83   BP Location: Right arm Right arm Right arm Right arm   Patient Position: Lying Lying Sitting Standing   Pulse: 58 60 72 85   Resp: 17 18 18 18   Temp: 36.3 °C (97.3 °F) 36.9 °C (98.5 °F)     TempSrc: Temporal Temporal     SpO2: 96% 98%     Weight:       Height:           Last Labs:  Results from last 7 days   Lab Units 02/02/25  0537 02/01/25  0638 01/31/25  0342   SODIUM mmol/L 140 143 143   POTASSIUM mmol/L 3.9 3.7 3.8   CHLORIDE mmol/L 108* 111* 113*   CO2 mmol/L 27 25 24   BUN mg/dL 17 15 17   CREATININE mg/dL 1.00 0.92 1.06   GLUCOSE mg/dL 165* 106* 164*   CALCIUM mg/dL 8.7 8.8 8.2*      Results from last 7 days   Lab Units 02/02/25  0537 02/01/25  0638 01/31/25  0342   WBC AUTO x10*3/uL 6.3 6.7 5.4   HEMOGLOBIN g/dL 14.0 13.6 12.5*   HEMATOCRIT % 42.6 41.3 39.5*   PLATELETS AUTO x10*3/uL 117* 109* 111*      Results from last 7 days   Lab Units 01/30/25  0318   MAGNESIUM mg/dL 1.77      CBC - 2/2/2025:  5:37 AM  6.3 14.0 117    42.6      CMP - 2/2/2025:  5:37 AM  8.7 5.5 21 --- 0.4   _ 3.4 43 58      PTT - No results in last year.  1.0   11.2 _     TROPHS   Date/Time Value Ref Range Status   01/30/2025 03:18 AM 6 0 - 20 ng/L Final   10/04/2024 11:19 PM <3 0 - 20 ng/L Final   10/04/2024 10:03 PM 3 0 - 20 ng/L Final     BNP   Date/Time Value Ref Range Status   01/30/2025 03:18 AM 27 0 - 99 pg/mL Final   10/04/2024 10:03 PM 10 0 - 99 pg/mL Final     HGBA1C   Date/Time Value Ref Range Status   10/28/2023 05:05 AM 10.3 see below % Final   04/27/2023 05:10 PM 8.4 % Final     Comment:          Diagnosis of Diabetes-Adults   Non-Diabetic: < or = 5.6%   Increased risk for developing diabetes: 5.7-6.4%   Diagnostic of diabetes: > or = 6.5%  .       Monitoring of Diabetes                Age (y)     Therapeutic Goal (%)   Adults:          >18           <7.0   Pediatrics:    13-18           <7.5                   7-12           <8.0                   0- 6            7.5-8.5   American  Diabetes Association. Diabetes Care 33(S1), Jan 2010.     02/11/2020 01:15 PM 7.5 % Final     Comment:          Diagnosis of Diabetes-Adults   Non-Diabetic: < or = 5.6%   Increased risk for developing diabetes: 5.7-6.4%   Diagnostic of diabetes: > or = 6.5%  .       Monitoring of Diabetes                Age (y)     Therapeutic Goal (%)   Adults:          >18           <7.0   Pediatrics:    13-18           <7.5                   7-12           <8.0                   0- 6            7.5-8.5   American Diabetes Association. Diabetes Care 33(S1), Jan 2010.       LDLCALC   Date/Time Value Ref Range Status   10/28/2023 05:05 AM 40 <=99 mg/dL Final     Comment:                                 Near   Borderline      AGE      Desirable  Optimal    High     High     Very High     0-19 Y     0 - 109     ---    110-129   >/= 130     ----    20-24 Y     0 - 119     ---    120-159   >/= 160     ----      >24 Y     0 -  99   100-129  130-159   160-189     >/=190       VLDL   Date/Time Value Ref Range Status   10/28/2023 05:05 AM 15 0 - 40 mg/dL Final   05/26/2022 09:10 AM 18 0 - 40 mg/dL Final   03/08/2021 07:51 AM 32 0 - 40 mg/dL Final   01/18/2021 08:26 AM 53 0 - 40 mg/dL Final      Last I/O:  I/O last 3 completed shifts:  In: 360 (4.9 mL/kg) [P.O.:360]  Out: 1200 (16.4 mL/kg) [Urine:1200 (0.5 mL/kg/hr)]  Weight: 73 kg     Past Cardiology Tests (Last 3 Years):  EKG:  ECG 12 lead 01/30/2025 (Preliminary)      ECG 12 Lead       ECG 12 lead 10/04/2024      ECG 12 lead 09/16/2024      ECG 12 lead 08/22/2024      ECG 12 lead 08/19/2024      ECG 12 Lead 12/18/2023    Echo:  Transthoracic Echo (TTE) Complete 10/05/2024      Transthoracic Echo (TTE) Complete 10/28/2023    Ejection Fractions:  EF   Date/Time Value Ref Range Status   10/05/2024 10:57 AM 62 %      Cath:  No results found for this or any previous visit from the past 1095 days.    Stress Test:  No results found for this or any previous visit from the past 1095  days.    Cardiac Imaging:  No results found for this or any previous visit from the past 1095 days.      Inpatient Medications:  Scheduled medications   Medication Dose Route Frequency    aspirin  81 mg oral Daily    atorvastatin  40 mg oral Nightly    buPROPion XL  150 mg oral q AM    empagliflozin  25 mg oral Daily    ezetimibe  10 mg oral Nightly    finasteride  5 mg oral Daily    insulin glargine  29 Units subcutaneous q AM    insulin lispro  0-10 Units subcutaneous TID AC    midodrine  5 mg oral TID    pantoprazole  20 mg oral Daily before breakfast    pregabalin  150 mg oral BID    ticagrelor  90 mg oral BID    venlafaxine XR  150 mg oral Daily     PRN medications   Medication    acetaminophen    Or    acetaminophen    Or    acetaminophen    alum-mag hydroxide-simeth    benzocaine-menthol    dextromethorphan-guaifenesin    dextrose    dextrose    glucagon    glucagon    guaiFENesin    melatonin    ondansetron    Or    ondansetron    polyethylene glycol     Continuous Medications   Medication Dose Last Rate       Physical Exam:  ***     Assessment/Plan   1) recurrent falls due to orthostatic hypotension  discontinue Metoprolol  Agree with discontinuing Flomax  Start Florinef  Increase PO fluids  1-31-25:  BP is now elevated.  Will keep off of BB and Flomax and continue on Midodrine.  Stopping Florinef: monitor BP.  Orthostatics ordered for today.   2/1/25 blood pressures are improved with the exception of 2 readings earlier today.  Orthostatics do still show some drop with change in position from seating to standing.  Will order compression socks.  HR 50's.  Review of the telemetry strips does not show any significant pauses.  2/2/25: No dizziness or lightheadedness while up today.  Heart rate still ranging 50s to 60s without pauses.  Pressure has improved and in fact is running a little on the high side.  Will decrease the midodrine to 2.5 mg 3 times a day for discharge.  He will log blood pressures at home  and bring to his follow-up visit in cardiology clinic in 1 to 2 weeks.  If at that point he is doing well can consider discontinueing the midodrine altogether.     2) Unexplained weight loss  Needs GI workup for malabsorption syndrome     3) CAD s/p PCI in past  Stable  1-31-25: No CP/pressure.  Continue on ASA, Zetia and statin.  BB stopped d/t orthostatic hypotension/near syncope.   2/1/25:  SB via telemetry.  Will continue to hold the BB.   Remains on ASA/Zetia and statin which he will continue with change.   2/2/25: No chest pain or palpitations.  Will continue the aspirin setting and statin medication.  Will continue to hold beta-blocker.     4) Aortic stenosis'  Mod AORTIC STENOSIS, mild AI noted on OCT echo.  Will follow serially in the outpt setting.  2/1/25  Mean gradient on last echocardiogram 17 mm hg.   Follow as OP.      Recommendations:  Patient should wear support stockings on in a.m. off in the evening.  Will continue current medications.  Continue to hold beta-blocker with low heart rate.  Continue the ASA statin and zetia.   Home with midodrine 2.5 mg three times a day.    BP check daily at home and log.   Follow up in cardiology clinic. 1-2 weeks.         Code Status:  Full Code    I spent *** minutes in the professional and overall care of this patient.        Elida Franklin, SULLY-CNP

## 2025-02-02 NOTE — PROGRESS NOTES
SW following up with pt for discharge planning. SW met with pt and/or family to assess needs and provide support, introduced self and my role as  with care transition team. Pt not interested in [facility] and plans to return when medically ready for discharge. Pt requested cane at discharge and medications be sent to [facility] in [place]. SW updated provider and will ask PT to deliver cane to room once order placed. No other needs identified at this time, SW signing off,  pt and care team aware of SW availability while inpt.    MARIAH Wiley ([id number])   Care Transitions

## 2025-02-02 NOTE — DISCHARGE SUMMARY
DISCHARGE SUMMARY     Discharge Diagnosis  Orthostasis    This discharge took greater than 35 minutes.    Test Results Pending At Discharge  Pending Labs       Order Current Status    Copper, serum In process    Fecal fat, qualitative In process    Pancreatic elastase, fecal In process            Hospital Course   Manish Lance is a 64 y.o. with history of CAD s/p stent, IDDM type II, moderate aortic stenosis, BPH, and orthostatic hypotension presents with orthostasis and falls.  Patient was in his normal state of health until yesterday when he started experiencing orthostasis had 2 falls.  Endorses lightheadedness and dizziness prior to falls.  The first fall he fell to the floor and he suspects he hit his head but is not sure.  The second time his wife was able to lower him to the floor.  Also had a fall last week tripping over his dog but this was unrelated.  During this fall he suffered from of contusion of his left eye.  History of orthostatic hypotension and had an admission in October 2024 for this.  At that time, was seen by cardiology and amlodipine and lisinopril were discontinued.  Started on midodrine.  Patient appears to still be on metoprolol and Flomax.  In the ED, was orthostatic despite multiple liters of IV fluids.  Admitted to medicine.     Orthostatic Hypotension  Last adm, amlodipine and lisinopril were stopped, started on midodrine 2.5mg TID  Still on metoprolol and Flomax -- discontinued here  Start finasteride in place of Flomax  Cardiology consultation -- given florinef, now d/c'd due to elevated BP  PT/OT pending rec [facility] he declined services  On the morning of 2/2 he no longer had dizziness, VS were technically still orthostatic but he was asymptomatic      Moderate Aortic Stenosis     Acute Renal Failure  Resolved     Unexplained weight loss  Consult to GI -- No additional work up recommended at this time     Concussion  Wife says that memory is poor since recent falls     Other  Issues  CAD s/p stent: holding home metoprolol as above  IDDM type II: home meds  BPH: Exchanged Flomax for finasteride    Pertinent Physical Exam At Time of Discharge  General: well appearing, cooperative with exam, in NAD.  HEENT: Contusion of left eye.  Lymph: No cervical or inguinal lymphadenopathy.  Cardiac: RRR.  Harsh holosystolic murmur.  Lungs: CTAB. Nl WOB.  Abd: Non-tender. No rebound or gaurding. Nl bowel sounds.  Ext: No edema. 2+ pulses.  Skin: No rashes, abrasions, or contusions.  Psych: A&Ox3. Nl affect.  Neuro: 5/5 strength. Sensation intact.    Home Medications     Medication List      START taking these medications     finasteride 5 mg tablet; Commonly known as: Proscar; Take 1 tablet (5   mg) by mouth once daily. Do not crush, chew, or split.; Start taking on:   February 3, 2025     CHANGE how you take these medications     metoprolol tartrate 25 mg tablet; Commonly known as: Lopressor; Take 0.5   tablets (12.5 mg) by mouth 2 times a day. Please do not restart this   medication until instructed to do so by your cardiologist; What changed:   additional instructions     CONTINUE taking these medications     allopurinol 300 mg tablet; Commonly known as: Zyloprim   aspirin 81 mg EC tablet   aspirin-acetaminophen-caffeine 250-250-65 mg tablet; Commonly known as:   Excedrin Migraine   atorvastatin 40 mg tablet; Commonly known as: Lipitor   buPROPion  mg 24 hr tablet; Commonly known as: Wellbutrin XL   cholecalciferol 50 MCG (2000 UT) tablet; Commonly known as: Vitamin D-3   ezetimibe 10 mg tablet; Commonly known as: Zetia   ferrous sulfate 325 (65 Fe) MG EC tablet   fluticasone 50 mcg/actuation nasal spray; Commonly known as: Flonase   Jardiance 25 mg; Generic drug: empagliflozin   Lantus U-100 Insulin 100 unit/mL injection; Generic drug: insulin   glargine   metFORMIN 1,000 mg tablet; Commonly known as: Glucophage   midodrine 2.5 mg tablet; Commonly known as: Proamatine; Take 1 tablet   (2.5 mg)  by mouth 3 times daily (morning, midday, late afternoon).   naproxen 375 mg tablet; Commonly known as: Naprosyn   nitroglycerin 0.4 mg SL tablet; Commonly known as: Nitrostat   omeprazole 20 mg DR capsule; Commonly known as: PriLOSEC   pregabalin 300 mg capsule; Commonly known as: Lyrica   ProAir HFA 90 mcg/actuation inhaler; Generic drug: albuterol   ticagrelor 90 mg tablet; Commonly known as: Brilinta   venlafaxine  mg 24 hr capsule; Commonly known as: Effexor-XR     STOP taking these medications     Flomax 0.4 mg 24 hr capsule; Generic drug: tamsulosin       Outpatient Follow-Up  No follow-ups on file.     Panchito Ochoa MD PhD  2/2/2025  12:51 PM

## 2025-02-02 NOTE — CARE PLAN
Problem: Pain - Adult  Goal: Verbalizes/displays adequate comfort level or baseline comfort level  Outcome: Progressing     Problem: Safety - Adult  Goal: Free from fall injury  Outcome: Progressing     Problem: Discharge Planning  Goal: Discharge to home or other facility with appropriate resources  Outcome: Progressing     Problem: Chronic Conditions and Co-morbidities  Goal: Patient's chronic conditions and co-morbidity symptoms are monitored and maintained or improved  Outcome: Progressing     Problem: Nutrition  Goal: Nutrient intake appropriate for maintaining nutritional needs  Outcome: Progressing     Problem: Diabetes  Goal: Achieve decreasing blood glucose levels by end of shift  Outcome: Progressing  Goal: Increase stability of blood glucose readings by end of shift  Outcome: Progressing  Goal: Decrease in ketones present in urine by end of shift  Outcome: Progressing  Goal: Maintain electrolyte levels within acceptable range throughout shift  Outcome: Progressing  Goal: Maintain glucose levels >70mg/dl to <250mg/dl throughout shift  Outcome: Progressing  Goal: No changes in neurological exam by end of shift  Outcome: Progressing  Goal: Learn about and adhere to nutrition recommendations by end of shift  Outcome: Progressing  Goal: Vital signs within normal range for age by end of shift  Outcome: Progressing  Goal: Increase self care and/or family involovement by end of shift  Outcome: Progressing  Goal: Receive DSME education by end of shift  Outcome: Progressing       The clinical goals for the shift include Patient will have no falls or injuries throughout this shift.

## 2025-02-02 NOTE — PROGRESS NOTES
Physical Therapy    Physical Therapy Treatment    Patient Name: Manish Lance  MRN: [id number]  Department: [address]  Room: [address] [place] [address]  Today's Date: 2/2/2025  Time Calculation  Start Time: 1017  Stop Time: 1057  Time Calculation (min): 40 min         Assessment/Plan   PT Assessment  PT Assessment Results: Decreased strength, Impaired balance, Decreased endurance  Rehab Prognosis: Good  Barriers to Discharge Home: Physical needs  End of Session Communication: Bedside nurse  Assessment Comment:  (Pt without c/o during session. Educated for static balance prior to initiate gait traiing. ortho monitored during session.)  End of Session Patient Position: Alarm off, not on at start of session, Bed, 3 rail up     PT Plan  Treatment/Interventions: Transfer training, Gait training, Stair training, Endurance training, Strengthening  PT Plan: Ongoing PT  PT Frequency: 4 times per week  PT Discharge Recommendations: Low intensity level of continued care  Equipment Recommended upon Discharge:  (TBD)  PT Recommended Transfer Status: Assist x1, Stand by assist (CANE/FWW PRN FOR STABILTIY)  PT - OK to Discharge: Yes (WHENMEDICALLY CLEARED)      General Visit Information:   PT  Visit  PT Received On: 02/02/25  Response to Previous Treatment: Patient with no complaints from previous session.  General  Family/Caregiver Present: Yes  Caregiver Feedback:  (wife present)  Prior to Session Communication: Bedside nurse  Patient Position Received:  (up in bathroom)  General Comment:  (2322 ok to tx and Pt agreeable)    Subjective   Precautions:  Precautions  Precautions Comment:  (orthostatic)     Date/Time Vitals Session Patient Position Pulse Resp SpO2 BP MAP (mmHg)    02/02/25 1017 --  Sitting  --  --  --  --  --           Vital Signs Comment:  (sitting 171/89, standing 131/80, post gait 156/89. sitting 137/90, [id number], post gait 150/90)     Objective   Pain:  Pain Assessment  0-10 (Numeric) Pain Score: 0 - No  pain  Cognition:  Cognition  Orientation Level: Oriented X4                     Treatments:  Therapeutic Exercise  Therapeutic Exercise Performed:  (seated LE ther ex 15reps all planes and motion)    Ambulation/Gait Training 1  Surface 1: Level tile  Device 1: No device  Assistance 1: Contact guard, Close supervision  Quality of Gait 1: Decreased step length, Narrow base of support  Comments/Distance (ft) 1:  (125x2. No LOB noted with seated rest break between bouts.)  Transfer 1  Transfer From 1: Sit to, Stand to  Transfer to 1: Sit, Stand  Transfer Device 1:  (no AD)  Transfer Level of Assistance 1: Close supervision    Outcome Measures:  [facility] Basic Mobility  Turning from your back to your side while in a flat bed without using bedrails: None  Moving from lying on your back to sitting on the side of a flat bed without using bedrails: None  Moving to and from bed to chair (including a wheelchair): None  Standing up from a chair using your arms (e.g. wheelchair or bedside chair): None  To walk in hospital room: A little  Climbing 3-5 steps with railing: Total  Basic Mobility - Total Score: 20    Education Documentation  Mobility Training, taught by Ellen Estrada PTA at 2/2/2025 11:08 AM.  Learner: Patient  Readiness: Acceptance  Method: Explanation  Response: Verbalizes Understanding    Education Comments  No comments found.        OP EDUCATION:       Encounter Problems       Encounter Problems (Active)       Mobility       STG - Patient will ambulate (Progressing)       Start:  01/31/25    Expected End:  02/07/25       SBA FOR FWW/CANE AMB(PRN FOR STABILITY) 200 FT         STG - Patient will ascend and descend four to six stairs (Not Progressing)       Start:  01/31/25    Expected End:  02/08/25       RAILS SBA         Goal 1 (Progressing)       Start:  01/31/25    Expected End:  02/14/25       20 REPS RROM INCREASING STRENGTH FOR STABLE GAIT            Pain - Adult

## 2025-02-02 NOTE — CARE PLAN
The patient's goals for the shift include      Problem: Pain - Adult  Goal: Verbalizes/displays adequate comfort level or baseline comfort level  Outcome: Progressing     Problem: Safety - Adult  Goal: Free from fall injury  Outcome: Progressing     Problem: Chronic Conditions and Co-morbidities  Goal: Patient's chronic conditions and co-morbidity symptoms are monitored and maintained or improved  Outcome: Progressing     Problem: Nutrition  Goal: Nutrient intake appropriate for maintaining nutritional needs  Outcome: Progressing     Problem: Discharge Planning  Goal: Discharge to home or other facility with appropriate resources  Outcome: Progressing     The clinical goals for the shift include Maintain pt safety/comfort; monitor labs (blood pressure)/vitals; remain free from falls/injury

## 2025-02-06 LAB
ELASTASE PANC STL-MCNT: 574 UG/G
FAT STL QL: NORMAL
NEUTRAL FAT STL QL: NORMAL

## 2025-02-25 LAB
ATRIAL RATE: 60 BPM
P AXIS: 61 DEGREES
PR INTERVAL: 176 MS
Q ONSET: 251 MS
QRS COUNT: 9 BEATS
QRS DURATION: 96 MS
QT INTERVAL: 427 MS
QTC CALCULATION(BAZETT): 427 MS
QTC FREDERICIA: 427 MS
R AXIS: 25 DEGREES
T AXIS: 30 DEGREES
T OFFSET: 464 MS
VENTRICULAR RATE: 60 BPM

## 2025-04-13 ENCOUNTER — HOSPITAL ENCOUNTER (OUTPATIENT)
Facility: HOSPITAL | Age: 65
Setting detail: OBSERVATION
Discharge: HOME | End: 2025-04-15
Attending: EMERGENCY MEDICINE | Admitting: INTERNAL MEDICINE
Payer: OTHER GOVERNMENT

## 2025-04-13 ENCOUNTER — APPOINTMENT (OUTPATIENT)
Dept: CARDIOLOGY | Facility: HOSPITAL | Age: 65
End: 2025-04-13
Payer: OTHER GOVERNMENT

## 2025-04-13 ENCOUNTER — APPOINTMENT (OUTPATIENT)
Dept: RADIOLOGY | Facility: HOSPITAL | Age: 65
End: 2025-04-13
Payer: OTHER GOVERNMENT

## 2025-04-13 DIAGNOSIS — R07.2 SUBSTERNAL CHEST PAIN: ICD-10-CM

## 2025-04-13 DIAGNOSIS — R07.9 CHEST PAIN, UNSPECIFIED TYPE: Primary | ICD-10-CM

## 2025-04-13 DIAGNOSIS — Z98.61 S/P PTCA (PERCUTANEOUS TRANSLUMINAL CORONARY ANGIOPLASTY): ICD-10-CM

## 2025-04-13 DIAGNOSIS — N17.9 ACUTE KIDNEY INJURY: ICD-10-CM

## 2025-04-13 LAB
ALBUMIN SERPL BCP-MCNC: 4.1 G/DL (ref 3.4–5)
ALP SERPL-CCNC: 82 U/L (ref 33–136)
ALT SERPL W P-5'-P-CCNC: 36 U/L (ref 10–52)
ANION GAP SERPL CALC-SCNC: 17 MMOL/L (ref 10–20)
APPEARANCE UR: CLEAR
AST SERPL W P-5'-P-CCNC: 22 U/L (ref 9–39)
BASOPHILS # BLD AUTO: 0.06 X10*3/UL (ref 0–0.1)
BASOPHILS NFR BLD AUTO: 0.8 %
BILIRUB SERPL-MCNC: 0.3 MG/DL (ref 0–1.2)
BILIRUB UR STRIP.AUTO-MCNC: NEGATIVE MG/DL
BNP SERPL-MCNC: 53 PG/ML (ref 0–99)
BUN SERPL-MCNC: 23 MG/DL (ref 6–23)
CALCIUM SERPL-MCNC: 8.7 MG/DL (ref 8.6–10.3)
CARDIAC TROPONIN I PNL SERPL HS: 4 NG/L (ref 0–20)
CHLORIDE SERPL-SCNC: 100 MMOL/L (ref 98–107)
CO2 SERPL-SCNC: 22 MMOL/L (ref 21–32)
COLOR UR: ABNORMAL
CREAT SERPL-MCNC: 1.69 MG/DL (ref 0.5–1.3)
EGFRCR SERPLBLD CKD-EPI 2021: 45 ML/MIN/1.73M*2
EOSINOPHIL # BLD AUTO: 0.61 X10*3/UL (ref 0–0.7)
EOSINOPHIL NFR BLD AUTO: 7.7 %
ERYTHROCYTE [DISTWIDTH] IN BLOOD BY AUTOMATED COUNT: 14.4 % (ref 11.5–14.5)
GLUCOSE BLD MANUAL STRIP-MCNC: 226 MG/DL (ref 74–99)
GLUCOSE SERPL-MCNC: 217 MG/DL (ref 74–99)
GLUCOSE UR STRIP.AUTO-MCNC: ABNORMAL MG/DL
HCT VFR BLD AUTO: 41.3 % (ref 41–52)
HGB BLD-MCNC: 13.7 G/DL (ref 13.5–17.5)
IMM GRANULOCYTES # BLD AUTO: 0.02 X10*3/UL (ref 0–0.7)
IMM GRANULOCYTES NFR BLD AUTO: 0.3 % (ref 0–0.9)
INR PPP: 0.9 (ref 0.9–1.1)
KETONES UR STRIP.AUTO-MCNC: NEGATIVE MG/DL
LEUKOCYTE ESTERASE UR QL STRIP.AUTO: NEGATIVE
LYMPHOCYTES # BLD AUTO: 2.01 X10*3/UL (ref 1.2–4.8)
LYMPHOCYTES NFR BLD AUTO: 25.3 %
MAGNESIUM SERPL-MCNC: 1.99 MG/DL (ref 1.6–2.4)
MCH RBC QN AUTO: 28.8 PG (ref 26–34)
MCHC RBC AUTO-ENTMCNC: 33.2 G/DL (ref 32–36)
MCV RBC AUTO: 87 FL (ref 80–100)
MONOCYTES # BLD AUTO: 0.59 X10*3/UL (ref 0.1–1)
MONOCYTES NFR BLD AUTO: 7.4 %
NEUTROPHILS # BLD AUTO: 4.67 X10*3/UL (ref 1.2–7.7)
NEUTROPHILS NFR BLD AUTO: 58.5 %
NITRITE UR QL STRIP.AUTO: NEGATIVE
NRBC BLD-RTO: 0 /100 WBCS (ref 0–0)
PH UR STRIP.AUTO: 5.5 [PH]
PLATELET # BLD AUTO: 166 X10*3/UL (ref 150–450)
POTASSIUM SERPL-SCNC: 4.8 MMOL/L (ref 3.5–5.3)
PROT SERPL-MCNC: 6.2 G/DL (ref 6.4–8.2)
PROT UR STRIP.AUTO-MCNC: NEGATIVE MG/DL
PROTHROMBIN TIME: 10.4 SECONDS (ref 9.8–12.4)
RBC # BLD AUTO: 4.75 X10*6/UL (ref 4.5–5.9)
RBC # UR STRIP.AUTO: NEGATIVE MG/DL
SODIUM SERPL-SCNC: 134 MMOL/L (ref 136–145)
SP GR UR STRIP.AUTO: 1.02
UROBILINOGEN UR STRIP.AUTO-MCNC: NORMAL MG/DL
WBC # BLD AUTO: 8 X10*3/UL (ref 4.4–11.3)

## 2025-04-13 PROCEDURE — 85025 COMPLETE CBC W/AUTO DIFF WBC: CPT | Performed by: EMERGENCY MEDICINE

## 2025-04-13 PROCEDURE — 2500000001 HC RX 250 WO HCPCS SELF ADMINISTERED DRUGS (ALT 637 FOR MEDICARE OP)

## 2025-04-13 PROCEDURE — 81003 URINALYSIS AUTO W/O SCOPE: CPT | Performed by: EMERGENCY MEDICINE

## 2025-04-13 PROCEDURE — 71045 X-RAY EXAM CHEST 1 VIEW: CPT

## 2025-04-13 PROCEDURE — 85610 PROTHROMBIN TIME: CPT | Performed by: EMERGENCY MEDICINE

## 2025-04-13 PROCEDURE — 83735 ASSAY OF MAGNESIUM: CPT | Performed by: EMERGENCY MEDICINE

## 2025-04-13 PROCEDURE — 99285 EMERGENCY DEPT VISIT HI MDM: CPT | Performed by: EMERGENCY MEDICINE

## 2025-04-13 PROCEDURE — 82947 ASSAY GLUCOSE BLOOD QUANT: CPT

## 2025-04-13 PROCEDURE — 96361 HYDRATE IV INFUSION ADD-ON: CPT

## 2025-04-13 PROCEDURE — 71045 X-RAY EXAM CHEST 1 VIEW: CPT | Performed by: RADIOLOGY

## 2025-04-13 PROCEDURE — 36415 COLL VENOUS BLD VENIPUNCTURE: CPT | Performed by: EMERGENCY MEDICINE

## 2025-04-13 PROCEDURE — 93005 ELECTROCARDIOGRAM TRACING: CPT

## 2025-04-13 PROCEDURE — 80053 COMPREHEN METABOLIC PANEL: CPT | Performed by: EMERGENCY MEDICINE

## 2025-04-13 PROCEDURE — 82550 ASSAY OF CK (CPK): CPT | Performed by: INTERNAL MEDICINE

## 2025-04-13 PROCEDURE — 84484 ASSAY OF TROPONIN QUANT: CPT | Performed by: EMERGENCY MEDICINE

## 2025-04-13 PROCEDURE — 83880 ASSAY OF NATRIURETIC PEPTIDE: CPT | Performed by: EMERGENCY MEDICINE

## 2025-04-13 PROCEDURE — 2500000004 HC RX 250 GENERAL PHARMACY W/ HCPCS (ALT 636 FOR OP/ED): Performed by: EMERGENCY MEDICINE

## 2025-04-13 RX ORDER — NITROGLYCERIN 0.4 MG/1
TABLET SUBLINGUAL
Status: COMPLETED
Start: 2025-04-13 | End: 2025-04-13

## 2025-04-13 RX ORDER — NAPROXEN SODIUM 220 MG/1
324 TABLET, FILM COATED ORAL ONCE
Status: COMPLETED | OUTPATIENT
Start: 2025-04-13 | End: 2025-04-13

## 2025-04-13 RX ORDER — NITROGLYCERIN 0.4 MG/1
0.4 TABLET SUBLINGUAL ONCE
Status: COMPLETED | OUTPATIENT
Start: 2025-04-13 | End: 2025-04-13

## 2025-04-13 RX ORDER — NAPROXEN SODIUM 220 MG/1
TABLET, FILM COATED ORAL
Status: COMPLETED
Start: 2025-04-13 | End: 2025-04-13

## 2025-04-13 RX ADMIN — NITROGLYCERIN 0.4 MG: 0.4 TABLET SUBLINGUAL at 22:19

## 2025-04-13 RX ADMIN — NAPROXEN SODIUM 324 MG: 220 TABLET, FILM COATED ORAL at 22:19

## 2025-04-13 RX ADMIN — ASPIRIN 81 MG CHEWABLE TABLET 324 MG: 81 TABLET CHEWABLE at 22:19

## 2025-04-13 RX ADMIN — SODIUM CHLORIDE 500 ML: 0.9 INJECTION, SOLUTION INTRAVENOUS at 22:18

## 2025-04-13 ASSESSMENT — PAIN SCALES - GENERAL
PAINLEVEL_OUTOF10: 5 - MODERATE PAIN
PAINLEVEL_OUTOF10: 0 - NO PAIN

## 2025-04-13 ASSESSMENT — HEART SCORE
HISTORY: MODERATELY SUSPICIOUS
ECG: NORMAL
HEART SCORE: 4
RISK FACTORS: >2 RISK FACTORS OR HX OF ATHEROSCLEROTIC DISEASE
AGE: 45-64
TROPONIN: LESS THAN OR EQUAL TO NORMAL LIMIT

## 2025-04-13 ASSESSMENT — PAIN DESCRIPTION - PAIN TYPE: TYPE: ACUTE PAIN

## 2025-04-13 ASSESSMENT — PAIN DESCRIPTION - LOCATION: LOCATION: CHEST

## 2025-04-13 ASSESSMENT — PAIN - FUNCTIONAL ASSESSMENT: PAIN_FUNCTIONAL_ASSESSMENT: 0-10

## 2025-04-14 ENCOUNTER — APPOINTMENT (OUTPATIENT)
Dept: CARDIOLOGY | Facility: HOSPITAL | Age: 65
End: 2025-04-14
Payer: OTHER GOVERNMENT

## 2025-04-14 PROBLEM — R07.2 SUBSTERNAL CHEST PAIN: Status: ACTIVE | Noted: 2025-04-14

## 2025-04-14 PROBLEM — E11.65 TYPE 2 DIABETES MELLITUS WITH HYPERGLYCEMIA, WITH LONG-TERM CURRENT USE OF INSULIN: Status: ACTIVE | Noted: 2025-04-14

## 2025-04-14 PROBLEM — R07.2 SUBSTERNAL CHEST PAIN RELIEVED BY NITROGLYCERIN: Status: ACTIVE | Noted: 2025-04-14

## 2025-04-14 PROBLEM — E87.1 HYPONATREMIA: Status: ACTIVE | Noted: 2025-04-14

## 2025-04-14 PROBLEM — Z79.4 TYPE 2 DIABETES MELLITUS WITH HYPERGLYCEMIA, WITH LONG-TERM CURRENT USE OF INSULIN: Status: ACTIVE | Noted: 2025-04-14

## 2025-04-14 LAB
ALBUMIN SERPL BCP-MCNC: 3.5 G/DL (ref 3.4–5)
ALBUMIN SERPL BCP-MCNC: 3.6 G/DL (ref 3.4–5)
ALP SERPL-CCNC: 56 U/L (ref 33–136)
ALT SERPL W P-5'-P-CCNC: 29 U/L (ref 10–52)
ANION GAP SERPL CALC-SCNC: 10 MMOL/L (ref 10–20)
ANION GAP SERPL CALC-SCNC: 9 MMOL/L (ref 10–20)
AORTIC VALVE MEAN GRADIENT: 15 MMHG
AORTIC VALVE PEAK VELOCITY: 2.64 M/S
AST SERPL W P-5'-P-CCNC: 16 U/L (ref 9–39)
AV PEAK GRADIENT: 28 MMHG
AVA (PEAK VEL): 1.13 CM2
AVA (VTI): 1.28 CM2
BASOPHILS # BLD AUTO: 0.07 X10*3/UL (ref 0–0.1)
BASOPHILS NFR BLD AUTO: 1 %
BILIRUB DIRECT SERPL-MCNC: 0.1 MG/DL (ref 0–0.3)
BILIRUB SERPL-MCNC: 0.5 MG/DL (ref 0–1.2)
BUN SERPL-MCNC: 18 MG/DL (ref 6–23)
BUN SERPL-MCNC: 20 MG/DL (ref 6–23)
CALCIUM SERPL-MCNC: 8.2 MG/DL (ref 8.6–10.3)
CALCIUM SERPL-MCNC: 8.2 MG/DL (ref 8.6–10.3)
CARDIAC TROPONIN I PNL SERPL HS: 3 NG/L (ref 0–20)
CHLORIDE SERPL-SCNC: 106 MMOL/L (ref 98–107)
CHLORIDE SERPL-SCNC: 106 MMOL/L (ref 98–107)
CK SERPL-CCNC: 83 U/L (ref 0–325)
CO2 SERPL-SCNC: 24 MMOL/L (ref 21–32)
CO2 SERPL-SCNC: 27 MMOL/L (ref 21–32)
CREAT SERPL-MCNC: 1.3 MG/DL (ref 0.5–1.3)
CREAT SERPL-MCNC: 1.38 MG/DL (ref 0.5–1.3)
EGFRCR SERPLBLD CKD-EPI 2021: 57 ML/MIN/1.73M*2
EGFRCR SERPLBLD CKD-EPI 2021: 61 ML/MIN/1.73M*2
EJECTION FRACTION APICAL 4 CHAMBER: 58.6
EJECTION FRACTION: 58 %
EOSINOPHIL # BLD AUTO: 0.68 X10*3/UL (ref 0–0.7)
EOSINOPHIL NFR BLD AUTO: 10.2 %
ERYTHROCYTE [DISTWIDTH] IN BLOOD BY AUTOMATED COUNT: 14.6 % (ref 11.5–14.5)
EST. AVERAGE GLUCOSE BLD GHB EST-MCNC: 260 MG/DL
GLUCOSE BLD MANUAL STRIP-MCNC: 118 MG/DL (ref 74–99)
GLUCOSE BLD MANUAL STRIP-MCNC: 131 MG/DL (ref 74–99)
GLUCOSE BLD MANUAL STRIP-MCNC: 150 MG/DL (ref 74–99)
GLUCOSE BLD MANUAL STRIP-MCNC: 238 MG/DL (ref 74–99)
GLUCOSE SERPL-MCNC: 140 MG/DL (ref 74–99)
GLUCOSE SERPL-MCNC: 245 MG/DL (ref 74–99)
HBA1C MFR BLD: 10.7 %
HCT VFR BLD AUTO: 40.4 % (ref 41–52)
HGB BLD-MCNC: 13 G/DL (ref 13.5–17.5)
HOLD SPECIMEN: NORMAL
IMM GRANULOCYTES # BLD AUTO: 0.02 X10*3/UL (ref 0–0.7)
IMM GRANULOCYTES NFR BLD AUTO: 0.3 % (ref 0–0.9)
LEFT ATRIUM VOLUME AREA LENGTH INDEX BSA: 23.6 ML/M2
LEFT VENTRICLE INTERNAL DIMENSION DIASTOLE: 4.95 CM (ref 3.5–6)
LEFT VENTRICULAR OUTFLOW TRACT DIAMETER: 2 CM
LV EJECTION FRACTION BIPLANE: 58 %
LYMPHOCYTES # BLD AUTO: 2.5 X10*3/UL (ref 1.2–4.8)
LYMPHOCYTES NFR BLD AUTO: 37.4 %
MCH RBC QN AUTO: 28.3 PG (ref 26–34)
MCHC RBC AUTO-ENTMCNC: 32.2 G/DL (ref 32–36)
MCV RBC AUTO: 88 FL (ref 80–100)
MITRAL VALVE E/A RATIO: 0.93
MONOCYTES # BLD AUTO: 0.49 X10*3/UL (ref 0.1–1)
MONOCYTES NFR BLD AUTO: 7.3 %
NEUTROPHILS # BLD AUTO: 2.93 X10*3/UL (ref 1.2–7.7)
NEUTROPHILS NFR BLD AUTO: 43.8 %
NRBC BLD-RTO: 0 /100 WBCS (ref 0–0)
PHOSPHATE SERPL-MCNC: 3.6 MG/DL (ref 2.5–4.9)
PHOSPHATE SERPL-MCNC: 4.1 MG/DL (ref 2.5–4.9)
PLATELET # BLD AUTO: 129 X10*3/UL (ref 150–450)
POTASSIUM SERPL-SCNC: 3.9 MMOL/L (ref 3.5–5.3)
POTASSIUM SERPL-SCNC: 4.6 MMOL/L (ref 3.5–5.3)
PROT SERPL-MCNC: 5.6 G/DL (ref 6.4–8.2)
RBC # BLD AUTO: 4.59 X10*6/UL (ref 4.5–5.9)
RIGHT VENTRICLE FREE WALL PEAK S': 15.6 CM/S
RIGHT VENTRICLE PEAK SYSTOLIC PRESSURE: 15.1 MMHG
SODIUM SERPL-SCNC: 135 MMOL/L (ref 136–145)
SODIUM SERPL-SCNC: 138 MMOL/L (ref 136–145)
TRICUSPID ANNULAR PLANE SYSTOLIC EXCURSION: 2 CM
WBC # BLD AUTO: 6.7 X10*3/UL (ref 4.4–11.3)

## 2025-04-14 PROCEDURE — 96365 THER/PROPH/DIAG IV INF INIT: CPT | Mod: 59

## 2025-04-14 PROCEDURE — 2500000004 HC RX 250 GENERAL PHARMACY W/ HCPCS (ALT 636 FOR OP/ED): Performed by: INTERNAL MEDICINE

## 2025-04-14 PROCEDURE — 80048 BASIC METABOLIC PNL TOTAL CA: CPT | Performed by: INTERNAL MEDICINE

## 2025-04-14 PROCEDURE — 93017 CV STRESS TEST TRACING ONLY: CPT

## 2025-04-14 PROCEDURE — 2500000004 HC RX 250 GENERAL PHARMACY W/ HCPCS (ALT 636 FOR OP/ED): Performed by: EMERGENCY MEDICINE

## 2025-04-14 PROCEDURE — G0378 HOSPITAL OBSERVATION PER HR: HCPCS

## 2025-04-14 PROCEDURE — 36415 COLL VENOUS BLD VENIPUNCTURE: CPT | Performed by: INTERNAL MEDICINE

## 2025-04-14 PROCEDURE — 93306 TTE W/DOPPLER COMPLETE: CPT | Performed by: INTERNAL MEDICINE

## 2025-04-14 PROCEDURE — 96361 HYDRATE IV INFUSION ADD-ON: CPT

## 2025-04-14 PROCEDURE — 2500000001 HC RX 250 WO HCPCS SELF ADMINISTERED DRUGS (ALT 637 FOR MEDICARE OP): Performed by: INTERNAL MEDICINE

## 2025-04-14 PROCEDURE — 93016 CV STRESS TEST SUPVJ ONLY: CPT | Performed by: INTERNAL MEDICINE

## 2025-04-14 PROCEDURE — 2500000002 HC RX 250 W HCPCS SELF ADMINISTERED DRUGS (ALT 637 FOR MEDICARE OP, ALT 636 FOR OP/ED): Performed by: INTERNAL MEDICINE

## 2025-04-14 PROCEDURE — 2500000001 HC RX 250 WO HCPCS SELF ADMINISTERED DRUGS (ALT 637 FOR MEDICARE OP): Performed by: REGISTERED NURSE

## 2025-04-14 PROCEDURE — 80069 RENAL FUNCTION PANEL: CPT | Mod: CCI | Performed by: INTERNAL MEDICINE

## 2025-04-14 PROCEDURE — 93018 CV STRESS TEST I&R ONLY: CPT | Performed by: INTERNAL MEDICINE

## 2025-04-14 PROCEDURE — 85025 COMPLETE CBC W/AUTO DIFF WBC: CPT | Performed by: INTERNAL MEDICINE

## 2025-04-14 PROCEDURE — 83036 HEMOGLOBIN GLYCOSYLATED A1C: CPT | Mod: PORLAB | Performed by: INTERNAL MEDICINE

## 2025-04-14 PROCEDURE — 93306 TTE W/DOPPLER COMPLETE: CPT

## 2025-04-14 PROCEDURE — 93350 STRESS TTE ONLY: CPT | Performed by: INTERNAL MEDICINE

## 2025-04-14 PROCEDURE — 96376 TX/PRO/DX INJ SAME DRUG ADON: CPT | Mod: 59

## 2025-04-14 PROCEDURE — 82947 ASSAY GLUCOSE BLOOD QUANT: CPT | Mod: 59

## 2025-04-14 PROCEDURE — 96366 THER/PROPH/DIAG IV INF ADDON: CPT

## 2025-04-14 PROCEDURE — 99223 1ST HOSP IP/OBS HIGH 75: CPT | Performed by: INTERNAL MEDICINE

## 2025-04-14 PROCEDURE — 84075 ASSAY ALKALINE PHOSPHATASE: CPT | Performed by: INTERNAL MEDICINE

## 2025-04-14 PROCEDURE — 84100 ASSAY OF PHOSPHORUS: CPT | Performed by: INTERNAL MEDICINE

## 2025-04-14 PROCEDURE — 99233 SBSQ HOSP IP/OBS HIGH 50: CPT | Performed by: INTERNAL MEDICINE

## 2025-04-14 PROCEDURE — 96372 THER/PROPH/DIAG INJ SC/IM: CPT | Performed by: INTERNAL MEDICINE

## 2025-04-14 PROCEDURE — 99222 1ST HOSP IP/OBS MODERATE 55: CPT | Performed by: INTERNAL MEDICINE

## 2025-04-14 PROCEDURE — 96375 TX/PRO/DX INJ NEW DRUG ADDON: CPT | Mod: 59

## 2025-04-14 PROCEDURE — 80061 LIPID PANEL: CPT | Performed by: NURSE PRACTITIONER

## 2025-04-14 RX ORDER — BUPROPION HYDROCHLORIDE 150 MG/1
150 TABLET ORAL EVERY MORNING
Status: DISCONTINUED | OUTPATIENT
Start: 2025-04-14 | End: 2025-04-15 | Stop reason: HOSPADM

## 2025-04-14 RX ORDER — GUAIFENESIN 600 MG/1
600 TABLET, EXTENDED RELEASE ORAL EVERY 12 HOURS PRN
Status: DISCONTINUED | OUTPATIENT
Start: 2025-04-14 | End: 2025-04-15 | Stop reason: HOSPADM

## 2025-04-14 RX ORDER — ATROPINE SULFATE 0.4 MG/ML
.25-2 INJECTION, SOLUTION ENDOTRACHEAL; INTRAMEDULLARY; INTRAMUSCULAR; INTRAVENOUS; SUBCUTANEOUS
Status: DISCONTINUED | OUTPATIENT
Start: 2025-04-14 | End: 2025-04-15 | Stop reason: HOSPADM

## 2025-04-14 RX ORDER — BISACODYL 5 MG
10 TABLET, DELAYED RELEASE (ENTERIC COATED) ORAL DAILY PRN
Status: DISCONTINUED | OUTPATIENT
Start: 2025-04-14 | End: 2025-04-15 | Stop reason: HOSPADM

## 2025-04-14 RX ORDER — INSULIN LISPRO 100 [IU]/ML
0-10 INJECTION, SOLUTION INTRAVENOUS; SUBCUTANEOUS
Status: DISCONTINUED | OUTPATIENT
Start: 2025-04-14 | End: 2025-04-15 | Stop reason: HOSPADM

## 2025-04-14 RX ORDER — SODIUM CHLORIDE 9 MG/ML
75 INJECTION, SOLUTION INTRAVENOUS CONTINUOUS
Status: ACTIVE | OUTPATIENT
Start: 2025-04-14 | End: 2025-04-14

## 2025-04-14 RX ORDER — POLYETHYLENE GLYCOL 3350 17 G/17G
17 POWDER, FOR SOLUTION ORAL DAILY
Status: DISCONTINUED | OUTPATIENT
Start: 2025-04-14 | End: 2025-04-15 | Stop reason: HOSPADM

## 2025-04-14 RX ORDER — EZETIMIBE 10 MG/1
10 TABLET ORAL NIGHTLY
Status: DISCONTINUED | OUTPATIENT
Start: 2025-04-14 | End: 2025-04-15 | Stop reason: HOSPADM

## 2025-04-14 RX ORDER — TALC
3 POWDER (GRAM) TOPICAL NIGHTLY PRN
Status: DISCONTINUED | OUTPATIENT
Start: 2025-04-14 | End: 2025-04-15 | Stop reason: HOSPADM

## 2025-04-14 RX ORDER — METOPROLOL TARTRATE 1 MG/ML
5 INJECTION, SOLUTION INTRAVENOUS ONCE
Status: COMPLETED | OUTPATIENT
Start: 2025-04-14 | End: 2025-04-14

## 2025-04-14 RX ORDER — PANTOPRAZOLE SODIUM 40 MG/10ML
40 INJECTION, POWDER, LYOPHILIZED, FOR SOLUTION INTRAVENOUS NIGHTLY
Status: DISCONTINUED | OUTPATIENT
Start: 2025-04-14 | End: 2025-04-15 | Stop reason: HOSPADM

## 2025-04-14 RX ORDER — ONDANSETRON 4 MG/1
4 TABLET, ORALLY DISINTEGRATING ORAL EVERY 8 HOURS PRN
Status: DISCONTINUED | OUTPATIENT
Start: 2025-04-14 | End: 2025-04-15 | Stop reason: HOSPADM

## 2025-04-14 RX ORDER — PREGABALIN 75 MG/1
300 CAPSULE ORAL 2 TIMES DAILY
Status: DISCONTINUED | OUTPATIENT
Start: 2025-04-14 | End: 2025-04-15 | Stop reason: HOSPADM

## 2025-04-14 RX ORDER — BISACODYL 10 MG/1
10 SUPPOSITORY RECTAL DAILY PRN
Status: DISCONTINUED | OUTPATIENT
Start: 2025-04-14 | End: 2025-04-15 | Stop reason: HOSPADM

## 2025-04-14 RX ORDER — NITROGLYCERIN 0.4 MG/1
0.4 TABLET SUBLINGUAL EVERY 5 MIN PRN
Status: DISCONTINUED | OUTPATIENT
Start: 2025-04-14 | End: 2025-04-15 | Stop reason: HOSPADM

## 2025-04-14 RX ORDER — MORPHINE SULFATE 2 MG/ML
2 INJECTION, SOLUTION INTRAMUSCULAR; INTRAVENOUS EVERY 4 HOURS PRN
Status: DISCONTINUED | OUTPATIENT
Start: 2025-04-14 | End: 2025-04-15 | Stop reason: HOSPADM

## 2025-04-14 RX ORDER — ALBUTEROL SULFATE 90 UG/1
2 INHALANT RESPIRATORY (INHALATION) EVERY 4 HOURS PRN
Status: DISCONTINUED | OUTPATIENT
Start: 2025-04-14 | End: 2025-04-15 | Stop reason: HOSPADM

## 2025-04-14 RX ORDER — ONDANSETRON HYDROCHLORIDE 2 MG/ML
4 INJECTION, SOLUTION INTRAVENOUS EVERY 8 HOURS PRN
Status: DISCONTINUED | OUTPATIENT
Start: 2025-04-14 | End: 2025-04-15 | Stop reason: HOSPADM

## 2025-04-14 RX ORDER — ALLOPURINOL 300 MG/1
300 TABLET ORAL DAILY
Status: DISCONTINUED | OUTPATIENT
Start: 2025-04-14 | End: 2025-04-15 | Stop reason: HOSPADM

## 2025-04-14 RX ORDER — ATORVASTATIN CALCIUM 40 MG/1
40 TABLET, FILM COATED ORAL NIGHTLY
Status: DISCONTINUED | OUTPATIENT
Start: 2025-04-14 | End: 2025-04-15 | Stop reason: HOSPADM

## 2025-04-14 RX ORDER — FINASTERIDE 5 MG/1
5 TABLET, FILM COATED ORAL DAILY
Status: DISCONTINUED | OUTPATIENT
Start: 2025-04-14 | End: 2025-04-15 | Stop reason: HOSPADM

## 2025-04-14 RX ORDER — ALUMINUM HYDROXIDE, MAGNESIUM HYDROXIDE, AND SIMETHICONE 1200; 120; 1200 MG/30ML; MG/30ML; MG/30ML
30 SUSPENSION ORAL 4 TIMES DAILY PRN
Status: DISCONTINUED | OUTPATIENT
Start: 2025-04-14 | End: 2025-04-15 | Stop reason: HOSPADM

## 2025-04-14 RX ORDER — CHOLECALCIFEROL (VITAMIN D3) 25 MCG
50 TABLET ORAL DAILY
Status: DISCONTINUED | OUTPATIENT
Start: 2025-04-14 | End: 2025-04-15 | Stop reason: HOSPADM

## 2025-04-14 RX ORDER — INSULIN GLARGINE 100 [IU]/ML
29 INJECTION, SOLUTION SUBCUTANEOUS EVERY MORNING
Status: DISCONTINUED | OUTPATIENT
Start: 2025-04-14 | End: 2025-04-15 | Stop reason: HOSPADM

## 2025-04-14 RX ORDER — VENLAFAXINE HYDROCHLORIDE 150 MG/1
150 CAPSULE, EXTENDED RELEASE ORAL DAILY
Status: DISCONTINUED | OUTPATIENT
Start: 2025-04-14 | End: 2025-04-15 | Stop reason: HOSPADM

## 2025-04-14 RX ORDER — DOBUTAMINE HYDROCHLORIDE 100 MG/100ML
5-40 INJECTION INTRAVENOUS CONTINUOUS
Status: DISCONTINUED | OUTPATIENT
Start: 2025-04-14 | End: 2025-04-14

## 2025-04-14 RX ORDER — METOPROLOL TARTRATE 25 MG/1
12.5 TABLET, FILM COATED ORAL 2 TIMES DAILY
Status: DISCONTINUED | OUTPATIENT
Start: 2025-04-14 | End: 2025-04-15 | Stop reason: HOSPADM

## 2025-04-14 RX ORDER — HEPARIN SODIUM 5000 [USP'U]/ML
5000 INJECTION, SOLUTION INTRAVENOUS; SUBCUTANEOUS EVERY 8 HOURS SCHEDULED
Status: DISCONTINUED | OUTPATIENT
Start: 2025-04-14 | End: 2025-04-15 | Stop reason: HOSPADM

## 2025-04-14 RX ORDER — ASPIRIN 81 MG/1
81 TABLET ORAL DAILY
Status: DISCONTINUED | OUTPATIENT
Start: 2025-04-14 | End: 2025-04-15 | Stop reason: HOSPADM

## 2025-04-14 RX ORDER — DEXTROSE 50 % IN WATER (D50W) INTRAVENOUS SYRINGE
12.5
Status: DISCONTINUED | OUTPATIENT
Start: 2025-04-14 | End: 2025-04-15 | Stop reason: HOSPADM

## 2025-04-14 RX ORDER — DEXTROSE 50 % IN WATER (D50W) INTRAVENOUS SYRINGE
25
Status: DISCONTINUED | OUTPATIENT
Start: 2025-04-14 | End: 2025-04-15 | Stop reason: HOSPADM

## 2025-04-14 RX ORDER — FLUTICASONE PROPIONATE 50 MCG
2 SPRAY, SUSPENSION (ML) NASAL NIGHTLY
Status: DISCONTINUED | OUTPATIENT
Start: 2025-04-14 | End: 2025-04-15 | Stop reason: HOSPADM

## 2025-04-14 RX ADMIN — DOBUTAMINE HYDROCHLORIDE 40 MCG/KG/MIN: 100 INJECTION INTRAVENOUS at 10:00

## 2025-04-14 RX ADMIN — Medication 50 MCG: at 10:42

## 2025-04-14 RX ADMIN — ALLOPURINOL 300 MG: 300 TABLET ORAL at 10:42

## 2025-04-14 RX ADMIN — PANTOPRAZOLE SODIUM 40 MG: 40 INJECTION, POWDER, FOR SOLUTION INTRAVENOUS at 22:47

## 2025-04-14 RX ADMIN — METOPROLOL TARTRATE 12.5 MG: 25 TABLET, FILM COATED ORAL at 22:47

## 2025-04-14 RX ADMIN — ASPIRIN 81 MG: 81 TABLET, COATED ORAL at 10:52

## 2025-04-14 RX ADMIN — SODIUM CHLORIDE 500 ML: 9 INJECTION, SOLUTION INTRAVENOUS at 00:10

## 2025-04-14 RX ADMIN — PANTOPRAZOLE SODIUM 40 MG: 40 INJECTION, POWDER, FOR SOLUTION INTRAVENOUS at 04:01

## 2025-04-14 RX ADMIN — FINASTERIDE 5 MG: 5 TABLET, FILM COATED ORAL at 10:42

## 2025-04-14 RX ADMIN — HEPARIN SODIUM 5000 UNITS: 5000 INJECTION, SOLUTION INTRAVENOUS; SUBCUTANEOUS at 22:47

## 2025-04-14 RX ADMIN — VENLAFAXINE HYDROCHLORIDE 150 MG: 150 CAPSULE, EXTENDED RELEASE ORAL at 10:42

## 2025-04-14 RX ADMIN — BUPROPION HYDROCHLORIDE 150 MG: 150 TABLET, EXTENDED RELEASE ORAL at 10:42

## 2025-04-14 RX ADMIN — INSULIN GLARGINE 29 UNITS: 100 INJECTION, SOLUTION SUBCUTANEOUS at 10:44

## 2025-04-14 RX ADMIN — METOPROLOL TARTRATE 12.5 MG: 25 TABLET, FILM COATED ORAL at 10:52

## 2025-04-14 RX ADMIN — PERFLUTREN 2 ML OF DILUTION: 6.52 INJECTION, SUSPENSION INTRAVENOUS at 09:29

## 2025-04-14 RX ADMIN — TICAGRELOR 90 MG: 90 TABLET ORAL at 10:42

## 2025-04-14 RX ADMIN — FLUTICASONE PROPIONATE 2 SPRAY: 50 SPRAY, METERED NASAL at 22:46

## 2025-04-14 RX ADMIN — SODIUM CHLORIDE 75 ML/HR: 9 INJECTION, SOLUTION INTRAVENOUS at 04:01

## 2025-04-14 RX ADMIN — METOPROLOL TARTRATE 5 MG: 5 INJECTION INTRAVENOUS at 10:01

## 2025-04-14 RX ADMIN — ATORVASTATIN CALCIUM 40 MG: 40 TABLET, FILM COATED ORAL at 22:47

## 2025-04-14 RX ADMIN — TICAGRELOR 90 MG: 90 TABLET ORAL at 22:46

## 2025-04-14 RX ADMIN — HEPARIN SODIUM 5000 UNITS: 5000 INJECTION, SOLUTION INTRAVENOUS; SUBCUTANEOUS at 06:43

## 2025-04-14 RX ADMIN — ATROPINE SULFATE 1 MG: 0.4 INJECTION, SOLUTION INTRAVENOUS at 09:59

## 2025-04-14 RX ADMIN — EZETIMIBE 10 MG: 10 TABLET ORAL at 22:47

## 2025-04-14 RX ADMIN — PREGABALIN 300 MG: 75 CAPSULE ORAL at 23:33

## 2025-04-14 RX ADMIN — HEPARIN SODIUM 5000 UNITS: 5000 INJECTION, SOLUTION INTRAVENOUS; SUBCUTANEOUS at 14:20

## 2025-04-14 SDOH — SOCIAL STABILITY: SOCIAL INSECURITY: DOES ANYONE TRY TO KEEP YOU FROM HAVING/CONTACTING OTHER FRIENDS OR DOING THINGS OUTSIDE YOUR HOME?: NO

## 2025-04-14 SDOH — SOCIAL STABILITY: SOCIAL INSECURITY: ARE YOU OR HAVE YOU BEEN THREATENED OR ABUSED PHYSICALLY, EMOTIONALLY, OR SEXUALLY BY ANYONE?: NO

## 2025-04-14 SDOH — ECONOMIC STABILITY: HOUSING INSECURITY: IN THE PAST 12 MONTHS, HOW MANY TIMES HAVE YOU MOVED WHERE YOU WERE LIVING?: 0

## 2025-04-14 SDOH — SOCIAL STABILITY: SOCIAL INSECURITY: WITHIN THE LAST YEAR, HAVE YOU BEEN AFRAID OF YOUR PARTNER OR EX-PARTNER?: NO

## 2025-04-14 SDOH — ECONOMIC STABILITY: FOOD INSECURITY: WITHIN THE PAST 12 MONTHS, THE FOOD YOU BOUGHT JUST DIDN'T LAST AND YOU DIDN'T HAVE MONEY TO GET MORE.: NEVER TRUE

## 2025-04-14 SDOH — SOCIAL STABILITY: SOCIAL NETWORK: HOW OFTEN DO YOU GET TOGETHER WITH FRIENDS OR RELATIVES?: PATIENT DECLINED

## 2025-04-14 SDOH — ECONOMIC STABILITY: FOOD INSECURITY: WITHIN THE PAST 12 MONTHS, YOU WORRIED THAT YOUR FOOD WOULD RUN OUT BEFORE YOU GOT THE MONEY TO BUY MORE.: NEVER TRUE

## 2025-04-14 SDOH — SOCIAL STABILITY: SOCIAL INSECURITY: WITHIN THE LAST YEAR, HAVE YOU BEEN HUMILIATED OR EMOTIONALLY ABUSED IN OTHER WAYS BY YOUR PARTNER OR EX-PARTNER?: NO

## 2025-04-14 SDOH — SOCIAL STABILITY: SOCIAL INSECURITY: ARE THERE ANY APPARENT SIGNS OF INJURIES/BEHAVIORS THAT COULD BE RELATED TO ABUSE/NEGLECT?: NO

## 2025-04-14 SDOH — HEALTH STABILITY: MENTAL HEALTH: HOW MANY DRINKS CONTAINING ALCOHOL DO YOU HAVE ON A TYPICAL DAY WHEN YOU ARE DRINKING?: PATIENT DOES NOT DRINK

## 2025-04-14 SDOH — ECONOMIC STABILITY: FOOD INSECURITY: HOW HARD IS IT FOR YOU TO PAY FOR THE VERY BASICS LIKE FOOD, HOUSING, MEDICAL CARE, AND HEATING?: NOT HARD AT ALL

## 2025-04-14 SDOH — SOCIAL STABILITY: SOCIAL NETWORK: HOW OFTEN DO YOU ATTEND CHURCH OR RELIGIOUS SERVICES?: PATIENT DECLINED

## 2025-04-14 SDOH — HEALTH STABILITY: MENTAL HEALTH: HOW OFTEN DO YOU HAVE A DRINK CONTAINING ALCOHOL?: NEVER

## 2025-04-14 SDOH — ECONOMIC STABILITY: HOUSING INSECURITY: AT ANY TIME IN THE PAST 12 MONTHS, WERE YOU HOMELESS OR LIVING IN A SHELTER (INCLUDING NOW)?: NO

## 2025-04-14 SDOH — HEALTH STABILITY: MENTAL HEALTH: HOW OFTEN DO YOU HAVE SIX OR MORE DRINKS ON ONE OCCASION?: NEVER

## 2025-04-14 SDOH — HEALTH STABILITY: PHYSICAL HEALTH
ON AVERAGE, HOW MANY DAYS PER WEEK DO YOU ENGAGE IN MODERATE TO STRENUOUS EXERCISE (LIKE A BRISK WALK)?: PATIENT DECLINED

## 2025-04-14 SDOH — ECONOMIC STABILITY: HOUSING INSECURITY: IN THE LAST 12 MONTHS, WAS THERE A TIME WHEN YOU WERE NOT ABLE TO PAY THE MORTGAGE OR RENT ON TIME?: NO

## 2025-04-14 SDOH — SOCIAL STABILITY: SOCIAL NETWORK
DO YOU BELONG TO ANY CLUBS OR ORGANIZATIONS SUCH AS CHURCH GROUPS, UNIONS, FRATERNAL OR ATHLETIC GROUPS, OR SCHOOL GROUPS?: PATIENT DECLINED

## 2025-04-14 SDOH — SOCIAL STABILITY: SOCIAL INSECURITY: ABUSE: ADULT

## 2025-04-14 SDOH — HEALTH STABILITY: PHYSICAL HEALTH
HOW OFTEN DO YOU NEED TO HAVE SOMEONE HELP YOU WHEN YOU READ INSTRUCTIONS, PAMPHLETS, OR OTHER WRITTEN MATERIAL FROM YOUR DOCTOR OR PHARMACY?: PATIENT DECLINES TO RESPOND

## 2025-04-14 SDOH — ECONOMIC STABILITY: INCOME INSECURITY: IN THE PAST 12 MONTHS HAS THE ELECTRIC, GAS, OIL, OR WATER COMPANY THREATENED TO SHUT OFF SERVICES IN YOUR HOME?: NO

## 2025-04-14 SDOH — SOCIAL STABILITY: SOCIAL NETWORK: IN A TYPICAL WEEK, HOW MANY TIMES DO YOU TALK ON THE PHONE WITH FAMILY, FRIENDS, OR NEIGHBORS?: PATIENT DECLINED

## 2025-04-14 SDOH — SOCIAL STABILITY: SOCIAL INSECURITY: ARE YOU MARRIED, WIDOWED, DIVORCED, SEPARATED, NEVER MARRIED, OR LIVING WITH A PARTNER?: PATIENT DECLINED

## 2025-04-14 SDOH — HEALTH STABILITY: MENTAL HEALTH
DO YOU FEEL STRESS - TENSE, RESTLESS, NERVOUS, OR ANXIOUS, OR UNABLE TO SLEEP AT NIGHT BECAUSE YOUR MIND IS TROUBLED ALL THE TIME - THESE DAYS?: PATIENT DECLINED

## 2025-04-14 SDOH — SOCIAL STABILITY: SOCIAL INSECURITY: DO YOU FEEL UNSAFE GOING BACK TO THE PLACE WHERE YOU ARE LIVING?: NO

## 2025-04-14 SDOH — SOCIAL STABILITY: SOCIAL INSECURITY: WERE YOU ABLE TO COMPLETE ALL THE BEHAVIORAL HEALTH SCREENINGS?: YES

## 2025-04-14 SDOH — SOCIAL STABILITY: SOCIAL INSECURITY: HAVE YOU HAD ANY THOUGHTS OF HARMING ANYONE ELSE?: NO

## 2025-04-14 SDOH — SOCIAL STABILITY: SOCIAL NETWORK: HOW OFTEN DO YOU ATTEND MEETINGS OF THE CLUBS OR ORGANIZATIONS YOU BELONG TO?: PATIENT DECLINED

## 2025-04-14 SDOH — HEALTH STABILITY: PHYSICAL HEALTH: ON AVERAGE, HOW MANY MINUTES DO YOU ENGAGE IN EXERCISE AT THIS LEVEL?: PATIENT DECLINED

## 2025-04-14 SDOH — SOCIAL STABILITY: SOCIAL INSECURITY: HAVE YOU HAD THOUGHTS OF HARMING ANYONE ELSE?: NO

## 2025-04-14 SDOH — SOCIAL STABILITY: SOCIAL INSECURITY: DO YOU FEEL ANYONE HAS EXPLOITED OR TAKEN ADVANTAGE OF YOU FINANCIALLY OR OF YOUR PERSONAL PROPERTY?: NO

## 2025-04-14 SDOH — ECONOMIC STABILITY: TRANSPORTATION INSECURITY: IN THE PAST 12 MONTHS, HAS LACK OF TRANSPORTATION KEPT YOU FROM MEDICAL APPOINTMENTS OR FROM GETTING MEDICATIONS?: NO

## 2025-04-14 SDOH — SOCIAL STABILITY: SOCIAL INSECURITY: HAS ANYONE EVER THREATENED TO HURT YOUR FAMILY OR YOUR PETS?: NO

## 2025-04-14 ASSESSMENT — COGNITIVE AND FUNCTIONAL STATUS - GENERAL
MOBILITY SCORE: 24
DAILY ACTIVITIY SCORE: 24
MOBILITY SCORE: 24
DAILY ACTIVITIY SCORE: 24
MOBILITY SCORE: 24
DAILY ACTIVITIY SCORE: 24
PATIENT BASELINE BEDBOUND: NO

## 2025-04-14 ASSESSMENT — LIFESTYLE VARIABLES
HAVE YOU EVER FELT YOU SHOULD CUT DOWN ON YOUR DRINKING: NO
AUDIT-C TOTAL SCORE: -1
AUDIT-C TOTAL SCORE: 0
SKIP TO QUESTIONS 9-10: 0
TOTAL SCORE: 0
AUDIT-C TOTAL SCORE: -1
SUBSTANCE_ABUSE_PAST_12_MONTHS: NO
EVER HAD A DRINK FIRST THING IN THE MORNING TO STEADY YOUR NERVES TO GET RID OF A HANGOVER: NO
EVER FELT BAD OR GUILTY ABOUT YOUR DRINKING: NO
HOW MANY STANDARD DRINKS CONTAINING ALCOHOL DO YOU HAVE ON A TYPICAL DAY: PATIENT DOES NOT DRINK
HAVE PEOPLE ANNOYED YOU BY CRITICIZING YOUR DRINKING: NO
HOW OFTEN DO YOU HAVE 6 OR MORE DRINKS ON ONE OCCASION: NEVER
PRESCIPTION_ABUSE_PAST_12_MONTHS: NO
HOW OFTEN DO YOU HAVE A DRINK CONTAINING ALCOHOL: PATIENT DECLINED
SKIP TO QUESTIONS 9-10: 1

## 2025-04-14 ASSESSMENT — ACTIVITIES OF DAILY LIVING (ADL)
WALKS IN HOME: INDEPENDENT
DRESSING YOURSELF: INDEPENDENT
JUDGMENT_ADEQUATE_SAFELY_COMPLETE_DAILY_ACTIVITIES: YES
LACK_OF_TRANSPORTATION: NO
HEARING - RIGHT EAR: FUNCTIONAL
PATIENT'S MEMORY ADEQUATE TO SAFELY COMPLETE DAILY ACTIVITIES?: YES
ADEQUATE_TO_COMPLETE_ADL: YES
BATHING: INDEPENDENT
GROOMING: INDEPENDENT
LACK_OF_TRANSPORTATION: NO
HEARING - LEFT EAR: FUNCTIONAL
TOILETING: INDEPENDENT
FEEDING YOURSELF: INDEPENDENT

## 2025-04-14 ASSESSMENT — PAIN SCALES - GENERAL
PAINLEVEL_OUTOF10: 0 - NO PAIN
PAINLEVEL_OUTOF10: 5 - MODERATE PAIN

## 2025-04-14 ASSESSMENT — PATIENT HEALTH QUESTIONNAIRE - PHQ9
1. LITTLE INTEREST OR PLEASURE IN DOING THINGS: NOT AT ALL
SUM OF ALL RESPONSES TO PHQ9 QUESTIONS 1 & 2: 0
2. FEELING DOWN, DEPRESSED OR HOPELESS: NOT AT ALL

## 2025-04-14 ASSESSMENT — PAIN - FUNCTIONAL ASSESSMENT
PAIN_FUNCTIONAL_ASSESSMENT: 0-10
PAIN_FUNCTIONAL_ASSESSMENT: 0-10

## 2025-04-14 NOTE — CARE PLAN
The patient's goals for the shift include to get some rest    The clinical goals for the shift include maintain patient safety and manage patient's pain throughout the shift    Over the shift, the patient did make progress toward the following goals.

## 2025-04-14 NOTE — CARE PLAN
The patient's goals for the shift include to get some rest    The clinical goals for the shift include remain HDS    Over the shift, the patient did not make progress toward the following goals. Barriers to progression include n/a. Recommendations to address these barriers include n/a.

## 2025-04-14 NOTE — ASSESSMENT & PLAN NOTE
Resume usual cardiac medications as tolerated especially DAPT, high intensity statin, beta-blocker .

## 2025-04-14 NOTE — PROGRESS NOTES
This TCC covering remote today. Attempt to call into patient's room, no answer. Will attempt again at a later time.

## 2025-04-14 NOTE — ED PROVIDER NOTES
HPI   Chief Complaint   Patient presents with    Dizziness    Chest Pain       HPI    HISTORY OF PRESENT ILLNESS:  Patient is 64-year-old male with history of CAD status post 9 cardiac stents presenting to the emergency department for chest pain and near syncope.  Patient has been having 1.5 hours of near syncope and chest pain.  No specific aggravating relieving factors.  Does not feel like his prior heart attacks where he needed stents.  Describes it as palpitations.    Past Medical History: CAD status post 9 cardiac stents, aortic stenosis, BPH, orthostatic hypotension   Social History: Former smoker    __________________________________________________________  PHYSICAL EXAM:    Appearance: Alert, oriented , cooperative   Skin: Intact,  dry skin, no lesions, rash, petechiae or purpura.   Eyes: PERRLA, EOMs intact,  Conjunctiva pink with no redness or exudates.    HENT: Normocephalic, atraumatic. Nares patent   Neck: Supple. Trachea at midline.   Pulmonary: Lung sounds are clear bilaterally.  There is no rales, rhonchi, or wheezing.  Cardiac: Regular rate and rhythm, no rubs, murmurs, or gallops. No JVD,   Abdomen: Abdomen is soft, nontender, and nondistended.  No palpable organomegaly.  No rebound or guarding.  No CVA tenderness. Nonsurgical abdomen  Genitourinary: Exam deferred.  Musculoskeletal: no edema, pain, cyanosis, or deformity in extremities. Pulses full and equal.   Neurological:  Cranial nerves are grossly intact, grossly normal sensation, no weakness, no focal findings identified.    __________________________________________________________  MEDICAL DECISION MAKING:    Patient was seen and examined. Differential diagnosis for Chest pain and near syncope includes aortic stenosis, heart failure, ACS to name a few.  Patient started to have sudden onset of chest pain in addition to near syncope.  The patient vital signs here are stable.  He was given 324 mg of aspirin in addition to nitroglycerin.   Patient workup showed a first negative troponin and BNP.  I Independently reviewed patient's chest x-ray nonoily obvious pneumothorax or opacity.  Patient does have evidence of a CRISSY and was provided a full liter of IV fluids.  Reviewing patient's recent cardiac workup, the patient has not had a recent stress test since July 20, 2022.  After nitroglycerin, patient chest pain was resolved.  Based on the patient's prior cardiac risk factors and heart score of 4, recommend to have the patient admitted.  Patient was agreeable with this.  Case discussed with IMS and patient was admitted.      Patient twelve-lead EKG interpreted by myself shows sinus rhythm, intergrade 70, normal IA interval, normal axis, normal QRS duration, normal QT, no STEMI.      Chronic Medical Conditions Significantly Affecting Care: CAD status post cardiac stents, aortic stenosis, BPH, orthostatic hypotension     External Records Reviewed: I reviewed recent and relevant outside records including: Patient discharge starting February 2, 2025    [facility]  Emergency Medicine    Patient History   Past Medical History:   Diagnosis Date    Diabetes mellitus (Multi)     Personal history of other diseases of the circulatory system 10/16/2020    History of heart disease    Personal history of other diseases of the circulatory system 10/16/2020    History of hypertension     Past Surgical History:   Procedure Laterality Date    BACK SURGERY  07/14/2017    Back Surgery    CARPAL TUNNEL RELEASE  07/14/2017    Neuroplasty Median Nerve At Carpal Tunnel    CORONARY ANGIOPLASTY  07/14/2017    PTCA    GASTRIC BYPASS  07/14/2017    Gastric Surgery For Morbid Obesity Gastric Bypass     Family History   Problem Relation Name Age of Onset    Coronary artery disease Mother      Coronary artery disease Father      Coronary artery disease Brother       Social History     Tobacco Use    Smoking status: Former     Current packs/day: 0.00     Types: Cigarettes     Quit  date:      Years since quittin.3    Smokeless tobacco: Former   Vaping Use    Vaping status: Never Used   Substance Use Topics    Alcohol use: Not Currently    Drug use: Never       Physical Exam   ED Triage Vitals   Temperature Heart Rate Respirations BP   25   36.3 °C (97.3 °F) 78 16 116/73      Pulse Ox Temp Source Heart Rate Source Patient Position   25 --   96 % Temporal Monitor       BP Location FiO2 (%)     -- --             Physical Exam      ED Course & MDM   Diagnoses as of 25   Chest pain, unspecified type   Acute kidney injury                 No data recorded     Sudhir Coma Scale Score: 15 (25 : Sarah North RN)                           Medical Decision Making      Procedure  Procedures     Scott Esparza DO  25

## 2025-04-14 NOTE — PROGRESS NOTES
Manish Lance is a 64 y.o. male on day 0 of admission presenting with Substernal chest pain relieved by nitroglycerin.    Past Medical History:   Diagnosis Date    Diabetes mellitus (Multi)     Personal history of other diseases of the circulatory system 10/16/2020    History of heart disease    Personal history of other diseases of the circulatory system 10/16/2020    History of hypertension     Past Surgical History:   Procedure Laterality Date    BACK SURGERY  2017    Back Surgery    CARPAL TUNNEL RELEASE  2017    Neuroplasty Median Nerve At Carpal Tunnel    CORONARY ANGIOPLASTY  2017    PTCA    GASTRIC BYPASS  2017    Gastric Surgery For Morbid Obesity Gastric Bypass     Social History     Socioeconomic History    Marital status:      Spouse name: Not on file    Number of children: Not on file    Years of education: Not on file    Highest education level: Not on file   Occupational History    Not on file   Tobacco Use    Smoking status: Former     Current packs/day: 0.00     Types: Cigarettes     Quit date:      Years since quittin.3    Smokeless tobacco: Former   Vaping Use    Vaping status: Never Used   Substance and Sexual Activity    Alcohol use: Not Currently    Drug use: Never    Sexual activity: Not on file   Other Topics Concern    Not on file   Social History Narrative    Not on file     Social Drivers of Health     Financial Resource Strain: Low Risk  (2025)    Overall Financial Resource Strain (CARDIA)     Difficulty of Paying Living Expenses: Not hard at all   Food Insecurity: No Food Insecurity (2025)    Hunger Vital Sign     Worried About Running Out of Food in the Last Year: Never true     Ran Out of Food in the Last Year: Never true   Transportation Needs: No Transportation Needs (2025)    PRAPARE - Transportation     Lack of Transportation (Medical): No     Lack of Transportation (Non-Medical): No   Physical Activity: Patient Declined  (4/14/2025)    Exercise Vital Sign     Days of Exercise per Week: Patient declined     Minutes of Exercise per Session: Patient declined   Stress: Patient Declined (4/14/2025)    [employer] - Occupational Stress Questionnaire     Feeling of Stress : Patient declined   Social Connections: Patient Declined (4/14/2025)    Social Connection and Isolation Panel [NHANES]     Frequency of Communication with Friends and Family: Patient declined     Frequency of Social Gatherings with Friends and Family: Patient declined     Attends Church Services: Patient declined     Active Member of Clubs or Organizations: Patient declined     Attends Club or Organization Meetings: Patient declined     Marital Status: Patient declined   Intimate Partner Violence: Not At Risk (4/14/2025)    Humiliation, Afraid, Rape, and Kick questionnaire     Fear of Current or Ex-Partner: No     Emotionally Abused: No     Physically Abused: No     Sexually Abused: No   Housing Stability: Low Risk  (4/14/2025)    Housing Stability Vital Sign     Unable to Pay for Housing in the Last Year: No     Number of Times Moved in the Last Year: 0     Homeless in the Last Year: No     No Known Allergies    Dietary Orders (From admission, onward)               May Participate in Room Service  Once        Question:  .  Answer:  Yes        Adult diet Clear Liquid  Diet effective now        Question:  Diet type  Answer:  Clear Liquid                      Objective     Vitals  Temp:  [35.8 °C (96.4 °F)-36.3 °C (97.3 °F)] 36.2 °C (97.1 °F)  Heart Rate:  [53-78] 53  Resp:  [7-18] 18  BP: (115-159)/(73-90) 152/86       0-10 (Numeric) Pain Score: 0 - No pain         Peripheral IV 04/13/25 18 G Distal;Right Forearm (Active)   Number of days: 1       Vent Settings       Intake/Output Summary (Last 24 hours) at 4/14/2025 0905  Last data filed at 4/14/2025 0738  Gross per 24 hour   Intake 1202.5 ml   Output 0 ml   Net 1202.5 ml       Relevant  Results                       Assessment & Plan  Substernal chest pain relieved by nitroglycerin  Suspicious for chronic stable angina, ?  symptomatic severe AV stenosis.  Acute MI excluded with serial negative troponin.  Will observe patient on telemetry, check TTE & continue IV fluid resuscitation if positive orthostatic vital signs.  Sublingual nitroglycerin and morphine as needed for recurrence of chest pain.  IV PPI for GI protection.  Monitor LFTs.  Cardiology consult regarding further evaluation of chest pain with inpatient stress test versus coronary angiography.  If negative cardiac workup, then GI consult regarding EGD should be considered.  Dizziness  See above  Aortic valve stenosis, moderate  See above  CAD in native artery  Resume usual cardiac medications as tolerated especially DAPT, high intensity statin, beta-blocker .  CRISSY (acute kidney injury)  Avoid nephrotoxic agents especially NSAIDs.  Check serum CK ? pigment nephropathy.  Monitor renal function and consult nephrology if worse despite IV fluid resuscitation.  Hyponatremia  Due to hypovolemia from dehydration.  Give IV fluid and monitor serum electrolytes.  Type 2 diabetes mellitus with hyperglycemia, with long-term current use of insulin  Hold metformin with lispro sliding scale coverage for now & basal insulin.    Assessment/Plan          S/  Seen and examined  No new complaints no new event over the night   ROS: Negative    O/   General Appearance: Alert, Oriented X3, Cooperative, Not in Acute Distress  HEENT: no eye redness, not pale, no jaundice, Throat: not congested, no ulcers    Chest: Good AE bilateral, No crackles, no ronchies, no wheezes.   Heart: RHR, Normal heart sounds S1, S2, no murmur or gallop,   Abdomen: Soft, lax, no hepatosplenomegaly, intact hernia orifices, negative biggs sign, no tenderness,   Genitalia: no abnormal discharge, no ulcers, no swelling.  Lymphatics: no lymphadenopathy, supraclavicular, inguinal trochlear,  "or axilla.   Neurology: Intact cranial nerves 2 to 12, intact sensation, intact motor function (Power, tone and reflexes). Normal DTR reflexes.   Extremities: no signs of PAD, no swelling no ulcers   Back: no deformity, no SI tenderness, no ulcers.   Skin: no signs of dehydration, no Rash, Bruises, or purpura.      AS:  Mr. Manish Lance is a 64 y.o. male presenting with substernal chest pain and dizziness.   With Pmhx of hypertension, IDDM 2, hyperlipidemia, CAD s/p \" 9 stents\", moderate aortic valve stenosis, He denies any recent fever, chills, headache, neck stiffness, acute confusion, cough, shortness of breath, nausea, vomiting or diarrhea. He has been afebrile and hemodynamically stable since his ED arrival. Pertinent ED labs included serum sodium 134, BUN 23, creatinine 1.69, glucose 217, BNP 53, serially negative HS troponin x 2, WBC 8.0, hemoglobin 13.7, platelets 166, UA negative for pyuria. Portable chest x-ray was negative for acute process. Admitted for evalaution and management, cardiology on board.  S/p stress test/ pending results   #. Chest pain r/o ACS  - chest pain on rest.  - EKG SR, no new significna t Stt changes.  - Trop negative  - cardiology on board.  - BB and ACE as tolerated  - c/w ASA, Statin  #. Co- Morbidities:  - HTN,HLP,   - stable   - continue monitoring  - resume home meds as tolerated.    Code Status: Full  DVT ppx: heparin    Disp: after cards clear      Spoke to my patient, Discussed the medical, social conditions, the reason for this admission explained our plan and recommendations.  Time spent on the assessment of patient, gathering and interpreting data, review of medical record/patient history, personally reviewing radiographic imaging. With greater than 50% spent in personal discussion with patient.  Time > 45 min       Skyler Pulido MD    "

## 2025-04-14 NOTE — ASSESSMENT & PLAN NOTE
Avoid nephrotoxic agents especially NSAIDs.  Check serum CK ? pigment nephropathy.  Monitor renal function and consult nephrology if worse despite IV fluid resuscitation.

## 2025-04-14 NOTE — ED TRIAGE NOTES
Pt presents to the ED for fainting, pt states last few days he has also had chest pain, painful/trouble urinating, burning in his scalp/itching. Pt states he is treated for hypertension as well. Pt is diabetic,  on ED arrival in triage.

## 2025-04-14 NOTE — H&P
"History Of Present Illness  Manish Lance is a 64 y.o. male presenting with substernal chest pain and dizziness.    64-year-old gentleman whose past medical history is remarkable for hypertension, IDDM 2, hyperlipidemia, CAD s/p \" 9 heart stents\", moderate aortic valve stenosis from TTE 10/5/2024.  He denies cigarette smoking, alcohol abuse or illicit drug use.  His cardiologist is Dr. Castillo.  Patient reports his last coronary angiography and stress test being from 3 years ago.  He was hospitalized at this facility few months ago due to similar complaints of chest pain and dizziness.  He was sitting at home tonight when he experienced another onset of substernal chest pain, described as burning discomfort similar to heartburn (hence he did not take his PRN SL nitroglycerin), & associated with dizziness.  Due to the frequency of the symptoms, he sought medical attention at this hospital's ED.  He denies any recent fever, chills, headache, neck stiffness, acute confusion, cough, shortness of breath, nausea, vomiting or diarrhea.  He has been afebrile and hemodynamically stable since his ED arrival.  Pertinent ED labs included serum sodium 134, BUN 23, creatinine 1.69, glucose 217, BNP 53, serially negative HS troponin x 2, WBC 8.0, hemoglobin 13.7, platelets 166, UA negative for pyuria.  Portable chest x-ray was negative for acute process.  Patient became chest pain-free at the ED after receiving 1 sublingual nitroglycerin tablet; he also received a liter NS IV bolus and full dose chewed aspirin.  His hospitalization has been sought for further evaluation of substernal chest pain & management of CRISSY.    A 10+ point systems review was obtained and is as above, otherwise negative.     Past Medical History  Past Medical History:   Diagnosis Date    Diabetes mellitus (Multi)     Personal history of other diseases of the circulatory system 10/16/2020    History of heart disease    Personal history of other diseases of " "the circulatory system 10/16/2020    History of hypertension       Surgical History  Past Surgical History:   Procedure Laterality Date    BACK SURGERY  07/14/2017    Back Surgery    CARPAL TUNNEL RELEASE  07/14/2017    Neuroplasty Median Nerve At Carpal Tunnel    CORONARY ANGIOPLASTY  07/14/2017    PTCA    GASTRIC BYPASS  07/14/2017    Gastric Surgery For Morbid Obesity Gastric Bypass        Social History  He reports that he quit smoking about 30 years ago. His smoking use included cigarettes. He has quit using smokeless tobacco. He reports that he does not currently use alcohol. He reports that he does not use drugs.    Family History  Family History   Problem Relation Name Age of Onset    Coronary artery disease Mother      Coronary artery disease Father      Coronary artery disease Brother          Allergies  Patient has no known allergies.    Review of Systems  See HPI     Physical Exam  Constitutional: In no overt distress  HEENT: Oral mucosa dry, not pale or jaundice  Neck: Supple, no JVD  Respiratory: Clear lungs  Cardiovascular: S1-S2 audible regular, grade 4/6 precordial murmur present  GI: Soft nontender abdomen, bowel sounds present  Musculoskeletal: No pedal edema  Skin: No suspicious rash  Psych: Appropriately oriented  Neuro: Awake, alert, verbally responsive and follows commands, moves all extremities equally, essentially nonfocal    Last Recorded Vitals  Blood pressure 130/90, pulse 60, temperature 36.3 °C (97.3 °F), temperature source Temporal, resp. rate (!) 7, height 1.778 m (5' 10\"), weight 74.8 kg (165 lb), SpO2 98%.    Relevant Results  See HPI     Assessment/Plan   Assessment & Plan  Substernal chest pain relieved by nitroglycerin  Suspicious for chronic stable angina, ?  symptomatic severe AV stenosis.  Acute MI excluded with serial negative troponin.  Will observe patient on telemetry, check TTE & continue IV fluid resuscitation if positive orthostatic vital signs.  Sublingual " nitroglycerin and morphine as needed for recurrence of chest pain.  IV PPI for GI protection.  Monitor LFTs.  Cardiology consult regarding further evaluation of chest pain with inpatient stress test versus coronary angiography.  If negative cardiac workup, then GI consult regarding EGD should be considered.  Dizziness  See above  Aortic valve stenosis, moderate  See above  CAD in native artery  Resume usual cardiac medications as tolerated especially DAPT, high intensity statin, beta-blocker .  CRISSY (acute kidney injury)  Avoid nephrotoxic agents especially NSAIDs.  Check serum CK ? pigment nephropathy.  Monitor renal function and consult nephrology if worse despite IV fluid resuscitation.  Hyponatremia  Due to hypovolemia from dehydration.  Give IV fluid and monitor serum electrolytes.  Type 2 diabetes mellitus with hyperglycemia, with long-term current use of insulin  Hold metformin with lispro sliding scale coverage for now & basal insulin.           I spent 75 minutes in the professional and overall care of this patient.      Saul Bass MD

## 2025-04-14 NOTE — CONSULTS
"Inpatient consult to Cardiology  Consult performed by: Alexis Castillo MD  Consult ordered by: Saul Bass MD        History Of Present Illness:    Manish Lance is a 64 y.o. male with past medical history is remarkable for hypertension, IDDM 2, hyperlipidemia, CAD s/p \" 9 heart stents\", moderate aortic valve stenosis from TTE 10/5/2024. He denies cigarette smoking, alcohol abuse or illicit drug use. His cardiologist is Dr. Castillo. Patient reports his last coronary angiography and stress test being from 3 years ago. He was hospitalized at this facility few months ago due to similar complaints of chest pain and dizziness. He was sitting at home tonight when he experienced another onset of substernal chest pain, described as burning discomfort similar to heartburn (hence he did not take his PRN SL nitroglycerin), & associated with dizziness. Due to the frequency of the symptoms, he sought medical attention at this hospital's ED. He denies any recent fever, chills, headache, neck stiffness, acute confusion, cough, shortness of breath, nausea, vomiting or diarrhea. He has been afebrile and hemodynamically stable since his ED arrival. Pertinent ED labs included serum sodium 134, BUN 23, creatinine 1.69, glucose 217, BNP 53, serially negative HS troponin x 2, WBC 8.0, hemoglobin 13.7, platelets 166, UA negative for pyuria. Portable chest x-ray was negative for acute process. Patient became chest pain-free at the ED after receiving 1 sublingual nitroglycerin tablet; he also received a liter NS IV bolus and full dose chewed aspirin. His hospitalization has been sought for further evaluation of substernal chest pain & management of CRISSY.      Last Recorded Vitals:  Vitals:    04/14/25 0200 04/14/25 0302 04/14/25 0335 04/14/25 0527   BP: 129/81 159/84 124/74 152/86   BP Location:   Left arm Left arm   Patient Position:   Lying Lying   Pulse: 60 60 63 53   Resp: (!) 8 14 17 18   Temp:   35.8 °C (96.4 °F) 36.2 °C (97.1 " °F)   TempSrc:   Temporal Temporal   SpO2:  98% 97% 95%   Weight:       Height:           Last Labs:  CBC - 4/14/2025:  6:05 AM  6.7 13.0 129    40.4      CMP - 4/14/2025:  6:05 AM  8.2 5.6 16 --- 0.5   4.1 3.5 29 56      PTT - No results in last year.  0.9   10.4 _     Troponin I, High Sensitivity   Date/Time Value Ref Range Status   04/13/2025 11:45 PM 3 0 - 20 ng/L Final   04/13/2025 10:09 PM 4 0 - 20 ng/L Final   01/30/2025 03:18 AM 6 0 - 20 ng/L Final     BNP   Date/Time Value Ref Range Status   04/13/2025 10:09 PM 53 0 - 99 pg/mL Final   01/30/2025 03:18 AM 27 0 - 99 pg/mL Final     Hemoglobin A1C   Date/Time Value Ref Range Status   10/28/2023 05:05 AM 10.3 (H) see below % Final   04/27/2023 05:10 PM 8.4 (A) % Final     Comment:          Diagnosis of Diabetes-Adults   Non-Diabetic: < or = 5.6%   Increased risk for developing diabetes: 5.7-6.4%   Diagnostic of diabetes: > or = 6.5%  .       Monitoring of Diabetes                Age (y)     Therapeutic Goal (%)   Adults:          >18           <7.0   Pediatrics:    13-18           <7.5                   7-12           <8.0                   0- 6            7.5-8.5   American Diabetes Association. Diabetes Care 33(S1), Jan 2010.     02/11/2020 01:15 PM 7.5 % Final     Comment:          Diagnosis of Diabetes-Adults   Non-Diabetic: < or = 5.6%   Increased risk for developing diabetes: 5.7-6.4%   Diagnostic of diabetes: > or = 6.5%  .       Monitoring of Diabetes                Age (y)     Therapeutic Goal (%)   Adults:          >18           <7.0   Pediatrics:    13-18           <7.5                   7-12           <8.0                   0- 6            7.5-8.5   American Diabetes Association. Diabetes Care 33(S1), Jan 2010.       LDL Calculated   Date/Time Value Ref Range Status   10/28/2023 05:05 AM 40 <=99 mg/dL Final     Comment:                                 Near   Borderline      AGE      Desirable  Optimal    High     High     Very High     0-19 Y     0  - 109     ---    110-129   >/= 130     ----    20-24 Y     0 - 119     ---    120-159   >/= 160     ----      >24 Y     0 -  99   100-129  130-159   160-189     >/=190       VLDL   Date/Time Value Ref Range Status   10/28/2023 05:05 AM 15 0 - 40 mg/dL Final   05/26/2022 09:10 AM 18 0 - 40 mg/dL Final   03/08/2021 07:51 AM 32 0 - 40 mg/dL Final   01/18/2021 08:26 AM 53 (H) 0 - 40 mg/dL Final      Last I/O:  I/O last 3 completed shifts:  In: 1202.5 (16.1 mL/kg) [I.V.:202.5 (2.7 mL/kg); IV Piggyback:1000]  Out: - (0 mL/kg)   Weight: 74.8 kg     Past Cardiology Tests (Last 3 Years):  EKG:  ECG 12 Lead 04/13/2025 (Preliminary)      ECG 12 lead 01/30/2025      ECG 12 Lead       ECG 12 lead 10/04/2024      ECG 12 lead 09/16/2024      ECG 12 lead 08/22/2024      ECG 12 lead 08/19/2024      ECG 12 Lead 12/18/2023    Echo:  Transthoracic Echo (TTE) Complete 10/05/2024      Transthoracic Echo (TTE) Complete 10/28/2023    Ejection Fractions:  EF   Date/Time Value Ref Range Status   10/05/2024 10:57 AM 62 %      Cath:  No results found for this or any previous visit from the past 1095 days.    Stress Test:  No results found for this or any previous visit from the past 1095 days.    Cardiac Imaging:  No results found for this or any previous visit from the past 1095 days.      Past Medical History:  He has a past medical history of Diabetes mellitus (Multi), Personal history of other diseases of the circulatory system (10/16/2020), and Personal history of other diseases of the circulatory system (10/16/2020).    Past Surgical History:  He has a past surgical history that includes Coronary angioplasty (07/14/2017); Gastric bypass (07/14/2017); Back surgery (07/14/2017); and Carpal tunnel release (07/14/2017).      Social History:  He reports that he quit smoking about 30 years ago. His smoking use included cigarettes. He has quit using smokeless tobacco. He reports that he does not currently use alcohol. He reports that he does  not use drugs.    Family History:  Family History   Problem Relation Name Age of Onset    Coronary artery disease Mother      Coronary artery disease Father      Coronary artery disease Brother          Allergies:  Patient has no known allergies.    Inpatient Medications:  Scheduled medications   Medication Dose Route Frequency    allopurinol  300 mg oral Daily    aspirin  81 mg oral Daily    atorvastatin  40 mg oral Nightly    buPROPion XL  150 mg oral q AM    cholecalciferol  50 mcg oral Daily    ezetimibe  10 mg oral Nightly    finasteride  5 mg oral Daily    fluticasone  2 spray Each Nostril Nightly    heparin (porcine)  5,000 Units subcutaneous q8h RUBÉN    insulin glargine  29 Units subcutaneous q AM    insulin lispro  0-10 Units subcutaneous TID AC    metoprolol tartrate  12.5 mg oral BID    pantoprazole  40 mg intravenous Nightly    polyethylene glycol  17 g oral Daily    ticagrelor  90 mg oral BID    venlafaxine XR  150 mg oral Daily     PRN medications   Medication    albuterol    alum-mag hydroxide-simeth    bisacodyl    bisacodyl    dextrose    dextrose    glucagon    glucagon    guaiFENesin    melatonin    morphine    nitroglycerin    ondansetron ODT    Or    ondansetron     Continuous Medications   Medication Dose Last Rate    sodium chloride 0.9%  75 mL/hr 75 mL/hr (04/14/25 0643)     Outpatient Medications:  Current Outpatient Medications   Medication Instructions    albuterol (ProAir HFA) 90 mcg/actuation inhaler INHALE 1-2 PUFFS QID PRN SHORTNESS OR BREATH/WHEEZING    allopurinol (ZYLOPRIM) 300 mg, Daily    aspirin-acetaminophen-caffeine (Excedrin Migraine) 250-250-65 mg tablet 1 tablet, As needed    aspirin 81 mg, Daily    atorvastatin (LIPITOR) 40 mg, Nightly    buPROPion XL (WELLBUTRIN XL) 150 mg, Every morning    cholecalciferol (Vitamin D-3) 50 MCG (2000 UT) tablet 1 tablet, oral, Daily    empagliflozin (JARDIANCE) 25 mg, Daily    ezetimibe (Zetia) 10 mg tablet 1 tablet, Daily    ferrous sulfate  325 mg, oral, 3 times daily (morning, midday, late afternoon), Do not crush, chew, or split.    finasteride (PROSCAR) 5 mg, oral, Daily, Do not crush, chew, or split.    fluticasone (Flonase) 50 mcg/actuation nasal spray 2 sprays, Each Nostril, Nightly    insulin glargine (LANTUS U-100 INSULIN) 29 Units, Every morning    metFORMIN (Glucophage) 1,000 mg tablet 1 tablet, Every 12 hours    metoprolol tartrate (LOPRESSOR) 12.5 mg, oral, 2 times daily, Please do not restart this medication until instructed to do so by your cardiologist    midodrine (PROAMATINE) 2.5 mg, oral, 3 times daily (morning, midday, late afternoon)    naproxen (NAPROSYN) 375 mg, oral, 2 times daily PRN    nitroglycerin (NITROSTAT) 0.4 mg, Every 5 min PRN    omeprazole (PriLOSEC) 20 mg DR capsule 1 capsule, 2 times daily    pregabalin (LYRICA) 300 mg, oral, 2 times daily    ticagrelor (BRILINTA) 90 mg, oral, 2 times daily    venlafaxine XR (EFFEXOR-XR) 150 mg, oral, Daily, Do not crush or chew.       Physical Exam:  Constitutional: In no overt distress  HEENT: Oral mucosa dry, not pale or jaundice  Neck: Supple, no JVD  Respiratory: Clear lungs  Cardiovascular: S1-S2 audible regular, grade 4/6 precordial murmur present  GI: Soft nontender abdomen, bowel sounds present  Musculoskeletal: No pedal edema  Skin: No suspicious rash  Psych: Appropriately oriented  Neuro: Awake, alert, verbally responsive and follows commands, moves all extremities equally, essentially nonfocal     Assessment/Plan   1) Chest Pain  Troponins are negative  Plan for checking Dobutamine stress echo       Code Status:  Full Code    I spent 30 minutes in the professional and overall care of this patient.        Alexis Castillo MD

## 2025-04-14 NOTE — ASSESSMENT & PLAN NOTE
Suspicious for chronic stable angina, ?  symptomatic severe AV stenosis.  Acute MI excluded with serial negative troponin.  Will observe patient on telemetry, check TTE & continue IV fluid resuscitation if positive orthostatic vital signs.  Sublingual nitroglycerin and morphine as needed for recurrence of chest pain.  IV PPI for GI protection.  Monitor LFTs.  Cardiology consult regarding further evaluation of chest pain with inpatient stress test versus coronary angiography.  If negative cardiac workup, then GI consult regarding EGD should be considered.

## 2025-04-14 NOTE — ED NOTES
Pt arrives to ED via car from home    Code Status:  Full Code    HPI     Chief Complaint   Patient presents with    Dizziness    Chest Pain    Difficulty Urinating       /84   Pulse 60   Temp 36.3 °C (97.3 °F) (Temporal)   Resp 14   Wt 74.8 kg (165 lb)   SpO2 98%     Sudhir Coma Scale Score: 15      LDA:   Peripheral IV 04/13/25 18 G Distal;Right Forearm (Active)   Placement Date/Time: 04/13/25 2200   Size (Gauge): 18 G  Orientation: Distal;Right  Location: Forearm   Number of days: 0        BACKGROUND  Past Medical History:   Diagnosis Date    Diabetes mellitus (Multi)     Personal history of other diseases of the circulatory system 10/16/2020    History of heart disease    Personal history of other diseases of the circulatory system 10/16/2020    History of hypertension     Past Surgical History:   Procedure Laterality Date    BACK SURGERY  07/14/2017    Back Surgery    CARPAL TUNNEL RELEASE  07/14/2017    Neuroplasty Median Nerve At Carpal Tunnel    CORONARY ANGIOPLASTY  07/14/2017    PTCA    GASTRIC BYPASS  07/14/2017    Gastric Surgery For Morbid Obesity Gastric Bypass     No current facility-administered medications on file prior to encounter.     Current Outpatient Medications on File Prior to Encounter   Medication Sig Dispense Refill    albuterol (ProAir HFA) 90 mcg/actuation inhaler INHALE 1-2 PUFFS QID PRN SHORTNESS OR BREATH/WHEEZING      allopurinol (Zyloprim) 300 mg tablet Take 1 tablet (300 mg) by mouth once daily.      aspirin 81 mg EC tablet Take 1 tablet (81 mg) by mouth once daily.      aspirin-acetaminophen-caffeine (Excedrin Migraine) 250-250-65 mg tablet Take 1 tablet by mouth if needed for headaches.      atorvastatin (Lipitor) 40 mg tablet Take 1 tablet (40 mg) by mouth once daily at bedtime.      buPROPion XL (Wellbutrin XL) 150 mg 24 hr tablet Take 1 tablet (150 mg) by mouth once daily in the morning. Do not crush, chew, or split.      cholecalciferol (Vitamin D-3) 50 MCG (2000  UT) tablet Take 1 tablet (2,000 Units) by mouth once daily.      empagliflozin (Jardiance) 25 mg Take 1 tablet (25 mg) by mouth once daily.      ezetimibe (Zetia) 10 mg tablet Take 1 tablet (10 mg) by mouth once daily.      ferrous sulfate 325 (65 Fe) MG EC tablet Take 1 tablet by mouth 3 times daily (morning, midday, late afternoon). Do not crush, chew, or split.      finasteride (Proscar) 5 mg tablet Take 1 tablet (5 mg) by mouth once daily. Do not crush, chew, or split. 30 tablet 11    fluticasone (Flonase) 50 mcg/actuation nasal spray Administer 2 sprays into each nostril once daily at bedtime.      insulin glargine (Lantus U-100 Insulin) 100 unit/mL injection Inject 29 Units under the skin once daily in the morning.      metFORMIN (Glucophage) 1,000 mg tablet Take 1 tablet (1,000 mg) by mouth every 12 hours.      metoprolol tartrate (Lopressor) 25 mg tablet Take 0.5 tablets (12.5 mg) by mouth 2 times a day. Please do not restart this medication until instructed to do so by your cardiologist      midodrine (Proamatine) 2.5 mg tablet Take 1 tablet (2.5 mg) by mouth 3 times daily (morning, midday, late afternoon). 90 tablet 0    naproxen (Naprosyn) 375 mg tablet Take 1 tablet (375 mg) by mouth 2 times a day as needed.      nitroglycerin (Nitrostat) 0.4 mg SL tablet Place 1 tablet (0.4 mg) under the tongue every 5 minutes if needed for chest pain (MAX THREE TABS PER EPISODE). PLACE 1 TABLET UNDER THE TONGUE EVERY 5 MINUTES FOR UP TO 3 DOSES AS NEEDED FOR CHEST PAIN.CALL 911 IF PAIN PERSISTS.      omeprazole (PriLOSEC) 20 mg DR capsule Take 1 capsule (20 mg) by mouth twice a day.      pregabalin (Lyrica) 300 mg capsule Take 1 capsule (300 mg) by mouth 2 times a day.      ticagrelor (Brilinta) 90 mg tablet Take 1 tablet (90 mg) by mouth 2 times a day.      venlafaxine XR (Effexor-XR) 150 mg 24 hr capsule Take 1 capsule (150 mg) by mouth once daily. Do not crush or chew.          ASSESSMENT  Diagnoses as of 04/14/25  0304   Chest pain, unspecified type   Acute kidney injury       Medications Currently Running:  sodium chloride 0.9%, 75 mL/hr         Medications Given:  ED Medication Administration from 04/13/2025 2106 to 04/14/2025 0304         Date/Time Order Dose Route Action Action by     04/13/2025 2218 EDT sodium chloride 0.9 % bolus 500 mL 500 mL intravenous New Bag Cecilia, D     04/13/2025 2219 EDT aspirin chewable tablet 324 mg 324 mg oral Given Cecilia, D     04/13/2025 2219 EDT nitroglycerin (Nitrostat) SL tablet 0.4 mg 0.4 mg sublingual Given Cecilia, D     04/13/2025 2318 EDT sodium chloride 0.9 % bolus 500 mL 0 mL intravenous Stopped Cecilia, D     04/14/2025 0010 EDT sodium chloride 0.9 % bolus 500 mL 500 mL intravenous New Bag Cecilia, D     04/14/2025 0040 EDT sodium chloride 0.9 % bolus 500 mL 0 mL intravenous Stopped Cecilia, D                 RESULTS    Imaging:  XR chest 1 view   Final Result   1.  No evidence of acute cardiopulmonary process.                  MACRO:   None        Signed by: Armando Jacobo 4/13/2025 11:41 PM   Dictation workstation:   [id number]      Transthoracic Echo (TTE) Complete    (Results Pending)      }  Labs ::99  Abnormal Labs Reviewed   COMPREHENSIVE METABOLIC PANEL - Abnormal; Notable for the following components:       Result Value    Glucose 217 (*)     Sodium 134 (*)     Creatinine 1.69 (*)     eGFR 45 (*)     Total Protein 6.2 (*)     All other components within normal limits   URINALYSIS WITH REFLEX CULTURE AND MICROSCOPIC - Abnormal; Notable for the following components:    Glucose, Urine OVER (4+) (*)     All other components within normal limits    Narrative:     OVER is reported when the result is greater than the clinically reportable range.   POCT GLUCOSE - Abnormal; Notable for the following components:    POCT Glucose 226 (*)     All other components within normal limits                Ayleen Brooks RN  04/14/25 030

## 2025-04-15 VITALS
DIASTOLIC BLOOD PRESSURE: 90 MMHG | HEART RATE: 72 BPM | OXYGEN SATURATION: 95 % | WEIGHT: 165 LBS | TEMPERATURE: 97 F | RESPIRATION RATE: 16 BRPM | BODY MASS INDEX: 23.62 KG/M2 | HEIGHT: 70 IN | SYSTOLIC BLOOD PRESSURE: 165 MMHG

## 2025-04-15 LAB
CHOLEST SERPL-MCNC: 129 MG/DL (ref 0–199)
CHOLESTEROL/HDL RATIO: 3.1
ERYTHROCYTE [DISTWIDTH] IN BLOOD BY AUTOMATED COUNT: 14.6 % (ref 11.5–14.5)
GLUCOSE BLD MANUAL STRIP-MCNC: 121 MG/DL (ref 74–99)
GLUCOSE BLD MANUAL STRIP-MCNC: 265 MG/DL (ref 74–99)
HCT VFR BLD AUTO: 40.6 % (ref 41–52)
HDLC SERPL-MCNC: 41.1 MG/DL
HGB BLD-MCNC: 13.4 G/DL (ref 13.5–17.5)
LDLC SERPL CALC-MCNC: 59 MG/DL
MCH RBC QN AUTO: 29.1 PG (ref 26–34)
MCHC RBC AUTO-ENTMCNC: 33 G/DL (ref 32–36)
MCV RBC AUTO: 88 FL (ref 80–100)
NON HDL CHOLESTEROL: 88 MG/DL (ref 0–149)
NRBC BLD-RTO: 0 /100 WBCS (ref 0–0)
PLATELET # BLD AUTO: 136 X10*3/UL (ref 150–450)
RBC # BLD AUTO: 4.6 X10*6/UL (ref 4.5–5.9)
TRIGL SERPL-MCNC: 145 MG/DL (ref 0–149)
VLDL: 29 MG/DL (ref 0–40)
WBC # BLD AUTO: 6.3 X10*3/UL (ref 4.4–11.3)

## 2025-04-15 PROCEDURE — G0378 HOSPITAL OBSERVATION PER HR: HCPCS

## 2025-04-15 PROCEDURE — 85027 COMPLETE CBC AUTOMATED: CPT | Performed by: INTERNAL MEDICINE

## 2025-04-15 PROCEDURE — 99239 HOSP IP/OBS DSCHRG MGMT >30: CPT | Performed by: INTERNAL MEDICINE

## 2025-04-15 PROCEDURE — 36415 COLL VENOUS BLD VENIPUNCTURE: CPT | Performed by: INTERNAL MEDICINE

## 2025-04-15 PROCEDURE — 96372 THER/PROPH/DIAG INJ SC/IM: CPT | Performed by: INTERNAL MEDICINE

## 2025-04-15 PROCEDURE — 2500000001 HC RX 250 WO HCPCS SELF ADMINISTERED DRUGS (ALT 637 FOR MEDICARE OP): Performed by: INTERNAL MEDICINE

## 2025-04-15 PROCEDURE — 2500000001 HC RX 250 WO HCPCS SELF ADMINISTERED DRUGS (ALT 637 FOR MEDICARE OP): Performed by: REGISTERED NURSE

## 2025-04-15 PROCEDURE — 99232 SBSQ HOSP IP/OBS MODERATE 35: CPT | Performed by: NURSE PRACTITIONER

## 2025-04-15 PROCEDURE — 2500000004 HC RX 250 GENERAL PHARMACY W/ HCPCS (ALT 636 FOR OP/ED): Performed by: INTERNAL MEDICINE

## 2025-04-15 PROCEDURE — 82947 ASSAY GLUCOSE BLOOD QUANT: CPT

## 2025-04-15 PROCEDURE — 2500000002 HC RX 250 W HCPCS SELF ADMINISTERED DRUGS (ALT 637 FOR MEDICARE OP, ALT 636 FOR OP/ED): Performed by: INTERNAL MEDICINE

## 2025-04-15 RX ADMIN — Medication 50 MCG: at 08:53

## 2025-04-15 RX ADMIN — INSULIN GLARGINE 29 UNITS: 100 INJECTION, SOLUTION SUBCUTANEOUS at 08:54

## 2025-04-15 RX ADMIN — TICAGRELOR 90 MG: 90 TABLET ORAL at 08:53

## 2025-04-15 RX ADMIN — METOPROLOL TARTRATE 12.5 MG: 25 TABLET, FILM COATED ORAL at 08:53

## 2025-04-15 RX ADMIN — ASPIRIN 81 MG: 81 TABLET, COATED ORAL at 08:53

## 2025-04-15 RX ADMIN — FINASTERIDE 5 MG: 5 TABLET, FILM COATED ORAL at 08:53

## 2025-04-15 RX ADMIN — VENLAFAXINE HYDROCHLORIDE 150 MG: 150 CAPSULE, EXTENDED RELEASE ORAL at 08:52

## 2025-04-15 RX ADMIN — BUPROPION HYDROCHLORIDE 150 MG: 150 TABLET, EXTENDED RELEASE ORAL at 08:53

## 2025-04-15 RX ADMIN — ALLOPURINOL 300 MG: 300 TABLET ORAL at 08:52

## 2025-04-15 RX ADMIN — INSULIN LISPRO 4 UNITS: 100 INJECTION, SOLUTION INTRAVENOUS; SUBCUTANEOUS at 12:41

## 2025-04-15 RX ADMIN — PREGABALIN 300 MG: 75 CAPSULE ORAL at 08:52

## 2025-04-15 RX ADMIN — HEPARIN SODIUM 5000 UNITS: 5000 INJECTION, SOLUTION INTRAVENOUS; SUBCUTANEOUS at 05:37

## 2025-04-15 ASSESSMENT — COGNITIVE AND FUNCTIONAL STATUS - GENERAL
DAILY ACTIVITIY SCORE: 24
MOBILITY SCORE: 24

## 2025-04-15 ASSESSMENT — PAIN - FUNCTIONAL ASSESSMENT: PAIN_FUNCTIONAL_ASSESSMENT: 0-10

## 2025-04-15 ASSESSMENT — PAIN SCALES - GENERAL
PAINLEVEL_OUTOF10: 0 - NO PAIN
PAINLEVEL_OUTOF10: 0 - NO PAIN

## 2025-04-15 NOTE — CARE PLAN
The patient's goals for the shift include to get some rest    The clinical goals for the shift include Maintain patient's safety and manage patient's pain throughout the shift    Over the shift, the patient did make progress toward the following goals.

## 2025-04-15 NOTE — DISCHARGE SUMMARY
Discharge Diagnosis  Substernal chest pain relieved by nitroglycerin    Issues Requiring Follow-Up  PCP follow up    Discharge Meds     Medication List      CONTINUE taking these medications     allopurinol 300 mg tablet; Commonly known as: Zyloprim   aspirin 81 mg EC tablet   aspirin-acetaminophen-caffeine 250-250-65 mg tablet; Commonly known as:   Excedrin Migraine   atorvastatin 40 mg tablet; Commonly known as: Lipitor   buPROPion  mg 24 hr tablet; Commonly known as: Wellbutrin XL   cholecalciferol 50 mcg (2,000 units) tablet; Commonly known as: Vitamin   D-3   ezetimibe 10 mg tablet; Commonly known as: Zetia   ferrous sulfate 325 (65 Fe) mg EC tablet   finasteride 5 mg tablet; Commonly known as: Proscar; Take 1 tablet (5   mg) by mouth once daily. Do not crush, chew, or split.   fluticasone 50 mcg/actuation nasal spray; Commonly known as: Flonase   Jardiance 25 mg tablet; Generic drug: empagliflozin   Lantus U-100 Insulin 100 unit/mL injection; Generic drug: insulin   glargine   metFORMIN 1,000 mg tablet; Commonly known as: Glucophage   metoprolol tartrate 25 mg tablet; Commonly known as: Lopressor; Take 0.5   tablets (12.5 mg) by mouth 2 times a day. Please do not restart this   medication until instructed to do so by your cardiologist   midodrine 2.5 mg tablet; Commonly known as: Proamatine; Take 1 tablet   (2.5 mg) by mouth 3 times daily (morning, midday, late afternoon).   naproxen 375 mg tablet; Commonly known as: Naprosyn   nitroglycerin 0.4 mg SL tablet; Commonly known as: Nitrostat   omeprazole 20 mg DR capsule; Commonly known as: PriLOSEC   pregabalin 300 mg capsule; Commonly known as: Lyrica   ProAir HFA 90 mcg/actuation inhaler; Generic drug: albuterol   ticagrelor 90 mg tablet; Commonly known as: Brilinta   venlafaxine  mg 24 hr capsule; Commonly known as: Effexor-XR       Test Results Pending At Discharge  Pending Labs       No current pending labs.            Hospital Course       "Manish Lance is a 64 y.o. male presenting with substernal chest pain and dizziness.   With Pmhx of hypertension, IDDM 2, hyperlipidemia, CAD s/p \" 9 stents\", moderate aortic valve stenosis, He denies any recent fever, chills, headache, neck stiffness, acute confusion, cough, shortness of breath, nausea, vomiting or diarrhea. He has been afebrile and hemodynamically stable since his ED arrival. Pertinent ED labs included serum sodium 134, BUN 23, creatinine 1.69, glucose 217, BNP 53, serially negative HS troponin x 2, WBC 8.0, hemoglobin 13.7, platelets 166, UA negative for pyuria. Portable chest x-ray was negative for acute process. Admitted for evalaution and management, cardiology on board.    1. Chest Pain, CAD  Troponins are negative  Plan for checking Dobutamine stress echo  4/15/25: Stress echo with no ischemia. He denies any further chest pain since admission. Continue aspirin, atorvastatin and metoprolol tartrate     2. Dyslipidemia  Recommend Mediterranean style of eating  Goal LDL <70.    Continue atorvastatin 40 mg daily.   Lipid panel ordered and is pending.      3. HTN   Elevated, but antihypertensives were stopped in the past d/t orthostatic hypotension with syncope  Continue current antihypertensives: metoprolol tartrate 12.5 mg BID     4. Aortic valve stenosis   TTE with mild aortic valve stenosis  Monitor with serial echocardiogram as outpatient.    5. DM  -resume home meds    Patient is doing much better today.  He has been cleared for discharge neurology standpoint.  Advised him to follow with his PCP as outpatient in 1 to 2 weeks.  Also follow-up with cardiology as outpatient.      Discharge time spent more than 30 minutes.    Pertinent Physical Exam At Time of Discharge  Physical Exam    Constitutional: No acute distress, awake, alert  Head/Neck: Neck supple  Respiratory/Thorax: Lungs are Clear to auscultation  Cardiovascular: Regular, rate and rhythm,  2+ equal pulses of the extremities, " normal S 1and S 2  Gastrointestinal: Nondistended, soft, non-tender, no rebound tenderness or guarding  Extremities: No edema. No calf tenderness.  Neurological: Awake and alert. No focal neurological deficits  Psychological: Appropriate mood and behavior    Outpatient Follow-Up  Future Appointments   Date Time Provider Department Center   5/28/2025  8:30 AM SULLY Reese-CNP [id number]         Tom Florian MD

## 2025-04-15 NOTE — PROGRESS NOTES
"[facility] and Vascular Cardiology  Patient Name: Manish Lance  Patient : 1960  Room/Bed: 3322/3322-A    HPI:  Manish Lance is a 64 y.o. male with past medical history is remarkable for hypertension, IDDM 2, hyperlipidemia, CAD s/p \" 9 heart stents\", moderate aortic valve stenosis from TTE 10/5/2024. He denies cigarette smoking, alcohol abuse or illicit drug use. His cardiologist is Dr. Castillo. Patient reports his last coronary angiography and stress test being from 3 years ago. He was hospitalized at this facility few months ago due to similar complaints of chest pain and dizziness. He was sitting at home tonight when he experienced another onset of substernal chest pain, described as burning discomfort similar to heartburn (hence he did not take his PRN SL nitroglycerin), & associated with dizziness. Due to the frequency of the symptoms, he sought medical attention at this hospital's ED. He denies any recent fever, chills, headache, neck stiffness, acute confusion, cough, shortness of breath, nausea, vomiting or diarrhea. He has been afebrile and hemodynamically stable since his ED arrival. Pertinent ED labs included serum sodium 134, BUN 23, creatinine 1.69, glucose 217, BNP 53, serially negative HS troponin x 2, WBC 8.0, hemoglobin 13.7, platelets 166, UA negative for pyuria. Portable chest x-ray was negative for acute process. Patient became chest pain-free at the ED after receiving 1 sublingual nitroglycerin tablet; he also received a liter NS IV bolus and full dose chewed aspirin. His hospitalization has been sought for further evaluation of substernal chest pain & management of CRISSY.     Allergies:  Patient has no known allergies.    Subjective Data:  4/15/25: sitting up in chair.  Denies any chest pain/pressure, SOB or lightheadedness. SR on telemetry.    Overnight Events:  None    Objective Data:  Vitals:    25 2112 04/15/25 0138 04/15/25 0635 04/15/25 0917   BP: 105/69 161/85 " "(!) 160/92 165/90   BP Location: Left arm Right arm Left arm Right arm   Patient Position: Sitting Lying Sitting Lying   Pulse: 71 62 62 72   Resp: 16 16 15 16   Temp: 36.7 °C (98 °F) 36.4 °C (97.6 °F) 36.6 °C (97.9 °F) 36.1 °C (97 °F)   TempSrc: Temporal Temporal Temporal Temporal   SpO2: 95% 95% 96% 95%   Weight:       Height:           Reviewed the following Labs:  Results from last 7 days   Lab Units 04/15/25  0537 04/14/25  0605 04/13/25 2209   WBC AUTO x10*3/uL 6.3 6.7 8.0   HEMOGLOBIN g/dL 13.4* 13.0* 13.7   HEMATOCRIT % 40.6* 40.4* 41.3   PLATELETS AUTO x10*3/uL 136* 129* 166       Results from last 7 days   Lab Units 04/14/25  1354 04/14/25  0605 04/13/25  2209   SODIUM mmol/L 135* 138 134*   POTASSIUM mmol/L 4.6 3.9 4.8   CHLORIDE mmol/L 106 106 100   CO2 mmol/L 24 27 22   BUN mg/dL 18 20 23   CREATININE mg/dL 1.30 1.38* 1.69*   CALCIUM mg/dL 8.2* 8.2* 8.7   PROTEIN TOTAL g/dL  --  5.6* 6.2*   BILIRUBIN TOTAL mg/dL  --  0.5 0.3   ALK PHOS U/L  --  56 82   ALT U/L  --  29 36   AST U/L  --  16 22   GLUCOSE mg/dL 245* 140* 217*       Results from last 7 days   Lab Units 04/13/25  2209   MAGNESIUM mg/dL 1.99       No results found for: \"LDLF\"     Lab Results   Component Value Date    BNP 53 04/13/2025       Lab Results   Component Value Date    TROPHS 3 04/13/2025    TROPHS 4 04/13/2025        No results found for: \"TSH\", \"FT4\"    Results from last 7 days   Lab Units 04/13/25  2209   INR  0.9     Lab Results   Component Value Date    HGBA1C 10.7 (H) 04/14/2025       Intake/Output    Intake/Output Summary (Last 24 hours) at 4/15/2025 1039  Last data filed at 4/15/2025 0917  Gross per 24 hour   Intake 3317.25 ml   Output 0 ml   Net 3317.25 ml      I/O last 2 completed shifts:  In: 2407.3 (32.2 mL/kg) [P.O.:1541; I.V.:866.3 (11.6 mL/kg)]  Out: 0 (0 mL/kg)   Weight: 74.8 kg     Past Medical History:  He has a past medical history of Diabetes mellitus (Multi), Personal history of other diseases of the circulatory " system (10/16/2020), and Personal history of other diseases of the circulatory system (10/16/2020).    Past Surgical History:  He has a past surgical history that includes Coronary angioplasty (07/14/2017); Gastric bypass (07/14/2017); Back surgery (07/14/2017); and Carpal tunnel release (07/14/2017).      Social History:  He reports that he quit smoking about 30 years ago. His smoking use included cigarettes. He has quit using smokeless tobacco. He reports that he does not currently use alcohol. He reports that he does not use drugs.    Family History:  Family History   Problem Relation Name Age of Onset    Coronary artery disease Mother      Coronary artery disease Father      Coronary artery disease Brother         Outpatient Medications  No current facility-administered medications on file prior to encounter.     Current Outpatient Medications on File Prior to Encounter   Medication Sig Dispense Refill    albuterol (ProAir HFA) 90 mcg/actuation inhaler INHALE 1-2 PUFFS QID PRN SHORTNESS OR BREATH/WHEEZING      allopurinol (Zyloprim) 300 mg tablet Take 1 tablet (300 mg) by mouth once daily.      aspirin 81 mg EC tablet Take 1 tablet (81 mg) by mouth once daily.      aspirin-acetaminophen-caffeine (Excedrin Migraine) 250-250-65 mg tablet Take 1 tablet by mouth if needed for headaches.      atorvastatin (Lipitor) 40 mg tablet Take 1 tablet (40 mg) by mouth once daily at bedtime.      buPROPion XL (Wellbutrin XL) 150 mg 24 hr tablet Take 1 tablet (150 mg) by mouth once daily in the morning. Do not crush, chew, or split.      cholecalciferol (Vitamin D-3) 50 MCG (2000 UT) tablet Take 1 tablet (2,000 Units) by mouth once daily.      empagliflozin (Jardiance) 25 mg Take 1 tablet (25 mg) by mouth once daily.      ezetimibe (Zetia) 10 mg tablet Take 1 tablet (10 mg) by mouth once daily.      ferrous sulfate 325 (65 Fe) MG EC tablet Take 1 tablet by mouth 3 times daily (morning, midday, late afternoon). Do not crush,  chew, or split.      finasteride (Proscar) 5 mg tablet Take 1 tablet (5 mg) by mouth once daily. Do not crush, chew, or split. 30 tablet 11    fluticasone (Flonase) 50 mcg/actuation nasal spray Administer 2 sprays into each nostril once daily at bedtime.      insulin glargine (Lantus U-100 Insulin) 100 unit/mL injection Inject 29 Units under the skin once daily in the morning.      metFORMIN (Glucophage) 1,000 mg tablet Take 1 tablet (1,000 mg) by mouth every 12 hours.      metoprolol tartrate (Lopressor) 25 mg tablet Take 0.5 tablets (12.5 mg) by mouth 2 times a day. Please do not restart this medication until instructed to do so by your cardiologist      midodrine (Proamatine) 2.5 mg tablet Take 1 tablet (2.5 mg) by mouth 3 times daily (morning, midday, late afternoon). 90 tablet 0    naproxen (Naprosyn) 375 mg tablet Take 1 tablet (375 mg) by mouth 2 times a day as needed.      nitroglycerin (Nitrostat) 0.4 mg SL tablet Place 1 tablet (0.4 mg) under the tongue every 5 minutes if needed for chest pain (MAX THREE TABS PER EPISODE). PLACE 1 TABLET UNDER THE TONGUE EVERY 5 MINUTES FOR UP TO 3 DOSES AS NEEDED FOR CHEST PAIN.CALL 911 IF PAIN PERSISTS.      omeprazole (PriLOSEC) 20 mg DR capsule Take 1 capsule (20 mg) by mouth twice a day.      pregabalin (Lyrica) 300 mg capsule Take 1 capsule (300 mg) by mouth 2 times a day.      ticagrelor (Brilinta) 90 mg tablet Take 1 tablet (90 mg) by mouth 2 times a day.      venlafaxine XR (Effexor-XR) 150 mg 24 hr capsule Take 1 capsule (150 mg) by mouth once daily. Do not crush or chew.         Scheduled medications  allopurinol, 300 mg, oral, Daily  aspirin, 81 mg, oral, Daily  atorvastatin, 40 mg, oral, Nightly  atropine, 0.25-2 mg, intravenous, Once in imaging  buPROPion XL, 150 mg, oral, q AM  cholecalciferol, 50 mcg, oral, Daily  ezetimibe, 10 mg, oral, Nightly  finasteride, 5 mg, oral, Daily  fluticasone, 2 spray, Each Nostril, Nightly  heparin (porcine), 5,000 Units,  subcutaneous, q8h RUBÉN  insulin glargine, 29 Units, subcutaneous, q AM  insulin lispro, 0-10 Units, subcutaneous, TID AC  metoprolol tartrate, 12.5 mg, oral, BID  pantoprazole, 40 mg, intravenous, Nightly  polyethylene glycol, 17 g, oral, Daily  pregabalin, 300 mg, oral, BID  ticagrelor, 90 mg, oral, BID  venlafaxine XR, 150 mg, oral, Daily      Continuous medications     PRN medications  PRN medications: albuterol, alum-mag hydroxide-simeth, bisacodyl, bisacodyl, dextrose, dextrose, glucagon, glucagon, guaiFENesin, melatonin, morphine, nitroglycerin, ondansetron ODT **OR** ondansetron   Medications Prior to Admission   Medication Sig Dispense Refill Last Dose/Taking    albuterol (ProAir HFA) 90 mcg/actuation inhaler INHALE 1-2 PUFFS QID PRN SHORTNESS OR BREATH/WHEEZING       allopurinol (Zyloprim) 300 mg tablet Take 1 tablet (300 mg) by mouth once daily.       aspirin 81 mg EC tablet Take 1 tablet (81 mg) by mouth once daily.       aspirin-acetaminophen-caffeine (Excedrin Migraine) 250-250-65 mg tablet Take 1 tablet by mouth if needed for headaches.       atorvastatin (Lipitor) 40 mg tablet Take 1 tablet (40 mg) by mouth once daily at bedtime.       buPROPion XL (Wellbutrin XL) 150 mg 24 hr tablet Take 1 tablet (150 mg) by mouth once daily in the morning. Do not crush, chew, or split.       cholecalciferol (Vitamin D-3) 50 MCG (2000 UT) tablet Take 1 tablet (2,000 Units) by mouth once daily.       empagliflozin (Jardiance) 25 mg Take 1 tablet (25 mg) by mouth once daily.       ezetimibe (Zetia) 10 mg tablet Take 1 tablet (10 mg) by mouth once daily.       ferrous sulfate 325 (65 Fe) MG EC tablet Take 1 tablet by mouth 3 times daily (morning, midday, late afternoon). Do not crush, chew, or split.       finasteride (Proscar) 5 mg tablet Take 1 tablet (5 mg) by mouth once daily. Do not crush, chew, or split. 30 tablet 11     fluticasone (Flonase) 50 mcg/actuation nasal spray Administer 2 sprays into each nostril once  daily at bedtime.       insulin glargine (Lantus U-100 Insulin) 100 unit/mL injection Inject 29 Units under the skin once daily in the morning.       metFORMIN (Glucophage) 1,000 mg tablet Take 1 tablet (1,000 mg) by mouth every 12 hours.       metoprolol tartrate (Lopressor) 25 mg tablet Take 0.5 tablets (12.5 mg) by mouth 2 times a day. Please do not restart this medication until instructed to do so by your cardiologist       midodrine (Proamatine) 2.5 mg tablet Take 1 tablet (2.5 mg) by mouth 3 times daily (morning, midday, late afternoon). 90 tablet 0     naproxen (Naprosyn) 375 mg tablet Take 1 tablet (375 mg) by mouth 2 times a day as needed.       nitroglycerin (Nitrostat) 0.4 mg SL tablet Place 1 tablet (0.4 mg) under the tongue every 5 minutes if needed for chest pain (MAX THREE TABS PER EPISODE). PLACE 1 TABLET UNDER THE TONGUE EVERY 5 MINUTES FOR UP TO 3 DOSES AS NEEDED FOR CHEST PAIN.CALL 911 IF PAIN PERSISTS.       omeprazole (PriLOSEC) 20 mg DR capsule Take 1 capsule (20 mg) by mouth twice a day.       pregabalin (Lyrica) 300 mg capsule Take 1 capsule (300 mg) by mouth 2 times a day.       ticagrelor (Brilinta) 90 mg tablet Take 1 tablet (90 mg) by mouth 2 times a day.       venlafaxine XR (Effexor-XR) 150 mg 24 hr capsule Take 1 capsule (150 mg) by mouth once daily. Do not crush or chew.          Reviewed the following cardiology tests:    Echo 4/14/25:   1. The left ventricular systolic function is normal, with a Nickerson's biplane calculated ejection fraction of 58%.   2. There is normal right ventricular global systolic function.   3. Mild aortic valve stenosis.   4. Mild aortic valve regurgitation.  EF   Date/Time Value Ref Range Status   04/14/2025 12:39 PM 58 %    10/05/2024 10:57 AM 62 %       Stress echo 4/14/25  1. Adequate level of stress achieved.   2. No clinical, echocardiographic or electrocardiographic evidence for ischemia at maximal infusion.    Physical Exam:  Vitals reviewed.    Constitutional:       Appearance: Healthy appearance.   Pulmonary:      Effort: Pulmonary effort is normal.      Breath sounds: Normal breath sounds.   Cardiovascular:      PMI at left midclavicular line. Normal rate. Regular rhythm. S1 with normal intensity. S2 with normal intensity.       Murmurs: There is a systolic murmur.   Edema:     Peripheral edema absent.   Abdominal:      General: Bowel sounds are normal.   Skin:     General: Skin is warm and dry.   Psychiatric:         Attention and Perception: Attention normal.         Mood and Affect: Mood normal.         Behavior: Behavior is cooperative.         Assessment/Plan:   1) Chest Pain, CAD  Troponins are negative  Plan for checking Dobutamine stress echo  4/15/25: Stress echo with no ischemia. He denies any further chest pain since admission. Continue aspirin, atorvastatin and metoprolol tartrate    2. Dyslipidemia  Recommend Mediterranean style of eating  Goal LDL <70.    Continue atorvastatin 40 mg daily.   Lipid panel ordered and is pending.     3. HTN   Elevated, but antihypertensives were stopped in the past d/t orthostatic hypotension with syncope  Continue current antihypertensives: metoprolol tartrate 12.5 mg BID    4. Aortic valve stenosis   TTE with mild aortic valve stenosis  Monitor with serial echocardiogram as outpatient.    Okay from cardiac standpoint to d/c  He has f/u with me next month which he should keep.      Code Status  Full Code      Yasmin Gore, APRN-CNP

## 2025-04-15 NOTE — NURSING NOTE
Pt discharged to home.  IV and TELE removed.  No meds to bed.  Belongings with patient.  Instructions provided and understood.

## 2025-04-16 LAB
ATRIAL RATE: 71 BPM
P AXIS: 52 DEGREES
PR INTERVAL: 164 MS
Q ONSET: 251 MS
QRS COUNT: 11 BEATS
QRS DURATION: 90 MS
QT INTERVAL: 385 MS
QTC CALCULATION(BAZETT): 416 MS
QTC FREDERICIA: 405 MS
R AXIS: 24 DEGREES
T AXIS: 41 DEGREES
T OFFSET: 444 MS
VENTRICULAR RATE: 70 BPM

## 2025-06-02 PROBLEM — Z01.810 PREOPERATIVE CARDIOVASCULAR EXAMINATION: Status: ACTIVE | Noted: 2025-06-02

## 2025-06-02 ASSESSMENT — ENCOUNTER SYMPTOMS
NEAR-SYNCOPE: 0
FEVER: 0
PALPITATIONS: 0
IRREGULAR HEARTBEAT: 0
CHILLS: 0
ORTHOPNEA: 0
VOMITING: 0
DYSPNEA ON EXERTION: 0
SYNCOPE: 0
COUGH: 0
WHEEZING: 0
HEMATURIA: 0
NAUSEA: 0
HEMATOCHEZIA: 0
SHORTNESS OF BREATH: 0
ALTERED MENTAL STATUS: 0

## 2025-06-02 NOTE — PATIENT INSTRUCTIONS
Recommend Mediterranean style of eating  Continue current medications  Follow-up with Dr. Castillo in 6 months  If you have any questions or cardiac concerns, please call our office at [phone].

## 2025-06-02 NOTE — PROGRESS NOTES
"Chief Complaint/Reason for Visit:  No chief complaint on file. 6 month cardiovascular follow up    History Of Present Illness:    Manish Lance is a 65 y.o. male that presents to the office for 6 month follow up.    Taking medications as prescribed.     PMH is significant for CAD s/p PCI of the RCA (year unknown, PCI Cx in 2020 and then repeat laser PCI of Cx 2021, HTN, DM type 2, hyperlipidemia and BHARTI. Former smoker.     EKG personally reviewed today showed SR with T wave inversions Lead III and aVF with HR 66 bpm.  This will go to the cardiologist for final review.     He reports that he had episode of lightheadedness last weekend associated with nausea and \"feeling like he ws drunk\".  This last two days and has since resolved.  No reoccurrence of symptoms.  No chest pain/pressure, SOB, palpitations or syncope.  He has been able to do usual activities around the house.  He did not check BP during episode, but reports glucose was okay.    Past Medical History:  He has a past medical history of Diabetes mellitus (Multi), Personal history of other diseases of the circulatory system (10/16/2020), and Personal history of other diseases of the circulatory system (10/16/2020).    Past Surgical History:  He has a past surgical history that includes Coronary angioplasty (07/14/2017); Gastric bypass (07/14/2017); Back surgery (07/14/2017); and Carpal tunnel release (07/14/2017).      Social History:  He reports that he quit smoking about 30 years ago. His smoking use included cigarettes. He has quit using smokeless tobacco. He reports that he does not currently use alcohol. He reports that he does not use drugs.    Family History:  Family History[1]     Allergies:  Patient has no known allergies.    Review of Systems   Constitutional: Negative for chills and fever.   Cardiovascular:  Negative for chest pain, dyspnea on exertion, irregular heartbeat, leg swelling, near-syncope, orthopnea, palpitations and syncope. "   Respiratory:  Negative for cough, shortness of breath and wheezing.    Gastrointestinal:  Negative for hematochezia, melena, nausea and vomiting.   Genitourinary:  Negative for hematuria.   Neurological:  Positive for light-headedness (episode last weekend, but has since resolved).   Psychiatric/Behavioral:  Negative for altered mental status.        Objective      Vitals reviewed.   Constitutional:       Appearance: Not in distress.   Pulmonary:      Effort: Pulmonary effort is normal.      Breath sounds: Normal breath sounds.   Cardiovascular:      PMI at left midclavicular line. Normal rate. Regular rhythm. S1 with normal intensity. S2 with normal intensity.       Murmurs: There is no murmur.   Edema:     Peripheral edema absent.   Abdominal:      General: Bowel sounds are normal.   Skin:     General: Skin is warm and dry.   Psychiatric:         Attention and Perception: Attention normal.         Mood and Affect: Mood normal.         Behavior: Behavior is cooperative.         Current Outpatient Medications   Medication Instructions    albuterol (ProAir HFA) 90 mcg/actuation inhaler INHALE 1-2 PUFFS QID PRN SHORTNESS OR BREATH/WHEEZING    allopurinol (ZYLOPRIM) 300 mg, Daily    aspirin-acetaminophen-caffeine (Excedrin Migraine) 250-250-65 mg tablet 1 tablet, As needed    aspirin 81 mg, Daily    atorvastatin (LIPITOR) 40 mg, Nightly    buPROPion XL (WELLBUTRIN XL) 150 mg, Every morning    cholecalciferol (Vitamin D-3) 50 MCG (2000 UT) tablet 1 tablet, Daily    empagliflozin (JARDIANCE) 25 mg, Daily    ezetimibe (Zetia) 10 mg tablet 1 tablet, Daily    ferrous sulfate 325 mg, 3 times daily (morning, midday, late afternoon)    finasteride (PROSCAR) 5 mg, oral, Daily, Do not crush, chew, or split.    fluticasone (Flonase) 50 mcg/actuation nasal spray 2 sprays, Nightly    insulin glargine (LANTUS U-100 INSULIN) 29 Units, Every morning    metFORMIN (Glucophage) 1,000 mg tablet 1 tablet, Every 12 hours    metoprolol  tartrate (LOPRESSOR) 12.5 mg, oral, 2 times daily, Please do not restart this medication until instructed to do so by your cardiologist    midodrine (PROAMATINE) 2.5 mg, oral, 3 times daily (morning, midday, late afternoon)    naproxen (NAPROSYN) 375 mg, 2 times daily PRN    nitroglycerin (NITROSTAT) 0.4 mg, Every 5 min PRN    omeprazole (PriLOSEC) 20 mg DR capsule 1 capsule, 2 times daily    pregabalin (LYRICA) 300 mg, 2 times daily    ticagrelor (BRILINTA) 90 mg, 2 times daily    venlafaxine XR (EFFEXOR-XR) 150 mg, Daily        Reviewed the following Labs:  CBC -  Lab Results   Component Value Date    WBC 6.3 04/15/2025    HGB 13.4 (L) 04/15/2025    HCT 40.6 (L) 04/15/2025    MCV 88 04/15/2025     (L) 04/15/2025       RENAL FUNCTION PANEL -   Lab Results   Component Value Date    GLUCOSE 245 (H) 04/14/2025     (L) 04/14/2025    K 4.6 04/14/2025     04/14/2025    CO2 24 04/14/2025    ANIONGAP 10 04/14/2025    BUN 18 04/14/2025    CREATININE 1.30 04/14/2025    GFRMALE 52 (A) 05/07/2023    CALCIUM 8.2 (L) 04/14/2025    PHOS 3.6 04/14/2025    ALBUMIN 3.6 04/14/2025        CMP -  Lab Results   Component Value Date    CALCIUM 8.2 (L) 04/14/2025    PHOS 3.6 04/14/2025    PROT 5.6 (L) 04/14/2025    ALBUMIN 3.6 04/14/2025    AST 16 04/14/2025    ALT 29 04/14/2025    ALKPHOS 56 04/14/2025    BILITOT 0.5 04/14/2025       LIPID PANEL -   Lab Results   Component Value Date    CHOL 129 04/14/2025    TRIG 145 04/14/2025    HDL 41.1 04/14/2025    CHHDL 3.1 04/14/2025    LDLF 41 05/26/2022    VLDL 29 04/14/2025    NHDL 88 04/14/2025     Lab Results   Component Value Date    LDLCALC 59 04/14/2025       Lab Results   Component Value Date    BNP 53 04/13/2025    HGBA1C 10.7 (H) 04/14/2025       Lab Results   Component Value Date    TSH 1.56 10/28/2023       No results found for this or any previous visit.     Reviewed the following Cardiology Tests:    Echo 4/14/25:   1. The left ventricular systolic function is  normal, with a Nickerson's biplane calculated ejection fraction of 58%.   2. There is normal right ventricular global systolic function.   3. Mild aortic valve stenosis.   4. Mild aortic valve regurgitation.    Stress echo 4/14/25:    1. Adequate level of stress achieved.   2. No clinical, echocardiographic or electrocardiographic evidence for ischemia at maximal infusion.    Holter monitor 10/23/24-11/2/24  Patient had a min HR of 50 bpm, max HR of 101 bpm, and avg HR of 65 bpm. Predominant underlying rhythm was Sinus Rhythm. Isolated SVEs were rare (<1.0%), and no SVE Couplets or SVE Triplets were present. Isolated VEs were rare (<1.0%), VE Couplets were rare (<1.0%), and no VE Triplets were present.     TTE 10/5/24    1. The left ventricular systolic function is normal, with a Nickerson's biplane calculated ejection fraction of 62%.   2. There is normal right ventricular global systolic function.   3. Moderate aortic valve stenosis.   4. Mild aortic valve regurgitation.    Echo 10/28/23:    1. Left ventricular systolic function is normal with a 60% estimated ejection fraction.   2. Moderate aortic valve stenosis.   3. Mild aortic valve regurgitation.     Stress echo 7/20/22:   1. No clinical, echocardiographic or electrocardiographic evidence for ischemia at maximal workload.   2. No ECG changes from baseline.   3. The adequate level of stress was achieved.      Event recorder 7/19/2021 through 8/17/2021 showed no atrial fibrillation, SVT, pauses, heart block or VT. Baseline transmission showing sinus rhythm with a heart rate of 67 bpm. PAC burden of less than 1% and a PVC burden of 1%. 50 symptomatic events with complaints including chest pain, shortness of breath or other correlating with sinus rhythm or sinus tachycardia.     C 6/10/2021 showed 85% in-stent restenosis of the proximal circumflex. He is status post PTCA of the circumflex.     Renal artery duplex 3/8/2021 showed no evidence of hemodynamically  significant renal artery stenosis in the bilateral renal arteries.     Left heart catheterization 2/18/2020 showed 70% stenosis of the proximal circumflex. He is status post PTCA of the circumflex. RCA showed a previous patent stent.    Visit Vitals  Smoking Status Former       Assessment/Plan   There were no encounter diagnoses.    1. CAD s/p PCI RCA in the past (year unknown), PCI of Cx Feb 2020 and repeat PCI Cx June 2021   Continue DAPT - aspirin 81 mg daily and ticagrelor 90 mg BID  Continue atorvastatin 40 mg daily and Metoprolol tartrate 12.5 mg BID  TTE April 2025 with LVEF 58%   Stress echo April 2025 with no ischemia    2. Dyslipidemia  Recommend Mediterranean style of eating  Goal LDL <70.  Currently at goal.  Continue atorvastatin 40 mg daily.     3. HTN   Stable.  Antihypertensive were stopped in the past d/t orthostatic hypotension and syncope  Now on midodrine  Continue current antihypertensives: metoprolol tartrate 12.5 mg BID    4. Aortic valve stenosis   TTE Oct 2024 with moderate aortic valve stenosis   TTE April 2025 with mild aortic valve stenosis and mild aortic valve regurgitation  Monitor with annual echocardiogram     Yasmin Gore, APRN-CNP          [1]   Family History  Problem Relation Name Age of Onset    Coronary artery disease Mother      Coronary artery disease Father      Coronary artery disease Brother

## 2025-06-04 ENCOUNTER — APPOINTMENT (OUTPATIENT)
Dept: CARDIOLOGY | Facility: HOSPITAL | Age: 65
End: 2025-06-04
Payer: OTHER GOVERNMENT

## 2025-06-04 VITALS
WEIGHT: 176 LBS | HEIGHT: 70 IN | BODY MASS INDEX: 25.2 KG/M2 | HEART RATE: 66 BPM | SYSTOLIC BLOOD PRESSURE: 124 MMHG | DIASTOLIC BLOOD PRESSURE: 86 MMHG

## 2025-06-04 DIAGNOSIS — Z01.810 PREOPERATIVE CARDIOVASCULAR EXAMINATION: ICD-10-CM

## 2025-06-04 DIAGNOSIS — I10 HYPERTENSION, UNSPECIFIED TYPE: ICD-10-CM

## 2025-06-04 DIAGNOSIS — I25.10 CAD IN NATIVE ARTERY: Primary | ICD-10-CM

## 2025-06-04 DIAGNOSIS — R55 SYNCOPE AND COLLAPSE: ICD-10-CM

## 2025-06-04 DIAGNOSIS — E78.5 HYPERLIPIDEMIA, UNSPECIFIED HYPERLIPIDEMIA TYPE: ICD-10-CM

## 2025-06-04 DIAGNOSIS — I35.0 AORTIC VALVE STENOSIS, MODERATE: ICD-10-CM

## 2025-06-04 PROCEDURE — 3008F BODY MASS INDEX DOCD: CPT | Performed by: NURSE PRACTITIONER

## 2025-06-04 PROCEDURE — 93005 ELECTROCARDIOGRAM TRACING: CPT | Performed by: NURSE PRACTITIONER

## 2025-06-04 PROCEDURE — 99214 OFFICE O/P EST MOD 30 MIN: CPT | Performed by: NURSE PRACTITIONER

## 2025-06-04 PROCEDURE — 3046F HEMOGLOBIN A1C LEVEL >9.0%: CPT | Performed by: NURSE PRACTITIONER

## 2025-06-04 PROCEDURE — 1160F RVW MEDS BY RX/DR IN RCRD: CPT | Performed by: NURSE PRACTITIONER

## 2025-06-04 PROCEDURE — 3079F DIAST BP 80-89 MM HG: CPT | Performed by: NURSE PRACTITIONER

## 2025-06-04 PROCEDURE — 1159F MED LIST DOCD IN RCRD: CPT | Performed by: NURSE PRACTITIONER

## 2025-06-04 PROCEDURE — 93010 ELECTROCARDIOGRAM REPORT: CPT | Performed by: INTERNAL MEDICINE

## 2025-06-04 PROCEDURE — 1036F TOBACCO NON-USER: CPT | Performed by: NURSE PRACTITIONER

## 2025-06-04 PROCEDURE — 3048F LDL-C <100 MG/DL: CPT | Performed by: NURSE PRACTITIONER

## 2025-06-04 PROCEDURE — 3074F SYST BP LT 130 MM HG: CPT | Performed by: NURSE PRACTITIONER

## 2025-06-04 RX ORDER — MIDODRINE HYDROCHLORIDE 2.5 MG/1
2.5 TABLET ORAL
Qty: 90 TABLET | Refills: 3 | Status: SHIPPED | OUTPATIENT
Start: 2025-06-04

## 2025-06-04 ASSESSMENT — ENCOUNTER SYMPTOMS
OCCASIONAL FEELINGS OF UNSTEADINESS: 0
LIGHT-HEADEDNESS: 1
DEPRESSION: 0
LOSS OF SENSATION IN FEET: 0

## 2025-06-05 LAB
ATRIAL RATE: 66 BPM
P AXIS: 38 DEGREES
P OFFSET: 200 MS
P ONSET: 144 MS
PR INTERVAL: 160 MS
Q ONSET: 224 MS
QRS COUNT: 11 BEATS
QRS DURATION: 96 MS
QT INTERVAL: 388 MS
QTC CALCULATION(BAZETT): 406 MS
QTC FREDERICIA: 400 MS
R AXIS: 83 DEGREES
T AXIS: -12 DEGREES
T OFFSET: 418 MS
VENTRICULAR RATE: 66 BPM